# Patient Record
Sex: FEMALE | Race: WHITE | NOT HISPANIC OR LATINO
[De-identification: names, ages, dates, MRNs, and addresses within clinical notes are randomized per-mention and may not be internally consistent; named-entity substitution may affect disease eponyms.]

---

## 2017-11-17 ENCOUNTER — TRANSCRIPTION ENCOUNTER (OUTPATIENT)
Age: 37
End: 2017-11-17

## 2018-09-25 ENCOUNTER — APPOINTMENT (OUTPATIENT)
Dept: CARDIOLOGY | Facility: CLINIC | Age: 38
End: 2018-09-25

## 2018-10-11 ENCOUNTER — APPOINTMENT (OUTPATIENT)
Dept: OBGYN | Facility: CLINIC | Age: 38
End: 2018-10-11
Payer: COMMERCIAL

## 2018-10-11 VITALS
DIASTOLIC BLOOD PRESSURE: 70 MMHG | HEIGHT: 62 IN | WEIGHT: 218 LBS | SYSTOLIC BLOOD PRESSURE: 115 MMHG | BODY MASS INDEX: 40.12 KG/M2

## 2018-10-11 DIAGNOSIS — Z86.73 PERSONAL HISTORY OF TRANSIENT ISCHEMIC ATTACK (TIA), AND CEREBRAL INFARCTION W/OUT RESIDUAL DEFICITS: ICD-10-CM

## 2018-10-11 PROCEDURE — 99385 PREV VISIT NEW AGE 18-39: CPT

## 2018-10-11 RX ORDER — AZITHROMYCIN 250 MG/1
250 TABLET, FILM COATED ORAL
Qty: 6 | Refills: 0 | Status: COMPLETED | COMMUNITY
Start: 2018-06-30

## 2018-10-11 RX ORDER — CHOLECALCIFEROL (VITAMIN D3) 1250 MCG
1.25 MG CAPSULE ORAL
Qty: 12 | Refills: 0 | Status: COMPLETED | COMMUNITY
Start: 2017-09-30

## 2018-10-12 ENCOUNTER — TRANSCRIPTION ENCOUNTER (OUTPATIENT)
Age: 38
End: 2018-10-12

## 2018-10-13 LAB — HPV HIGH+LOW RISK DNA PNL CVX: NOT DETECTED

## 2018-10-17 LAB — CYTOLOGY CVX/VAG DOC THIN PREP: NORMAL

## 2018-11-12 ENCOUNTER — TRANSCRIPTION ENCOUNTER (OUTPATIENT)
Age: 38
End: 2018-11-12

## 2019-01-04 ENCOUNTER — TRANSCRIPTION ENCOUNTER (OUTPATIENT)
Age: 39
End: 2019-01-04

## 2019-01-08 ENCOUNTER — APPOINTMENT (OUTPATIENT)
Dept: NEUROLOGY | Facility: CLINIC | Age: 39
End: 2019-01-08
Payer: COMMERCIAL

## 2019-01-08 VITALS
HEIGHT: 63 IN | BODY MASS INDEX: 38.45 KG/M2 | DIASTOLIC BLOOD PRESSURE: 90 MMHG | WEIGHT: 217 LBS | SYSTOLIC BLOOD PRESSURE: 120 MMHG

## 2019-01-08 PROCEDURE — 99204 OFFICE O/P NEW MOD 45 MIN: CPT

## 2019-01-08 NOTE — ADDENDUM
[FreeTextEntry1] : This is a 38-year-old woman with what sounds like vasovagal syncope. However given her history of stroke I would like to rule out seizure as sometimes this can occur after strokes. Will check an EEG and if normal will call her with the results of severe back in the office as needed. She follows at [place] [facility] with an interventional neuroradiologist who performed her cerebral angioplasty and monitors her degree of stenosis.

## 2019-01-08 NOTE — HISTORY OF PRESENT ILLNESS
[FreeTextEntry1] : Initial office visit January 8, 2019:\par This is a 38-year-old woman who presents today for evaluation of syncope. She states about a little over week ago she woke up at about 6 AM and went to the bathroom. After getting up from the toilet and washing her hands any dizziness. Her whole body started to tingle and she fell down. She struck her head. She does not think she had any convulsive activity. She just gone to the bathroom to the wrist no urinary incontinence. He was little postictal confusion at all. She does have a complicated history with 2 strokes. Following her second stroke she has 31 angioplasty she states to correct a 90% stenosis in one of the intradural arteries. She had left-sided symptoms of whole-body face and arm greater than leg numbness leading me to believe that this is likely a right MCA lesion. She is here today for neurologic evaluation.

## 2019-01-08 NOTE — CONSULT LETTER
[Dear  ___] : Dear  [unfilled], [Consult Letter:] : I had the pleasure of evaluating your patient, [unfilled]. [Please see my note below.] : Please see my note below. [Consult Closing:] : Thank you very much for allowing me to participate in the care of this patient.  If you have any questions, please do not hesitate to contact me. [Sincerely,] : Sincerely, [FreeTextEntry3] : Marcelo Condon M.D., Ph.D. DPN-N\par [facility]\par Neurology at [place]\par Medical Director of Stroke Services\par [facility]\par

## 2019-01-08 NOTE — PHYSICAL EXAM
[General Appearance - Alert] : alert [General Appearance - In No Acute Distress] : in no acute distress [General Appearance - Well Nourished] : well nourished [Person] : oriented to person [Place] : oriented to place [Time] : oriented to time [Short Term Intact] : short term memory intact [Remote Intact] : remote memory intact [Registration Intact] : recent registration memory intact [Span Intact] : the attention span was normal [Concentration Intact] : normal concentrating ability [Visual Intact] : visual attention was ~T not ~L decreased [Naming Objects] : no difficulty naming common objects [Repeating Phrases] : no difficulty repeating a phrase [Fluency] : fluency intact [Comprehension] : comprehension intact [Current Events] : adequate knowledge of current events [Past History] : adequate knowledge of personal past history [Cranial Nerves Optic (II)] : visual acuity intact bilaterally,  visual fields full to confrontation, pupils equal round and reactive to light [Cranial Nerves Oculomotor (III)] : extraocular motion intact [Cranial Nerves Trigeminal (V)] : facial sensation intact symmetrically [Cranial Nerves Facial (VII)] : face symmetrical [Cranial Nerves Vestibulocochlear (VIII)] : hearing was intact bilaterally [Cranial Nerves Glossopharyngeal (IX)] : tongue and palate midline [Cranial Nerves Accessory (XI - Cranial And Spinal)] : head turning and shoulder shrug symmetric [Cranial Nerves Hypoglossal (XII)] : there was no tongue deviation with protrusion [Motor Strength] : muscle strength was normal in all four extremities [No Muscle Atrophy] : normal bulk in all four extremities [Motor Handedness Right-Handed] : the patient is right hand dominant [Paresis Pronator Drift Right-Sided] : no pronator drift on the right [Paresis Pronator Drift Left-Sided] : no pronator drift on the left [Sensation Tactile Decrease] : light touch was intact [Sensation Pain / Temperature Decrease] : pain and temperature was intact [Sensation Vibration Decrease] : vibration was intact [Proprioception] : proprioception was intact [Balance] : balance was intact [Tremor] : no tremor present [Coordination - Dysmetria Impaired Finger-to-Nose Bilateral] : not present [2+] : Patella left 2+ [Sclera] : the sclera and conjunctiva were normal [PERRL With Normal Accommodation] : pupils were equal in size, round, reactive to light, with normal accommodation [Extraocular Movements] : extraocular movements were intact [Optic Disc Abnormality] : the optic disc were normal in size and color [No APD] : no afferent pupillary defect [No CHARLI] : no internuclear ophthalmoplegia [Full Visual Field] : full visual field [Edema] : there was no peripheral edema [Abnormal Walk] : normal gait [Involuntary Movements] : no involuntary movements were seen [Motor Tone] : muscle strength and tone were normal

## 2019-01-08 NOTE — DATA REVIEWED
[de-identified] : She had a head CT done at [facility] emergency room after she fell which did not show any acute intracranial pathology

## 2019-01-11 ENCOUNTER — APPOINTMENT (OUTPATIENT)
Dept: NEUROLOGY | Facility: CLINIC | Age: 39
End: 2019-01-11
Payer: COMMERCIAL

## 2019-01-11 PROCEDURE — 95819 EEG AWAKE AND ASLEEP: CPT

## 2019-01-11 PROCEDURE — 93040 RHYTHM ECG WITH REPORT: CPT

## 2019-04-03 ENCOUNTER — TRANSCRIPTION ENCOUNTER (OUTPATIENT)
Age: 39
End: 2019-04-03

## 2019-05-02 ENCOUNTER — APPOINTMENT (OUTPATIENT)
Dept: OBGYN | Facility: CLINIC | Age: 39
End: 2019-05-02
Payer: COMMERCIAL

## 2019-05-02 VITALS
WEIGHT: 238 LBS | HEIGHT: 63 IN | BODY MASS INDEX: 42.17 KG/M2 | DIASTOLIC BLOOD PRESSURE: 90 MMHG | SYSTOLIC BLOOD PRESSURE: 146 MMHG

## 2019-05-02 PROCEDURE — 81025 URINE PREGNANCY TEST: CPT

## 2019-05-02 PROCEDURE — 58300 INSERT INTRAUTERINE DEVICE: CPT

## 2019-05-02 NOTE — ASSESSMENT
[FreeTextEntry1] : Nothing in the vagina for 3 days.  Motrin 600mg every 6 hours as needed for pain.  Call parameters reviewed.  RTO in 6 weeks for sono to check IUD placement.

## 2019-05-02 NOTE — PROCEDURE
[IUD Placement] : intrauterine device (IUD) placement [Prevention of Pregnancy] : prevention of pregnancy [Risks] : risks [Benefits] : benefits [Alternatives] : alternatives [Infection] : infection [Patient] : patient [Bleeding] : bleeding [Pain] : pain [Expulsion] : expulsion [Neg Pregnancy Test] : a pregnancy test was negative [Failure] : failure [No Premedication] : No premedication [Tenaculum] : a single toothed tenaculum [Easy Passage] : allowed easy passage of a uterine sound without dilation [ParaGard IUD] : The ParaGard IUD was inserted to the fundus of the uterus.  The IUD strings were cut to an appropriate length. [Tolerated Well] : the patient tolerated the procedure well [None] : no post-procedure medications given [No Complications] : there were no complications

## 2019-06-12 ENCOUNTER — APPOINTMENT (OUTPATIENT)
Dept: OBGYN | Facility: CLINIC | Age: 39
End: 2019-06-12
Payer: COMMERCIAL

## 2019-06-12 ENCOUNTER — ASOB RESULT (OUTPATIENT)
Age: 39
End: 2019-06-12

## 2019-06-12 VITALS
HEIGHT: 63 IN | SYSTOLIC BLOOD PRESSURE: 130 MMHG | WEIGHT: 238 LBS | BODY MASS INDEX: 42.17 KG/M2 | DIASTOLIC BLOOD PRESSURE: 80 MMHG

## 2019-06-12 PROCEDURE — 76857 US EXAM PELVIC LIMITED: CPT | Mod: 59

## 2019-06-12 PROCEDURE — 99213 OFFICE O/P EST LOW 20 MIN: CPT

## 2019-06-12 PROCEDURE — 76830 TRANSVAGINAL US NON-OB: CPT

## 2019-06-17 ENCOUNTER — APPOINTMENT (OUTPATIENT)
Dept: OBGYN | Facility: CLINIC | Age: 39
End: 2019-06-17

## 2019-06-18 ENCOUNTER — TRANSCRIPTION ENCOUNTER (OUTPATIENT)
Age: 39
End: 2019-06-18

## 2019-06-25 ENCOUNTER — APPOINTMENT (OUTPATIENT)
Dept: OBGYN | Facility: CLINIC | Age: 39
End: 2019-06-25
Payer: COMMERCIAL

## 2019-06-25 VITALS
HEIGHT: 63 IN | SYSTOLIC BLOOD PRESSURE: 125 MMHG | BODY MASS INDEX: 42.17 KG/M2 | WEIGHT: 238 LBS | DIASTOLIC BLOOD PRESSURE: 80 MMHG

## 2019-06-25 DIAGNOSIS — Z30.430 ENCOUNTER FOR INSERTION OF INTRAUTERINE CONTRACEPTIVE DEVICE: ICD-10-CM

## 2019-06-25 PROCEDURE — 58301 REMOVE INTRAUTERINE DEVICE: CPT

## 2019-06-25 PROCEDURE — 58300 INSERT INTRAUTERINE DEVICE: CPT

## 2019-06-25 PROCEDURE — 81025 URINE PREGNANCY TEST: CPT

## 2019-06-25 NOTE — PROCEDURE
[IUD Placement] : intrauterine device (IUD) placement [Prevention of Pregnancy] : prevention of pregnancy [Infection] : infection [Bleeding] : bleeding [Pain] : pain [Expulsion] : expulsion [Failure] : failure [Uterine Perforation] : uterine perforation [Neg Pregnancy Test] : a pregnancy test was negative [Ibuprofen ___ mg] : ibuprofen [unfilled] ~Umg [Betadine] : Prepped with Betadine [None] : none [Tenaculum] : a single toothed tenaculum [Easy Passage] : allowed easy passage of a uterine sound without dilation [ParaGard IUD] : The ParaGard IUD was inserted to the fundus of the uterus.  The IUD strings were cut to an appropriate length. [Motrin/Ibuprofen] : Motrin/Ibuprofen [IUD Removal] : IUD [Paraguard] : Micaela [Risks] : risks [Patient] : patient [Alternatives] : alternatives [Benefits] : benefits [Speculum Placed] : a speculum was placed in the vagina [Strings Visualized] : the IUD strings were visualized [IUD Removed - Forceps] : the strings were grasped with forceps and the IUD was removed [IUD Discarded] : discarded [Tolerated Well] : the patient tolerated the procedure well [No Complications] : none [de-identified] : Cytotec [de-identified] : Malposition

## 2019-06-25 NOTE — ASSESSMENT
[FreeTextEntry1] : Nothing in the vagina for 3 days.  Call parameters reviewed.  RTO in 4 weeks for sono to confirm placement.

## 2019-06-26 LAB — HCG UR QL: NEGATIVE

## 2019-07-22 ENCOUNTER — ASOB RESULT (OUTPATIENT)
Age: 39
End: 2019-07-22

## 2019-07-22 ENCOUNTER — APPOINTMENT (OUTPATIENT)
Dept: OBGYN | Facility: CLINIC | Age: 39
End: 2019-07-22
Payer: COMMERCIAL

## 2019-07-22 VITALS
HEIGHT: 63 IN | WEIGHT: 249 LBS | DIASTOLIC BLOOD PRESSURE: 84 MMHG | HEART RATE: 80 BPM | BODY MASS INDEX: 44.12 KG/M2 | SYSTOLIC BLOOD PRESSURE: 134 MMHG

## 2019-07-22 PROCEDURE — 76857 US EXAM PELVIC LIMITED: CPT | Mod: 59

## 2019-07-22 PROCEDURE — 76830 TRANSVAGINAL US NON-OB: CPT

## 2019-07-22 PROCEDURE — 99213 OFFICE O/P EST LOW 20 MIN: CPT | Mod: 25

## 2019-10-08 ENCOUNTER — APPOINTMENT (OUTPATIENT)
Dept: NEUROLOGY | Facility: CLINIC | Age: 39
End: 2019-10-08

## 2019-11-03 ENCOUNTER — TRANSCRIPTION ENCOUNTER (OUTPATIENT)
Age: 39
End: 2019-11-03

## 2020-02-21 ENCOUNTER — APPOINTMENT (OUTPATIENT)
Dept: DERMATOLOGY | Facility: CLINIC | Age: 40
End: 2020-02-21
Payer: COMMERCIAL

## 2020-02-21 PROCEDURE — 11200 RMVL SKIN TAGS UP TO&INC 15: CPT

## 2020-02-21 PROCEDURE — 99202 OFFICE O/P NEW SF 15 MIN: CPT | Mod: 25

## 2020-02-27 ENCOUNTER — APPOINTMENT (OUTPATIENT)
Dept: BARIATRICS | Facility: CLINIC | Age: 40
End: 2020-02-27
Payer: COMMERCIAL

## 2020-02-27 VITALS
WEIGHT: 253 LBS | HEART RATE: 73 BPM | HEIGHT: 63 IN | DIASTOLIC BLOOD PRESSURE: 84 MMHG | OXYGEN SATURATION: 99 % | SYSTOLIC BLOOD PRESSURE: 122 MMHG | BODY MASS INDEX: 44.83 KG/M2

## 2020-02-27 DIAGNOSIS — Z87.19 PERSONAL HISTORY OF OTHER DISEASES OF THE DIGESTIVE SYSTEM: ICD-10-CM

## 2020-02-27 PROCEDURE — 99215 OFFICE O/P EST HI 40 MIN: CPT

## 2020-02-27 RX ORDER — MISOPROSTOL 200 UG/1
200 TABLET ORAL
Qty: 3 | Refills: 0 | Status: DISCONTINUED | COMMUNITY
Start: 2019-06-17 | End: 2020-02-27

## 2020-02-27 RX ORDER — MISOPROSTOL 200 UG/1
200 TABLET ORAL
Qty: 3 | Refills: 0 | Status: DISCONTINUED | COMMUNITY
Start: 2019-06-12 | End: 2020-02-27

## 2020-03-05 ENCOUNTER — APPOINTMENT (OUTPATIENT)
Dept: BARIATRICS/WEIGHT MGMT | Facility: CLINIC | Age: 40
End: 2020-03-05
Payer: COMMERCIAL

## 2020-03-05 VITALS — BODY MASS INDEX: 45.31 KG/M2 | WEIGHT: 255.74 LBS | HEIGHT: 63 IN

## 2020-03-05 PROCEDURE — 97802 MEDICAL NUTRITION INDIV IN: CPT

## 2020-03-13 ENCOUNTER — APPOINTMENT (OUTPATIENT)
Dept: NEUROLOGY | Facility: CLINIC | Age: 40
End: 2020-03-13
Payer: COMMERCIAL

## 2020-03-13 VITALS
WEIGHT: 255 LBS | HEIGHT: 63 IN | SYSTOLIC BLOOD PRESSURE: 136 MMHG | DIASTOLIC BLOOD PRESSURE: 88 MMHG | BODY MASS INDEX: 45.18 KG/M2

## 2020-03-13 PROCEDURE — 99214 OFFICE O/P EST MOD 30 MIN: CPT

## 2020-03-13 NOTE — ASSESSMENT
[FreeTextEntry1] : This is a 39-year-old woman with recurrent migraine headache for 7 days. His complicated migraine. She is concerned that she had similar symptoms prior to her stroke several years ago. She had a recent angiogram which showed normal vessels she states. I will check an MRI of her brain. All of her steroid dose pack to see if I can break her cycle migraine. I will see her back in 2 months for call her once again the results of the MRI to discuss any further treatment as needed. She normally takes Aleve for her headaches. I told her on the Medrol Dosepak not to take Aleve but use Tylenol and then she did restart the Aleve when she finishes the steroids.

## 2020-03-13 NOTE — HISTORY OF PRESENT ILLNESS
[FreeTextEntry1] : Initial office visit January 8, 2019:\par This is a 38-year-old woman who presents today for evaluation of syncope. She states about a little over week ago she woke up at about 6 AM and went to the bathroom. After getting up from the toilet and washing her hands any dizziness. Her whole body started to tingle and she fell down. She struck her head. She does not think she had any convulsive activity. She just gone to the bathroom to the wrist no urinary incontinence. He was little postictal confusion at all. She does have a complicated history with 2 strokes. Following her second stroke she has 31 angioplasty she states to correct a 90% stenosis in one of the intradural arteries. She had left-sided symptoms of whole-body face and arm greater than leg numbness leading me to believe that this is likely a right MCA lesion. She is here today for neurologic evaluation.\par \par Followup March 13, 2020:\par This is a 39-year-old woman who presents today for neurologic followup. She is having a new problem of migraine headache. She has had stroke when she was younger status post angioplasty and follows with the neuroradiologist at [place] and had recent angiogram which he states was normal. The last week or so she's been having migraine headache with mostly bilateral posterior head pain with dry heaving as well as numbness into both arms and photophobia as well as a dizziness. The dizziness is exacerbated she looks up or down. She is concerned because she had migraines at this prior to her previous stroke. She is here today for neurologic reevaluation.

## 2020-03-13 NOTE — CONSULT LETTER
[Dear  ___] : Dear  [unfilled], [Consult Letter:] : I had the pleasure of evaluating your patient, [unfilled]. [Please see my note below.] : Please see my note below. [Consult Closing:] : Thank you very much for allowing me to participate in the care of this patient.  If you have any questions, please do not hesitate to contact me. [Sincerely,] : Sincerely, [FreeTextEntry3] : Marcelo Condon M.D., Ph.D. DPN-N\par [facility]\par Neurology at [place]\par Medical Director of Stroke Services\par [facility]\par

## 2020-03-13 NOTE — PHYSICAL EXAM

## 2020-03-13 NOTE — REVIEW OF SYSTEMS
[As Noted in HPI] : as noted in HPI [Numbness] : numbness [Vertigo] : vertigo [Migraine Headache] : migraine headaches [Negative] : Heme/Lymph

## 2020-03-14 ENCOUNTER — FORM ENCOUNTER (OUTPATIENT)
Age: 40
End: 2020-03-14

## 2020-03-15 ENCOUNTER — APPOINTMENT (OUTPATIENT)
Dept: MRI IMAGING | Facility: CLINIC | Age: 40
End: 2020-03-15
Payer: COMMERCIAL

## 2020-03-15 ENCOUNTER — OUTPATIENT (OUTPATIENT)
Dept: OUTPATIENT SERVICES | Facility: HOSPITAL | Age: 40
LOS: 1 days | End: 2020-03-15
Payer: COMMERCIAL

## 2020-03-15 DIAGNOSIS — G43.109 MIGRAINE WITH AURA, NOT INTRACTABLE, WITHOUT STATUS MIGRAINOSUS: ICD-10-CM

## 2020-03-15 PROCEDURE — 70551 MRI BRAIN STEM W/O DYE: CPT | Mod: 26

## 2020-03-15 PROCEDURE — 70551 MRI BRAIN STEM W/O DYE: CPT

## 2020-04-10 ENCOUNTER — RX CHANGE (OUTPATIENT)
Age: 40
End: 2020-04-10

## 2020-04-10 RX ORDER — TOPIRAMATE 50 MG/1
50 TABLET, FILM COATED ORAL
Qty: 60 | Refills: 5 | Status: DISCONTINUED | COMMUNITY
Start: 2020-03-18 | End: 2020-04-10

## 2020-04-15 ENCOUNTER — APPOINTMENT (OUTPATIENT)
Dept: BARIATRICS/WEIGHT MGMT | Facility: CLINIC | Age: 40
End: 2020-04-15
Payer: COMMERCIAL

## 2020-04-15 PROCEDURE — 97803 MED NUTRITION INDIV SUBSEQ: CPT | Mod: 95

## 2020-04-21 ENCOUNTER — APPOINTMENT (OUTPATIENT)
Dept: BARIATRICS/WEIGHT MGMT | Facility: CLINIC | Age: 40
End: 2020-04-21

## 2020-04-21 ENCOUNTER — APPOINTMENT (OUTPATIENT)
Dept: BARIATRICS | Facility: CLINIC | Age: 40
End: 2020-04-21
Payer: COMMERCIAL

## 2020-04-21 VITALS — WEIGHT: 243 LBS | HEIGHT: 63 IN | BODY MASS INDEX: 43.05 KG/M2

## 2020-04-21 DIAGNOSIS — E07.89 OTHER SPECIFIED DISORDERS OF THYROID: ICD-10-CM

## 2020-04-21 DIAGNOSIS — R63.5 ABNORMAL WEIGHT GAIN: ICD-10-CM

## 2020-04-21 PROCEDURE — 99214 OFFICE O/P EST MOD 30 MIN: CPT | Mod: 95

## 2020-04-21 RX ORDER — METHYLPREDNISOLONE 4 MG/1
4 TABLET ORAL
Qty: 1 | Refills: 0 | Status: DISCONTINUED | COMMUNITY
Start: 2020-03-13 | End: 2020-04-21

## 2020-04-21 NOTE — REASON FOR VISIT
[Initial Consult] : an initial consult for [S/P Bariatric Surgery] : s/p bariatric surgery [Morbid Obesity (BMI>40)] : morbid obesity (bmi>40)

## 2020-04-23 NOTE — HISTORY OF PRESENT ILLNESS
[Home] : at home, [unfilled] , at the time of the visit. [Patient] : the patient [Other Location: e.g. Home (Enter Location, City,State)___] : at [unfilled] [Procedure: ___] : Procedure performed: [unfilled]  [Self] : self [Surgeon Name:   ___] : Surgeon Name: Dr. MARIN [Date of Surgery: ___] : Date of Surgery:   [unfilled] [Pre-Op Weight ___] : Pre-op weight was [unfilled] lbs [de-identified] : 39 year old female s/p laparoscopic sleeve gastrectomy at [place] presents for follow up visit. She lost 10 lbs since last visit, which she attributes to changes in lifestyle and eating habits. Based on brief diet recall, she is consuming the appropriate amount of protein and water daily and not snacking. She is drinking less with meals and decreased soda intake by half. Patient reports that she temporarily stopped school and will return in July. Also, since restrictions from COVID 19 pandemic, she is working from home and has more time to focus on her. She is now sleeping about 7 hours a night. She is taking vitamins (womens one a day multivitamin) daily. Patient was placed on Topiramate for treatment of migraines last month. She denies acid reflux symptoms, nausea, vomiting, diarrhea and constipation. For exercise, she is walking less, but takes 30 min walks daily. Patient did not get labs drawn as requested at last visit.

## 2020-04-23 NOTE — REVIEW OF SYSTEMS
[Patient Intake Form Reviewed] : Patient intake form was reviewed [Recent Change In Weight] : ~T recent weight change [Negative] : Integumentary [Fever] : no fever [Fatigue] : no fatigue [Night Sweats] : no night sweats [Chills] : no chills [Confused] : no confusion [Fainting] : no fainting [Difficulty Walking] : no difficulty walking [Dizziness] : no dizziness [FreeTextEntry2] : weight loss

## 2020-04-23 NOTE — PHYSICAL EXAM
[Obese, well nourished, in no acute distress] : obese, well nourished, in no acute distress [Normal] : affect appropriate [de-identified] : corrective lenses, EOMI, anicteric

## 2020-04-23 NOTE — ASSESSMENT
[FreeTextEntry1] : 39 year old female s/p laparoscopic sleeve gastrectomy at [place] presents for follow up visit. She is doing well and losing weight. The patient was encouraged to continue a low fat, low carbohydrate and high protein diet.  She was advised to continue to limit drinking with meals and to stop drinking soda. She has follow up appointment with nutritionist next month. Patient was advised to increase amount of walking to reach goal of 10,000 steps daily and to incorporate strength exercises. It was also suggested that she come up with plan to manage life stressors when there are less restrictions from COVID 19 pandemic. \par \par Nutrition and exercise counseling provided.\par Continue vitamins - may switch after labs\par LABS\par Follow up in 2 months \par Call with any questions or concerns\par

## 2020-04-23 NOTE — ASSESSMENT
[FreeTextEntry1] : 39 year old female that had laparoscopic sleeve gastrectomy with hiatal hernia repair with Dr Robertson at [place] in Jan 2018 presents today with weight regain and to discuss options for revisional bariatric surgery. Upon review of patient diet history, she has improvements to make, should continue a low fat, low carbohydrate and protein focused diet, but no liquid calories and snacking.  Patient was encouraged to food journal and make nutritionist appointment. Management of life stressors in relation to eating habits were discussed with her and it was suggested that she make appointment with psychologist. For exercise, she was directed to continue walking 10 K steps daily and to add strength exercises. \par \par Will refer her to obesity medicine for evaluation prior to pursuing options for revisional bariatric surgery.\par \par Nutrition and exercise counseling provided.\par Appointments with obesity medicine physician, nutritionist and psychologist\par LABS\par Follow up in 2 months\par Call with any questions or concerns

## 2020-04-23 NOTE — REVIEW OF SYSTEMS
[Patient Intake Form Reviewed] : Patient intake form was reviewed [Recent Change In Weight] : ~T recent weight change [Negative] : Psychiatric [Fever] : no fever [Night Sweats] : no night sweats [Fatigue] : no fatigue [Chills] : no chills [Confused] : no confusion [Fainting] : no fainting [Dizziness] : no dizziness [Difficulty Walking] : no difficulty walking [FreeTextEntry2] : weight gain [de-identified] : headaches

## 2020-04-23 NOTE — PHYSICAL EXAM
[Obese, well nourished, in no acute distress] : obese, well nourished, in no acute distress [Normal] : affect appropriate [de-identified] : EOMI, anicteric  [de-identified] : obese, soft, nontender, no evidence of hernia. well healed incisions

## 2020-04-23 NOTE — HISTORY OF PRESENT ILLNESS
[Procedure: ___] : Procedure performed: [unfilled]  [Date of Surgery: ___] : Date of Surgery:   [unfilled] [Surgeon Name:   ___] : Surgeon Name: Dr. MARIN [Pre-Op Weight ___] : Pre-op weight was [unfilled] lbs [de-identified] : 39 year old female that had laparoscopic sleeve gastrectomy with hiatal hernia repair with Dr Robertson at [place] in Jan 2018 presents today with weight regain and to discuss options for revisional bariatric surgery. She reports that her preop weight was 315 lb and lowest weight was 215 lb within first year of surgery. She has not followed up with surgeon since about 9 months after surgery. Patient attributes weight regain to life stressors including mother getting sick, working and going to school full time and minimal sleep. She is having three protein rich meals a day and feels that portion sizes are the same as prior to surgery. She drinks with meals and has regular soda once a week. When she stays up late at night to do school work, she drinks tea to stay up and then snacks. Patient states she is an "emotional eater". She denies acid reflux symptoms, nausea, vomiting, diarrhea and constipation.  For exercise, she walks 10 K steps daily.

## 2020-04-26 ENCOUNTER — MESSAGE (OUTPATIENT)
Age: 40
End: 2020-04-26

## 2020-05-02 ENCOUNTER — APPOINTMENT (OUTPATIENT)
Dept: DISASTER EMERGENCY | Facility: CLINIC | Age: 40
End: 2020-05-02

## 2020-05-04 LAB
SARS-COV-2 IGG SERPL IA-ACNC: <0.1 INDEX
SARS-COV-2 IGG SERPL QL IA: NEGATIVE

## 2020-05-05 ENCOUNTER — APPOINTMENT (OUTPATIENT)
Dept: NEUROLOGY | Facility: CLINIC | Age: 40
End: 2020-05-05
Payer: COMMERCIAL

## 2020-05-05 PROCEDURE — 99213 OFFICE O/P EST LOW 20 MIN: CPT | Mod: 95

## 2020-05-05 RX ORDER — TOPIRAMATE 50 MG/1
50 TABLET, FILM COATED ORAL
Qty: 180 | Refills: 2 | Status: COMPLETED | COMMUNITY
Start: 2020-04-10 | End: 2020-05-05

## 2020-05-05 NOTE — HISTORY OF PRESENT ILLNESS
[Home] : at home, [unfilled] , at the time of the visit. [Medical Office: ([facility])___] : at the medical office located in  [Patient] : the patient [FreeTextEntry1] : Initial office visit January 8, 2019:\par This is a 38-year-old woman who presents today for evaluation of syncope. She states about a little over week ago she woke up at about 6 AM and went to the bathroom. After getting up from the toilet and washing her hands any dizziness. Her whole body started to tingle and she fell down. She struck her head. She does not think she had any convulsive activity. She just gone to the bathroom to the wrist no urinary incontinence. He was little postictal confusion at all. She does have a complicated history with 2 strokes. Following her second stroke she has 31 angioplasty she states to correct a 90% stenosis in one of the intradural arteries. She had left-sided symptoms of whole-body face and arm greater than leg numbness leading me to believe that this is likely a right MCA lesion. She is here today for neurologic evaluation.\par \par Followup March 13, 2020:\par This is a 39-year-old woman who presents today for neurologic followup. She is having a new problem of migraine headache. She has had stroke when she was younger status post angioplasty and follows with the neuroradiologist at [place] and had recent angiogram which he states was normal. The last week or so she's been having migraine headache with mostly bilateral posterior head pain with dry heaving as well as numbness into both arms and photophobia as well as a dizziness. The dizziness is exacerbated she looks up or down. She is concerned because she had migraines at this prior to her previous stroke. She is here today for neurologic reevaluation.\par \par Followup May 5, 2020:\par This is a 39-year-old woman who presents today for followup of migraine. This is done via video conference due to the corona virus outbreak. Verbal consent is obtained at the time of the visit. She states that her migraines her little but better but she still having them 3-4 times per week. She is maintained on Topamax 50 mg twice a day. For her breakthrough headache she takes Aleve which usually helps. In the past she has used Botox which is helped. She had not needed Botox again until recently. She is here today with a video for followup.

## 2020-05-05 NOTE — PHYSICAL EXAM
[FreeTextEntry1] : Neurologic examination was limited due to the video call.\par \par Mental status: Awake and alert fully oriented to person place and time. There is no aphasia.\par \par Cranial nerves:  Full extraocular movements. There is no nystagmus. Normal facial sensation and motor function. Tongue was in the midline.\par \par Motor: no pronator drift  bilaterally.\par \par Sensation: Light touch was intact \par \par Coordination: Deferred\par \par Gait: Appeared normal\par

## 2020-05-05 NOTE — CONSULT LETTER
[Dear  ___] : Dear  [unfilled], [Courtesy Letter:] : I had the pleasure of seeing your patient, [unfilled], in my office today. [Please see my note below.] : Please see my note below. [Consult Closing:] : Thank you very much for allowing me to participate in the care of this patient.  If you have any questions, please do not hesitate to contact me. [Sincerely,] : Sincerely, [FreeTextEntry3] : Marcelo Condon M.D., Ph.D. DPN-N\par [facility]\par Neurology at [place]\par Medical Director of Stroke Services\par [facility]\par

## 2020-05-05 NOTE — ASSESSMENT
[FreeTextEntry1] : This is a 39-year-old woman with migraine headache. She still getting 3-4 headaches per week. I will increase her Topamax 100 mg twice a day. If this does not help she may need to be reevaluated for Botox. If needed I will refer her to my colleagues to inject Botox. I will see her back in the office in 3 months for followup sooner should the need arise. I've asked her to call me in the interim with any problems, questions or concerns.

## 2020-05-14 ENCOUNTER — RX CHANGE (OUTPATIENT)
Age: 40
End: 2020-05-14

## 2020-05-14 RX ORDER — TOPIRAMATE 100 MG/1
100 TABLET, FILM COATED ORAL
Qty: 60 | Refills: 5 | Status: DISCONTINUED | COMMUNITY
Start: 2020-05-05 | End: 2020-05-14

## 2020-05-27 ENCOUNTER — APPOINTMENT (OUTPATIENT)
Dept: BARIATRICS/WEIGHT MGMT | Facility: CLINIC | Age: 40
End: 2020-05-27

## 2020-06-20 ENCOUNTER — TRANSCRIPTION ENCOUNTER (OUTPATIENT)
Age: 40
End: 2020-06-20

## 2020-06-22 ENCOUNTER — APPOINTMENT (OUTPATIENT)
Dept: BARIATRICS | Facility: CLINIC | Age: 40
End: 2020-06-22
Payer: COMMERCIAL

## 2020-06-22 VITALS — HEIGHT: 63 IN | BODY MASS INDEX: 43.05 KG/M2 | WEIGHT: 243 LBS

## 2020-06-22 DIAGNOSIS — Z98.84 BARIATRIC SURGERY STATUS: ICD-10-CM

## 2020-06-22 DIAGNOSIS — E66.01 MORBID (SEVERE) OBESITY DUE TO EXCESS CALORIES: ICD-10-CM

## 2020-06-22 DIAGNOSIS — Z87.898 PERSONAL HISTORY OF OTHER SPECIFIED CONDITIONS: ICD-10-CM

## 2020-06-22 DIAGNOSIS — R79.0 ABNORMAL LVL OF BLOOD MINERAL: ICD-10-CM

## 2020-06-22 LAB
25(OH)D3 SERPL-MCNC: 26.7 NG/ML
ALBUMIN SERPL ELPH-MCNC: 4.3 G/DL
ALP BLD-CCNC: 101 U/L
ALT SERPL-CCNC: 11 U/L
ANION GAP SERPL CALC-SCNC: 13 MMOL/L
AST SERPL-CCNC: 14 U/L
BASOPHILS # BLD AUTO: 0.05 K/UL
BASOPHILS NFR BLD AUTO: 0.9 %
BILIRUB SERPL-MCNC: 0.2 MG/DL
BUN SERPL-MCNC: 10 MG/DL
CALCIUM SERPL-MCNC: 9 MG/DL
CHLORIDE SERPL-SCNC: 101 MMOL/L
CHOLEST SERPL-MCNC: 225 MG/DL
CHOLEST/HDLC SERPL: 3 RATIO
CO2 SERPL-SCNC: 23 MMOL/L
CREAT SERPL-MCNC: 0.66 MG/DL
EOSINOPHIL # BLD AUTO: 0.15 K/UL
EOSINOPHIL NFR BLD AUTO: 2.8 %
ESTIMATED AVERAGE GLUCOSE: 131 MG/DL
FERRITIN SERPL-MCNC: 8 NG/ML
FOLATE SERPL-MCNC: 6.6 NG/ML
GLUCOSE SERPL-MCNC: 109 MG/DL
HBA1C MFR BLD HPLC: 6.2 %
HCT VFR BLD CALC: 37.4 %
HDLC SERPL-MCNC: 74 MG/DL
HGB BLD-MCNC: 11.2 G/DL
IMM GRANULOCYTES NFR BLD AUTO: 0.2 %
IRON SATN MFR SERPL: 10 %
IRON SERPL-MCNC: 44 UG/DL
LDLC SERPL CALC-MCNC: 126 MG/DL
LYMPHOCYTES # BLD AUTO: 1.58 K/UL
LYMPHOCYTES NFR BLD AUTO: 29.5 %
MAN DIFF?: NORMAL
MCHC RBC-ENTMCNC: 24.8 PG
MCHC RBC-ENTMCNC: 29.9 GM/DL
MCV RBC AUTO: 82.9 FL
MONOCYTES # BLD AUTO: 0.49 K/UL
MONOCYTES NFR BLD AUTO: 9.2 %
NEUTROPHILS # BLD AUTO: 3.07 K/UL
NEUTROPHILS NFR BLD AUTO: 57.4 %
PLATELET # BLD AUTO: 482 K/UL
POTASSIUM SERPL-SCNC: 4.5 MMOL/L
PROT SERPL-MCNC: 6.7 G/DL
RBC # BLD: 4.51 M/UL
RBC # FLD: 13.8 %
SODIUM SERPL-SCNC: 137 MMOL/L
TIBC SERPL-MCNC: 461 UG/DL
TRIGL SERPL-MCNC: 127 MG/DL
TSH SERPL-ACNC: 2.56 UIU/ML
UIBC SERPL-MCNC: 417 UG/DL
VIT B12 SERPL-MCNC: 666 PG/ML
VIT B2 SERPL-MCNC: 196 UG/L
WBC # FLD AUTO: 5.35 K/UL

## 2020-06-22 PROCEDURE — 99214 OFFICE O/P EST MOD 30 MIN: CPT | Mod: 95

## 2020-06-22 NOTE — REASON FOR VISIT
[Morbid Obesity (BMI>40)] : morbid obesity (bmi>40) [S/P Bariatric Surgery] : s/p bariatric surgery [Follow-Up Visit] : a follow-up visit for

## 2020-06-29 NOTE — HISTORY OF PRESENT ILLNESS
[Home] : at home, [unfilled] , at the time of the visit. [Verbal consent obtained from patient] : the patient, [unfilled] [Medical Office: ([facility])___] : at the medical office located in  [Procedure: ___] : Procedure performed: [unfilled]  [Date of Surgery: ___] : Date of Surgery:   [unfilled] [Pre-Op Weight ___] : Pre-op weight was [unfilled] lbs [Surgeon Name:   ___] : Surgeon Name: Dr. MARIN [de-identified] : 39 year old female s/p laparoscopic sleeve gastrectomy at [place] presents for follow up visit. She maintained weight since last visit. Patient states that she has increased stress and thus some days is snacking throughout the day and after dinner on fruits. She considers herself an "emotional eater".  The increased stress is making it difficult to fall asleep at night and in turn, she will get up late and not have breakfast before start of working from home.  Therefore, she is not consuming the appropriate amount of protein, but is having adequate water daily.  She is taking vitamins (womens one a day multivitamin) daily and started Vit D 5000 IU once a week and Vit B12 and B complex. Patient reports worsening migraines despite recent increase in dose of Topiramate. She denies acid reflux symptoms, nausea, vomiting, diarrhea and constipation. For exercise, she is riding bike for 2-3 miles daily. Patient recently had labs drawn.

## 2020-06-29 NOTE — REVIEW OF SYSTEMS
[Patient Intake Form Reviewed] : Patient intake form was reviewed [Negative] : Integumentary [Joint Pain] : joint pain [Depression] : depression [Anxiety] : anxiety [Fever] : no fever [Chills] : no chills [Night Sweats] : no night sweats [Confused] : no confusion [Fatigue] : no fatigue [Recent Change In Weight] : ~T no recent weight change [Fainting] : no fainting [Dizziness] : no dizziness [Difficulty Walking] : no difficulty walking [Insomnia] : no insomnia [Suicidal] : not suicidal [FreeTextEntry9] : injured right wrist

## 2020-06-29 NOTE — PHYSICAL EXAM
[Obese, well nourished, in no acute distress] : obese, well nourished, in no acute distress [Normal] : grossly intact [de-identified] : corrective lenses, EOMI, anicteric

## 2020-06-29 NOTE — ASSESSMENT
[FreeTextEntry1] : 39 year old female s/p laparoscopic sleeve gastrectomy at [place] presents for follow up visit. She is maintaining weight. Since patient reports that she is an emotional eater and she is struggling now with stress, she was advised to make appointment with psychologist. Also, it was suggested that she do meditation prior to bed to help with falling asleep. The patient was encouraged to have a low fat, low carbohydrate and high protein diet consisting of three meals a day and not snacking.  She cancelled follow up appointment with nutritionist. Patient was advised to increase amount of walking to reach goal of 10,000 steps daily and to incorporate strength exercises. Reviewed lab results with patient. Vitamin D was slightly low and patient was directed to switch current Vitamin D3 5000 IU weekly to Vitamin D3 1000 IU once daily. She was directed to see hematologist about high platelet count, low ferritin and low iron sat. When told about elevated LDL and total Cholesterol, she stated that PCP started her another medications for hyperlipidemia. She was informed of Hgb A1c level and reports that its about the same as previous ones. She is currently taking metformin. A few Vitamin levels are still pending. For migraines, she is planning to see specialist for Botox injections. \par \par Nutrition and exercise counseling provided.\par Continue multivitamin - switch Vitamin D3 5000 IU weekly to Vitamin D3 1000 IU once daily.\par Food journal and nutritionist appointment\par Psychologist appointment\par Refer to hematologist\par Follow up in 6-8 weeks\par Call with any questions or concerns\par

## 2020-06-30 LAB
VIT A SERPL-MCNC: 42.8 UG/DL
VIT B1 SERPL-MCNC: 111.8 NMOL/L
VIT B6 SERPL-MCNC: 2.4 UG/L

## 2020-07-28 ENCOUNTER — OUTPATIENT (OUTPATIENT)
Dept: OUTPATIENT SERVICES | Facility: HOSPITAL | Age: 40
LOS: 1 days | End: 2020-07-28

## 2020-07-28 ENCOUNTER — APPOINTMENT (OUTPATIENT)
Dept: MRI IMAGING | Facility: CLINIC | Age: 40
End: 2020-07-28
Payer: COMMERCIAL

## 2020-07-28 DIAGNOSIS — Z00.8 ENCOUNTER FOR OTHER GENERAL EXAMINATION: ICD-10-CM

## 2020-07-28 PROCEDURE — 73221 MRI JOINT UPR EXTREM W/O DYE: CPT | Mod: 26,RT

## 2020-08-07 ENCOUNTER — APPOINTMENT (OUTPATIENT)
Dept: NEUROLOGY | Facility: CLINIC | Age: 40
End: 2020-08-07
Payer: COMMERCIAL

## 2020-08-07 VITALS — HEIGHT: 63 IN | BODY MASS INDEX: 43.05 KG/M2 | WEIGHT: 243 LBS

## 2020-08-07 DIAGNOSIS — G43.109 MIGRAINE WITH AURA, NOT INTRACTABLE, W/OUT STATUS MIGRAINOSUS: ICD-10-CM

## 2020-08-07 PROCEDURE — 99213 OFFICE O/P EST LOW 20 MIN: CPT

## 2020-08-07 NOTE — HISTORY OF PRESENT ILLNESS
[FreeTextEntry1] : Initial office visit January 8, 2019:\par This is a 38-year-old woman who presents today for evaluation of syncope. She states about a little over week ago she woke up at about 6 AM and went to the bathroom. After getting up from the toilet and washing her hands any dizziness. Her whole body started to tingle and she fell down. She struck her head. She does not think she had any convulsive activity. She just gone to the bathroom to the wrist no urinary incontinence. He was little postictal confusion at all. She does have a complicated history with 2 strokes. Following her second stroke she has 31 angioplasty she states to correct a 90% stenosis in one of the intradural arteries. She had left-sided symptoms of whole-body face and arm greater than leg numbness leading me to believe that this is likely a right MCA lesion. She is here today for neurologic evaluation.\par \par Followup March 13, 2020:\par This is a 39-year-old woman who presents today for neurologic followup. She is having a new problem of migraine headache. She has had stroke when she was younger status post angioplasty and follows with the neuroradiologist at [place] and had recent angiogram which he states was normal. The last week or so she's been having migraine headache with mostly bilateral posterior head pain with dry heaving as well as numbness into both arms and photophobia as well as a dizziness. The dizziness is exacerbated she looks up or down. She is concerned because she had migraines at this prior to her previous stroke. She is here today for neurologic reevaluation.\par \par Followup May 5, 2020:\par This is a 39-year-old woman who presents today for followup of migraine. This is done via video conference due to the corona virus outbreak. Verbal consent is obtained at the time of the visit. She states that her migraines her little but better but she still having them 3-4 times per week. She is maintained on Topamax 50 mg twice a day. For her breakthrough headache she takes Aleve which usually helps. In the past she has used Botox which is helped. She had not needed Botox again until recently. She is here today with a video for followup.\par \par Followup on the seventh 2020:\par This is a 39-year-old woman who presents today for neurologic evaluation of migraine. At last visit I increased her Topamax 200 mg twice a day. With this increase she is experiencing about half the frequency of headaches, 2 per week. These are relieved with Aleve. She is overall satisfied with his course of treatment. She is here today for followup.

## 2020-08-07 NOTE — CONSULT LETTER
[Dear  ___] : Dear  [unfilled], [Courtesy Letter:] : I had the pleasure of seeing your patient, [unfilled], in my office today. [Please see my note below.] : Please see my note below. [Consult Closing:] : Thank you very much for allowing me to participate in the care of this patient.  If you have any questions, please do not hesitate to contact me. [Sincerely,] : Sincerely, [FreeTextEntry3] : Marcelo Condon M.D., Ph.D. DPN-N\par [facility]\par Neurology at [place]\par Medical Director of Stroke Services\par [facility]\par

## 2020-08-07 NOTE — PHYSICAL EXAM
[Person] : oriented to person [Time] : oriented to time [Place] : oriented to place [Remote Intact] : remote memory intact [Registration Intact] : recent registration memory intact [Concentration Intact] : normal concentrating ability [Span Intact] : the attention span was normal [Naming Objects] : no difficulty naming common objects [Visual Intact] : visual attention was ~T not ~L decreased [Repeating Phrases] : no difficulty repeating a phrase [Fluency] : fluency intact [Current Events] : adequate knowledge of current events [Comprehension] : comprehension intact [Past History] : adequate knowledge of personal past history [Cranial Nerves Optic (II)] : visual acuity intact bilaterally,  visual fields full to confrontation, pupils equal round and reactive to light [Cranial Nerves Oculomotor (III)] : extraocular motion intact [Cranial Nerves Vestibulocochlear (VIII)] : hearing was intact bilaterally [Cranial Nerves Trigeminal (V)] : facial sensation intact symmetrically [Cranial Nerves Facial (VII)] : face symmetrical [Cranial Nerves Accessory (XI - Cranial And Spinal)] : head turning and shoulder shrug symmetric [Cranial Nerves Glossopharyngeal (IX)] : tongue and palate midline [Motor Strength] : muscle strength was normal in all four extremities [Cranial Nerves Hypoglossal (XII)] : there was no tongue deviation with protrusion [Motor Tone] : muscle tone was normal in all four extremities [No Muscle Atrophy] : normal bulk in all four extremities [Involuntary Movements] : no involuntary movements were seen [Paresis Pronator Drift Right-Sided] : no pronator drift on the right [Motor Handedness Right-Handed] : the patient is right hand dominant [Paresis Pronator Drift Left-Sided] : no pronator drift on the left [Motor Strength Upper Extremities Bilaterally] : strength was normal in both upper extremities [Motor Strength Lower Extremities Bilaterally] : strength was normal in both lower extremities [Sensation Tactile Decrease] : light touch was intact [Sensation Pain / Temperature Decrease] : pain and temperature was intact [Sensation Vibration Decrease] : vibration was intact [Proprioception] : proprioception was intact [Abnormal Walk] : normal gait [Balance] : balance was intact [Tremor] : no tremor present [Coordination - Dysmetria Impaired Finger-to-Nose Bilateral] : not present [2+] : Patella left 2+ [Sclera] : the sclera and conjunctiva were normal [PERRL With Normal Accommodation] : pupils were equal in size, round, reactive to light, with normal accommodation [Extraocular Movements] : extraocular movements were intact [No APD] : no afferent pupillary defect [No CHARLI] : no internuclear ophthalmoplegia [Full Visual Field] : full visual field

## 2020-08-07 NOTE — ASSESSMENT
[FreeTextEntry1] : This is a 39-year-old woman is competent migraine-type headaches. She is having 2 headaches per week. At this point she satisfied with taking Topamax 100 mg twice a day for prevention and Aleve for breakthrough headaches. She'll continue this regimen I will see her back in 6 months, sooner should the need arise.

## 2020-10-03 ENCOUNTER — TRANSCRIPTION ENCOUNTER (OUTPATIENT)
Age: 40
End: 2020-10-03

## 2020-12-01 ENCOUNTER — TRANSCRIPTION ENCOUNTER (OUTPATIENT)
Age: 40
End: 2020-12-01

## 2021-02-16 ENCOUNTER — APPOINTMENT (OUTPATIENT)
Dept: NEUROLOGY | Facility: CLINIC | Age: 41
End: 2021-02-16

## 2021-05-12 ENCOUNTER — NON-APPOINTMENT (OUTPATIENT)
Age: 41
End: 2021-05-12

## 2021-05-12 ENCOUNTER — APPOINTMENT (OUTPATIENT)
Dept: FAMILY MEDICINE | Facility: CLINIC | Age: 41
End: 2021-05-12
Payer: COMMERCIAL

## 2021-05-12 VITALS — DIASTOLIC BLOOD PRESSURE: 84 MMHG | SYSTOLIC BLOOD PRESSURE: 128 MMHG

## 2021-05-12 VITALS
DIASTOLIC BLOOD PRESSURE: 80 MMHG | OXYGEN SATURATION: 98 % | BODY MASS INDEX: 45.18 KG/M2 | TEMPERATURE: 98 F | SYSTOLIC BLOOD PRESSURE: 116 MMHG | WEIGHT: 255 LBS | HEART RATE: 63 BPM | HEIGHT: 63 IN

## 2021-05-12 DIAGNOSIS — Z12.39 ENCOUNTER FOR OTHER SCREENING FOR MALIGNANT NEOPLASM OF BREAST: ICD-10-CM

## 2021-05-12 DIAGNOSIS — R92.2 INCONCLUSIVE MAMMOGRAM: ICD-10-CM

## 2021-05-12 PROBLEM — E07.89 PALPABLE THYROID: Status: ACTIVE | Noted: 2021-05-12

## 2021-05-12 PROCEDURE — 93000 ELECTROCARDIOGRAM COMPLETE: CPT

## 2021-05-12 PROCEDURE — 99072 ADDL SUPL MATRL&STAF TM PHE: CPT

## 2021-05-12 PROCEDURE — 99386 PREV VISIT NEW AGE 40-64: CPT | Mod: 25

## 2021-05-12 RX ORDER — PNV NO.95/FERROUS FUM/FOLIC AC 28MG-0.8MG
TABLET ORAL
Refills: 0 | Status: DISCONTINUED | COMMUNITY
End: 2021-05-12

## 2021-05-12 RX ORDER — TOPIRAMATE 100 MG/1
100 TABLET, FILM COATED ORAL
Qty: 180 | Refills: 2 | Status: DISCONTINUED | COMMUNITY
Start: 2020-05-14 | End: 2021-05-12

## 2021-05-12 RX ORDER — VENLAFAXINE HYDROCHLORIDE 75 MG/1
75 CAPSULE, EXTENDED RELEASE ORAL
Qty: 7 | Refills: 0 | Status: DISCONTINUED | COMMUNITY
Start: 2018-06-26 | End: 2021-05-12

## 2021-05-12 RX ORDER — VITAMIN B COMPLEX
TABLET ORAL
Refills: 0 | Status: DISCONTINUED | COMMUNITY
End: 2021-05-12

## 2021-05-12 NOTE — HISTORY OF PRESENT ILLNESS
[FreeTextEntry1] : New Patient [de-identified] : RODDY is a 40 year female here as a new patient establishing care. Previous PMD was Dr. Mckay. Currently on  mg, Atorvastatin 40 mg, Effexor 150 mg, Metformin 500 mg, rx'd by previous PMD. Reports her periods are normal. Has an IUD. She is followed by neurologist Dr. Condon for migraines. On Topamax 100 mg twice a day but does not take it everyday because of side effects. Takes Aleve for her breakthrough migraines. Has a hx of strokes, s/p angioplasty and is being managed by neurosurgeon at [place]. Reports she has a loop recorder implant that shes had since 2016. States it was placed after her second stroke and was never followed up on. Patient has sleep apnea. Does not use CPAP machine. Currently on Effexor 150 mg. States she does not feel this dose has been working. Feels her anxiety is out of control and cant seem to keep it in check. It is impacting her every day life. Explains Covid was very difficult for her causing her to go into a deep depression and amplifying her anxiety. The pandemic also caused her to lose control of her weight, which she hopes to get back in control. Admits she is an over eater. In terms of her depression today, she is doing better. States her depression presents intermittently. Patient is c/o restless leg syndrome. Patient also reports she developed cellulitis on her left arm 2 months ago. Soon after she developed cold sores on her mouth and tongue. Patient is wondering if they are correlated. Does note she received the Covid-19 vaccine before sx developed. \par

## 2021-05-12 NOTE — HEALTH RISK ASSESSMENT
[Patient reported PAP Smear was normal] : Patient reported PAP Smear was normal [Patient reported mammogram was normal] : Patient reported mammogram was normal [Good] : ~his/her~  mood as  good [None] : None [Feels Safe at Home] : Feels safe at home [Fully functional (bathing, dressing, toileting, transferring, walking, feeding)] : Fully functional (bathing, dressing, toileting, transferring, walking, feeding) [Fully functional (using the telephone, shopping, preparing meals, housekeeping, doing laundry, using] : Fully functional and needs no help or supervision to perform IADLs (using the telephone, shopping, preparing meals, housekeeping, doing laundry, using transportation, managing medications and managing finances) [No] : In the past 12 months have you used drugs other than those required for medical reasons? No [With Family] : lives with family [Employed] : employed [Single] : single [# Of Children ___] : has [unfilled] children [] : No [MammogramDate] : 01/18 [MammogramComments] : eneida [PapSmearDate] : 01/19 [ColonoscopyDate] : 03/20 [FreeTextEntry2] : [employer] employee, works in revenue

## 2021-05-12 NOTE — ADDENDUM
[FreeTextEntry1] : I, Yovana Fulton acting as a scribe for Dr. Yahaira Maguire on May 12, 2021  at 12:37 PM\par

## 2021-05-12 NOTE — PLAN
[FreeTextEntry1] : \par - Blood work today \par - Mammogram and Sonogram ordered\par - F/u with GYN and cardiology \par - Cold Sores: Likely an immuno response. Advised Lysine, Vitamin C and D  \par - Restless Leg Syndrome: Will check Iron and ferritin level.\par - Sleep Apnea: Will refer to sleep specialist \par \par Anxiety:\par - Venlafaxine increased to 225 mg \par - Will have Cody contact patient\par - Highly encouraged patient find the time for self care \par - Advised the value of daily exercise such as daily walk of at least 30 minutes; YOGA, meditation ect. Strategies reviewed to ensure adequate sleep and good Nutriton with avoidance of sugar beverages and high calorie sweetened foods. Advised therapeutic breathing \par

## 2021-05-12 NOTE — END OF VISIT
[FreeTextEntry3] : Medical record entries made by the scribe today today, were at my direction and personally dictated to them by me, Dr. Yahaira Maguire on May 12, 2021. I have reviewed the chart and agree that the record accurately reflects my personal performance of the history, physical exam, assessment, and plan.\par

## 2021-05-13 LAB
25(OH)D3 SERPL-MCNC: 17.2 NG/ML
ALBUMIN SERPL ELPH-MCNC: 4.2 G/DL
ALP BLD-CCNC: 118 U/L
ALT SERPL-CCNC: 12 U/L
ANION GAP SERPL CALC-SCNC: 15 MMOL/L
APPEARANCE: ABNORMAL
AST SERPL-CCNC: 17 U/L
BACTERIA: ABNORMAL
BASOPHILS # BLD AUTO: 0.04 K/UL
BASOPHILS NFR BLD AUTO: 0.7 %
BILIRUB SERPL-MCNC: 0.4 MG/DL
BILIRUBIN URINE: NEGATIVE
BLOOD URINE: ABNORMAL
BUN SERPL-MCNC: 7 MG/DL
CALCIUM SERPL-MCNC: 9.3 MG/DL
CHLORIDE SERPL-SCNC: 98 MMOL/L
CHOLEST SERPL-MCNC: 242 MG/DL
CO2 SERPL-SCNC: 23 MMOL/L
COLOR: YELLOW
CREAT SERPL-MCNC: 0.59 MG/DL
EOSINOPHIL # BLD AUTO: 0.18 K/UL
EOSINOPHIL NFR BLD AUTO: 3.1 %
ESTIMATED AVERAGE GLUCOSE: 128 MG/DL
FERRITIN SERPL-MCNC: 10 NG/ML
FERRITIN SERPL-MCNC: 8 NG/ML
FOLATE SERPL-MCNC: 8 NG/ML
GLUCOSE QUALITATIVE U: NEGATIVE
GLUCOSE SERPL-MCNC: 90 MG/DL
HBA1C MFR BLD HPLC: 6.1 %
HCT VFR BLD CALC: 38.1 %
HDLC SERPL-MCNC: 71 MG/DL
HGB BLD-MCNC: 10.9 G/DL
HYALINE CASTS: 0 /LPF
IMM GRANULOCYTES NFR BLD AUTO: 0.2 %
IRON SATN MFR SERPL: 6 %
IRON SERPL-MCNC: 29 UG/DL
KETONES URINE: NEGATIVE
LDLC SERPL CALC-MCNC: 149 MG/DL
LEUKOCYTE ESTERASE URINE: ABNORMAL
LYMPHOCYTES # BLD AUTO: 1.49 K/UL
LYMPHOCYTES NFR BLD AUTO: 25.5 %
MAN DIFF?: NORMAL
MCHC RBC-ENTMCNC: 22.4 PG
MCHC RBC-ENTMCNC: 28.6 GM/DL
MCV RBC AUTO: 78.4 FL
MICROSCOPIC-UA: NORMAL
MONOCYTES # BLD AUTO: 0.49 K/UL
MONOCYTES NFR BLD AUTO: 8.4 %
NEUTROPHILS # BLD AUTO: 3.64 K/UL
NEUTROPHILS NFR BLD AUTO: 62.1 %
NITRITE URINE: NEGATIVE
NONHDLC SERPL-MCNC: 171 MG/DL
PH URINE: 7
PLATELET # BLD AUTO: 530 K/UL
POTASSIUM SERPL-SCNC: 4.2 MMOL/L
PROT SERPL-MCNC: 7.3 G/DL
PROTEIN URINE: ABNORMAL
RBC # BLD: 4.86 M/UL
RBC # FLD: 15.3 %
RED BLOOD CELLS URINE: 7 /HPF
SODIUM SERPL-SCNC: 135 MMOL/L
SPECIFIC GRAVITY URINE: 1.02
SQUAMOUS EPITHELIAL CELLS: 13 /HPF
TIBC SERPL-MCNC: 492 UG/DL
TRIGL SERPL-MCNC: 110 MG/DL
TSH SERPL-ACNC: 1.83 UIU/ML
UIBC SERPL-MCNC: 463 UG/DL
URATE SERPL-MCNC: 5 MG/DL
UROBILINOGEN URINE: NORMAL
VIT B12 SERPL-MCNC: 305 PG/ML
WBC # FLD AUTO: 5.85 K/UL
WHITE BLOOD CELLS URINE: 40 /HPF

## 2021-05-18 ENCOUNTER — NON-APPOINTMENT (OUTPATIENT)
Age: 41
End: 2021-05-18

## 2021-05-27 ENCOUNTER — TRANSCRIPTION ENCOUNTER (OUTPATIENT)
Age: 41
End: 2021-05-27

## 2021-06-29 ENCOUNTER — NON-APPOINTMENT (OUTPATIENT)
Age: 41
End: 2021-06-29

## 2021-06-30 ENCOUNTER — TRANSCRIPTION ENCOUNTER (OUTPATIENT)
Age: 41
End: 2021-06-30

## 2021-07-10 ENCOUNTER — TRANSCRIPTION ENCOUNTER (OUTPATIENT)
Age: 41
End: 2021-07-10

## 2021-07-14 ENCOUNTER — APPOINTMENT (OUTPATIENT)
Dept: OBGYN | Facility: CLINIC | Age: 41
End: 2021-07-14
Payer: COMMERCIAL

## 2021-07-14 VITALS
HEIGHT: 63 IN | WEIGHT: 255 LBS | SYSTOLIC BLOOD PRESSURE: 120 MMHG | BODY MASS INDEX: 45.18 KG/M2 | DIASTOLIC BLOOD PRESSURE: 80 MMHG

## 2021-07-14 LAB — HCG UR QL: NEGATIVE

## 2021-07-14 PROCEDURE — 99396 PREV VISIT EST AGE 40-64: CPT

## 2021-07-14 PROCEDURE — 81025 URINE PREGNANCY TEST: CPT

## 2021-07-20 LAB
CYTOLOGY CVX/VAG DOC THIN PREP: NORMAL
HPV HIGH+LOW RISK DNA PNL CVX: NOT DETECTED

## 2021-08-09 ENCOUNTER — TRANSCRIPTION ENCOUNTER (OUTPATIENT)
Age: 41
End: 2021-08-09

## 2021-08-17 ENCOUNTER — APPOINTMENT (OUTPATIENT)
Dept: OBGYN | Facility: CLINIC | Age: 41
End: 2021-08-17
Payer: COMMERCIAL

## 2021-08-17 ENCOUNTER — ASOB RESULT (OUTPATIENT)
Age: 41
End: 2021-08-17

## 2021-08-17 VITALS — BODY MASS INDEX: 45.18 KG/M2 | HEIGHT: 63 IN | WEIGHT: 255 LBS

## 2021-08-17 DIAGNOSIS — Z30.431 ENCOUNTER FOR ROUTINE CHECKING OF INTRAUTERINE CONTRACEPTIVE DEVICE: ICD-10-CM

## 2021-08-17 PROCEDURE — 99213 OFFICE O/P EST LOW 20 MIN: CPT

## 2021-08-17 PROCEDURE — 76830 TRANSVAGINAL US NON-OB: CPT

## 2021-08-17 PROCEDURE — 76856 US EXAM PELVIC COMPLETE: CPT | Mod: 59

## 2021-09-02 PROBLEM — Z30.431 IUD CHECK UP: Status: ACTIVE | Noted: 2019-06-12

## 2021-09-02 NOTE — PLAN
[FreeTextEntry1] : 40 year old female wwe\par \par 1. Pap done\par 2. Rx screening mammo\par 3. Family history of breast and colon cancer - counseled on genetic testing.  +/- of MyRisk testing discussed with the patient.  She will think about her options.  She is due for her mammo.  She had a screening colonoscopy in 2020. \par 4. RTO for sono to check IUD position per patient request\par 5. Annual exam in 1 year

## 2021-09-02 NOTE — HISTORY OF PRESENT ILLNESS
[FreeTextEntry1] : 40 year old female presents for sono to confirm IUD placement.  She has no complaints today.  Menses are every 28 days, lasting about 7 days.  She denies pelvic pain.

## 2021-09-02 NOTE — PLAN
[FreeTextEntry1] : 40 year old female with Paragard IUD.  Sono reviewed with the patient.  IUD in the correct position.  No abnormalities noted.  The patient was given the opportunity to ask questions and all were answered to her satisfaction.  Will RTO in 1 year for wwe.

## 2021-09-02 NOTE — HISTORY OF PRESENT ILLNESS
[FreeTextEntry1] : 40 year old female presents for wwe.  She has no complaints today.  Menses are every 28 days, lasting about 7 days.  She has a Paragard IUD that was placed in 2019.  She had a sono to confirm placement but is requesting another sono to assure it is in the correct position.  She denies pelvic pain.  She has a history of an abnormal pap.  She has an extensive medical history that is significant for a stroke, DM, HTN, elevated cholesterol and anemia.  PSH includes and angioplasty of the brain, CS, tonsillectomy, cholecystectomy, ankle surgery and gastric sleeve.  Family history is significant for a maternal grandmother with breast cancer under the age of 50.  Her mother was also diagnosed with colon cancer at age 50.  She is a  (etop x2).  She is allergic to ceclor.  She takes ramipril, atorvastatin, and metformin.

## 2021-10-03 ENCOUNTER — TRANSCRIPTION ENCOUNTER (OUTPATIENT)
Age: 41
End: 2021-10-03

## 2021-10-18 ENCOUNTER — OUTPATIENT (OUTPATIENT)
Dept: OUTPATIENT SERVICES | Facility: HOSPITAL | Age: 41
LOS: 1 days | End: 2021-10-18
Payer: COMMERCIAL

## 2021-10-18 ENCOUNTER — RESULT REVIEW (OUTPATIENT)
Age: 41
End: 2021-10-18

## 2021-10-18 ENCOUNTER — APPOINTMENT (OUTPATIENT)
Dept: ULTRASOUND IMAGING | Facility: CLINIC | Age: 41
End: 2021-10-18
Payer: COMMERCIAL

## 2021-10-18 ENCOUNTER — APPOINTMENT (OUTPATIENT)
Dept: MAMMOGRAPHY | Facility: CLINIC | Age: 41
End: 2021-10-18
Payer: COMMERCIAL

## 2021-10-18 DIAGNOSIS — Z00.00 ENCOUNTER FOR GENERAL ADULT MEDICAL EXAMINATION WITHOUT ABNORMAL FINDINGS: ICD-10-CM

## 2021-10-18 PROCEDURE — 77063 BREAST TOMOSYNTHESIS BI: CPT | Mod: 26

## 2021-10-18 PROCEDURE — 77063 BREAST TOMOSYNTHESIS BI: CPT

## 2021-10-18 PROCEDURE — 76641 ULTRASOUND BREAST COMPLETE: CPT

## 2021-10-18 PROCEDURE — 76641 ULTRASOUND BREAST COMPLETE: CPT | Mod: 26,50

## 2021-10-18 PROCEDURE — 77067 SCR MAMMO BI INCL CAD: CPT

## 2021-10-18 PROCEDURE — 77067 SCR MAMMO BI INCL CAD: CPT | Mod: 26

## 2021-12-07 ENCOUNTER — TRANSCRIPTION ENCOUNTER (OUTPATIENT)
Age: 41
End: 2021-12-07

## 2021-12-17 ENCOUNTER — TRANSCRIPTION ENCOUNTER (OUTPATIENT)
Age: 41
End: 2021-12-17

## 2021-12-18 RX ORDER — MULTIVITAMIN
TABLET ORAL
Refills: 0 | Status: ACTIVE | COMMUNITY

## 2021-12-21 ENCOUNTER — APPOINTMENT (OUTPATIENT)
Dept: CARDIOLOGY | Facility: CLINIC | Age: 41
End: 2021-12-21

## 2021-12-26 ENCOUNTER — RX RENEWAL (OUTPATIENT)
Age: 41
End: 2021-12-26

## 2022-01-08 ENCOUNTER — TRANSCRIPTION ENCOUNTER (OUTPATIENT)
Age: 42
End: 2022-01-08

## 2022-01-13 ENCOUNTER — TRANSCRIPTION ENCOUNTER (OUTPATIENT)
Age: 42
End: 2022-01-13

## 2022-02-03 ENCOUNTER — APPOINTMENT (OUTPATIENT)
Dept: FAMILY MEDICINE | Facility: CLINIC | Age: 42
End: 2022-02-03
Payer: COMMERCIAL

## 2022-02-03 VITALS
OXYGEN SATURATION: 98 % | SYSTOLIC BLOOD PRESSURE: 170 MMHG | BODY MASS INDEX: 43.77 KG/M2 | WEIGHT: 247 LBS | HEIGHT: 63 IN | TEMPERATURE: 97.5 F | HEART RATE: 98 BPM | DIASTOLIC BLOOD PRESSURE: 110 MMHG

## 2022-02-03 VITALS — DIASTOLIC BLOOD PRESSURE: 94 MMHG | SYSTOLIC BLOOD PRESSURE: 132 MMHG

## 2022-02-03 DIAGNOSIS — E61.1 IRON DEFICIENCY: ICD-10-CM

## 2022-02-03 DIAGNOSIS — G25.81 RESTLESS LEGS SYNDROME: ICD-10-CM

## 2022-02-03 DIAGNOSIS — G47.00 INSOMNIA, UNSPECIFIED: ICD-10-CM

## 2022-02-03 PROCEDURE — 99214 OFFICE O/P EST MOD 30 MIN: CPT

## 2022-02-03 NOTE — PLAN
[FreeTextEntry1] : - Labs drawn in office today\par - START: Zolpidem Tartrate 5 MG Oral Tablet; Take 1 Tablet by Mouth at Bedtime as needed. Maxium daily dose is: 1 Tablet MDD: 1 tab\par - Referral provided for: [facility]\par - Referral Provided for: Sleep disorder specialist\par - Adv. seeing hematology pending bw (iron/ferritin levels)\par

## 2022-02-03 NOTE — ADDENDUM
[FreeTextEntry1] : I, Nicky Newberry acting as a scribe for Dr. Yahaira Maguire on February 3, 2022.\par

## 2022-02-03 NOTE — HISTORY OF PRESENT ILLNESS
[FreeTextEntry8] : 40yo F presents with insomnia. Pt has hx of restless leg and states she has been taking iron supplement as directed. Pt states restless leg does not happen everyday, reports it happens 60% of the time and is a contributing factor to her insomnia. Pt states she feels like she can't " calm down her thoughts" cannot sleep. \par Pt reports that her mood is generally good however, when she lays down at night her depression is significantly worse. \par pt reports she is waking up late, getting to work late, daughter missing her bus - reports the insomnia is now impacting her life too much. Pt stated she has been compliant with taking Effexor  MG Oral Capsule Extended release 24 Hour and Melatonin 10 MG. Patient stated she had seen a  for 4 sessions with the goal of finding a psychiatrist but the sessions had abruptly stopped. Patient expressed desire to restart Ambien for sleep relief. Patient stated she will f/u with Dr. Baig regarding HTN post- blood work. Patient stated she was prescribed Metformin HCI by previous PCP and has not taken it in a long time. Patient stated she is willing to see hematology if iron/ferritin levels don’t improve. Patient stated that she is unable to use CPAP machine for sleep assistance and is wiling to see a sleep specialist. Patient stated that she was dx with severe sleep apnea around 2014. Patient stated that she monitors bp at home and averages 130/92.

## 2022-02-03 NOTE — END OF VISIT
[FreeTextEntry3] : Medical record entries made by the scribe today today, were at my direction and personally dictated to them by me, Dr. Yahaira Maguire on Feb 3, 2022. I have reviewed the chart and agree that the record accurately reflects my personal performance of the history, physical exam, assessment, and plan.\par

## 2022-02-04 LAB
25(OH)D3 SERPL-MCNC: 16.5 NG/ML
ALBUMIN SERPL ELPH-MCNC: 4.2 G/DL
ALP BLD-CCNC: 124 U/L
ALT SERPL-CCNC: 8 U/L
ANION GAP SERPL CALC-SCNC: 14 MMOL/L
AST SERPL-CCNC: 14 U/L
BASOPHILS # BLD AUTO: 0.04 K/UL
BASOPHILS NFR BLD AUTO: 0.7 %
BILIRUB SERPL-MCNC: 0.2 MG/DL
BUN SERPL-MCNC: 9 MG/DL
CALCIUM SERPL-MCNC: 9.1 MG/DL
CHLORIDE SERPL-SCNC: 98 MMOL/L
CHOLEST SERPL-MCNC: 259 MG/DL
CO2 SERPL-SCNC: 24 MMOL/L
CREAT SERPL-MCNC: 0.61 MG/DL
EOSINOPHIL # BLD AUTO: 0.17 K/UL
EOSINOPHIL NFR BLD AUTO: 2.9 %
ESTIMATED AVERAGE GLUCOSE: 134 MG/DL
FERRITIN SERPL-MCNC: 12 NG/ML
GLUCOSE SERPL-MCNC: 163 MG/DL
HBA1C MFR BLD HPLC: 6.3 %
HCT VFR BLD CALC: 40.1 %
HDLC SERPL-MCNC: 69 MG/DL
HGB BLD-MCNC: 12.4 G/DL
IMM GRANULOCYTES NFR BLD AUTO: 0.2 %
IRON SATN MFR SERPL: 9 %
IRON SERPL-MCNC: 39 UG/DL
LDLC SERPL CALC-MCNC: 158 MG/DL
LYMPHOCYTES # BLD AUTO: 1.45 K/UL
LYMPHOCYTES NFR BLD AUTO: 24.7 %
MAN DIFF?: NORMAL
MCHC RBC-ENTMCNC: 26.4 PG
MCHC RBC-ENTMCNC: 30.9 GM/DL
MCV RBC AUTO: 85.5 FL
MONOCYTES # BLD AUTO: 0.43 K/UL
MONOCYTES NFR BLD AUTO: 7.3 %
NEUTROPHILS # BLD AUTO: 3.76 K/UL
NEUTROPHILS NFR BLD AUTO: 64.2 %
NONHDLC SERPL-MCNC: 191 MG/DL
PLATELET # BLD AUTO: 463 K/UL
POTASSIUM SERPL-SCNC: 4 MMOL/L
PROT SERPL-MCNC: 7.1 G/DL
RBC # BLD: 4.69 M/UL
RBC # FLD: 12.9 %
SODIUM SERPL-SCNC: 136 MMOL/L
TIBC SERPL-MCNC: 452 UG/DL
TRIGL SERPL-MCNC: 164 MG/DL
TSH SERPL-ACNC: 2.18 UIU/ML
UIBC SERPL-MCNC: 413 UG/DL
URATE SERPL-MCNC: 5 MG/DL
WBC # FLD AUTO: 5.86 K/UL

## 2022-02-10 ENCOUNTER — NON-APPOINTMENT (OUTPATIENT)
Age: 42
End: 2022-02-10

## 2022-05-27 ENCOUNTER — NON-APPOINTMENT (OUTPATIENT)
Age: 42
End: 2022-05-27

## 2022-08-02 ENCOUNTER — RX RENEWAL (OUTPATIENT)
Age: 42
End: 2022-08-02

## 2022-08-05 ENCOUNTER — TRANSCRIPTION ENCOUNTER (OUTPATIENT)
Age: 42
End: 2022-08-05

## 2022-08-08 ENCOUNTER — APPOINTMENT (OUTPATIENT)
Dept: FAMILY MEDICINE | Facility: CLINIC | Age: 42
End: 2022-08-08

## 2022-08-11 ENCOUNTER — NON-APPOINTMENT (OUTPATIENT)
Age: 42
End: 2022-08-11

## 2022-09-14 ENCOUNTER — NON-APPOINTMENT (OUTPATIENT)
Age: 42
End: 2022-09-14

## 2022-09-14 ENCOUNTER — APPOINTMENT (OUTPATIENT)
Dept: OBGYN | Facility: CLINIC | Age: 42
End: 2022-09-14

## 2022-09-14 VITALS
BODY MASS INDEX: 43.41 KG/M2 | DIASTOLIC BLOOD PRESSURE: 104 MMHG | SYSTOLIC BLOOD PRESSURE: 172 MMHG | HEIGHT: 63 IN | WEIGHT: 245 LBS

## 2022-09-14 PROCEDURE — 99396 PREV VISIT EST AGE 40-64: CPT

## 2022-09-14 NOTE — PLAN
[FreeTextEntry1] : 41 year old female wwe\par \par 1. Pap done\par 2. Rx screening mammo\par 3. Family history of breast and colon cancer - counseled on genetic testing.  +/- of MyRisk testing discussed with the patient.  She will think about her options.  She is due for her mammo.  She had a screening colonoscopy in 2020. \par 4.  ParaGard IUD good until 2029\par 5. Annual exam in 1 year

## 2022-09-14 NOTE — PHYSICAL EXAM
[Chaperone Declined] : Patient declined chaperone [Appropriately responsive] : appropriately responsive [Alert] : alert [No Acute Distress] : no acute distress [No Lymphadenopathy] : no lymphadenopathy [Regular Rate Rhythm] : regular rate rhythm [No Murmurs] : no murmurs [Clear to Auscultation B/L] : clear to auscultation bilaterally [Soft] : soft [Non-tender] : non-tender [Non-distended] : non-distended [No HSM] : No HSM [No Lesions] : no lesions [No Mass] : no mass [Oriented x3] : oriented x3 [Examination Of The Breasts] : a normal appearance [No Masses] : no breast masses were palpable [Labia Majora] : normal [Labia Minora] : normal [IUD String] : an IUD string was noted [Normal] : normal [Uterine Adnexae] : normal

## 2022-09-14 NOTE — HISTORY OF PRESENT ILLNESS
[FreeTextEntry1] : 41 year old female presents for wwe.  She has no complaints today.  She states over the past 2 or 3 months she has had recurrent UTI and yeast infections.  She is asymptomatic today.  Menses are every 28 days, lasting about 7 days.  She has a Paragard IUD that was placed in 2019.  She is happy with the IUD.  She has a history of an abnormal pap.  She has an extensive medical history that is significant for a stroke, DM, HTN, elevated cholesterol and anemia.  PSH includes and angioplasty of the brain, CS, tonsillectomy, cholecystectomy, ankle surgery and gastric sleeve.  Family history is significant for a maternal grandmother with breast cancer under the age of 50.  Her mother was also diagnosed with colon cancer at age 50.  She is a  (etop x2).  She is allergic to ceclor.  She takes ramipril, atorvastatin, and metformin.

## 2022-09-20 ENCOUNTER — TRANSCRIPTION ENCOUNTER (OUTPATIENT)
Age: 42
End: 2022-09-20

## 2022-09-20 LAB
CYTOLOGY CVX/VAG DOC THIN PREP: ABNORMAL
HPV HIGH+LOW RISK DNA PNL CVX: NOT DETECTED

## 2022-09-21 ENCOUNTER — APPOINTMENT (OUTPATIENT)
Dept: ORTHOPEDIC SURGERY | Facility: CLINIC | Age: 42
End: 2022-09-21

## 2022-09-21 VITALS — HEIGHT: 63 IN | BODY MASS INDEX: 43.41 KG/M2 | WEIGHT: 245 LBS

## 2022-09-21 DIAGNOSIS — S83.8X2D SPRAIN OF OTHER SPECIFIED PARTS OF LEFT KNEE, SUBSEQUENT ENCOUNTER: ICD-10-CM

## 2022-09-21 PROCEDURE — 73562 X-RAY EXAM OF KNEE 3: CPT | Mod: LT

## 2022-09-21 PROCEDURE — 99214 OFFICE O/P EST MOD 30 MIN: CPT

## 2022-09-21 NOTE — PHYSICAL EXAM
[NL (0)] : extension 0 degrees [4___] : quadriceps 4[unfilled]/5 [5___] : hamstring 5[unfilled]/5 [] : light touch is intact throughout [Left] : left knee [There are no fractures, subluxations or dislocations. No significant abnormalities are seen] : There are no fractures, subluxations or dislocations. No significant abnormalities are seen [TWNoteComboBox7] : flexion 120 degrees

## 2022-09-21 NOTE — HISTORY OF PRESENT ILLNESS
[6] : 6 [8] : 8 [Burning] : burning [Dull/Aching] : dull/aching [Shooting] : shooting [Stabbing] : stabbing [Full time] : Work status: full time [de-identified] : 9/21/22:  left knee pain for several weeks without injury.   [] : no [FreeTextEntry1] : left knee  [FreeTextEntry9] : motrin

## 2022-09-21 NOTE — ASSESSMENT
[FreeTextEntry1] : left knee pain for several weeks.  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  not getting better with rest, nsaids, hep. some mild to moderate pf oa.\par \par history of 2 cva.  uncertain origin. history of left knee sided weakness but now better.  diabetes and htn.

## 2022-09-22 ENCOUNTER — APPOINTMENT (OUTPATIENT)
Dept: MRI IMAGING | Facility: CLINIC | Age: 42
End: 2022-09-22

## 2022-09-23 ENCOUNTER — FORM ENCOUNTER (OUTPATIENT)
Age: 42
End: 2022-09-23

## 2022-09-23 ENCOUNTER — APPOINTMENT (OUTPATIENT)
Dept: DERMATOLOGY | Facility: CLINIC | Age: 42
End: 2022-09-23

## 2022-09-23 PROCEDURE — 99213 OFFICE O/P EST LOW 20 MIN: CPT

## 2022-09-24 ENCOUNTER — APPOINTMENT (OUTPATIENT)
Dept: MRI IMAGING | Facility: CLINIC | Age: 42
End: 2022-09-24

## 2022-09-24 PROCEDURE — 73721 MRI JNT OF LWR EXTRE W/O DYE: CPT | Mod: LT

## 2022-09-28 ENCOUNTER — APPOINTMENT (OUTPATIENT)
Dept: ORTHOPEDIC SURGERY | Facility: CLINIC | Age: 42
End: 2022-09-28

## 2022-09-28 VITALS — BODY MASS INDEX: 43.41 KG/M2 | HEIGHT: 63 IN | WEIGHT: 245 LBS

## 2022-09-28 PROCEDURE — J3490M: CUSTOM

## 2022-09-28 PROCEDURE — 20611 DRAIN/INJ JOINT/BURSA W/US: CPT | Mod: LT

## 2022-09-28 PROCEDURE — 99214 OFFICE O/P EST MOD 30 MIN: CPT | Mod: 25

## 2022-09-28 NOTE — ASSESSMENT
[FreeTextEntry1] : left knee pain for several weeks.  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  not getting better with rest, nsaids, hep. some mild to moderate pf oa. mri 2022 shows mmt, oa.\par \par history of 2 cva.  uncertain origin. history of left knee sided weakness but now better.  diabetes and htn.

## 2022-09-28 NOTE — HISTORY OF PRESENT ILLNESS
[Full time] : Work status: full time [de-identified] : 9/21/22:  left knee pain for several weeks without injury.  \par 9/28/22: medial left knee pain persists. [6] : 6 [8] : 8 [Burning] : burning [Dull/Aching] : dull/aching [Shooting] : shooting [Stabbing] : stabbing [] : no [FreeTextEntry1] : left knee  [FreeTextEntry9] : motrin  [de-identified] : MRI done at [facility]

## 2022-09-28 NOTE — PROCEDURE
[Large Joint Injection] : Large joint injection [Left] : of the left [Knee] : knee [Pain] : pain [Alcohol] : alcohol [Betadine] : betadine [___ cc    3mg] :  Betamethasone (Celestone) ~Vcc of 3mg [___ cc    1%] : Lidocaine ~Vcc of 1%  [___ cc    0.25%] : Bupivacaine (Marcaine) ~Vcc of 0.25%  [] : Patient tolerated procedure well [Call if redness, pain or fever occur] : call if redness, pain or fever occur [Apply ice for 15min out of every hour for the next 12-24 hours as tolerated] : apply ice for 15 minutes out of every hour for the next 12-24 hours as tolerated [Patient was advised to rest the joint(s) for ____ days] : patient was advised to rest the joint(s) for [unfilled] days [Previous OTC use and PT nontherapeutic] : patient has tried OTC's including aspirin, Ibuprofen, Aleve, etc or prescription NSAIDS, and/or exercises at home and/or physical therapy without satisfactory response [Patient had decreased mobility in the joint] : patient had decreased mobility in the joint [Risks, benefits, alternatives discussed / Verbal consent obtained] : the risks benefits, and alternatives have been discussed, and verbal consent was obtained [All ultrasound images have been permanently captured and stored accordingly in our picture archiving and communication system] : All ultrasound images have been permanently captured and stored accordingly in our picture archiving and communication system [Visualization of the needle and placement of injection was performed without complication] : visualization of the needle and placement of injection was performed without complication [Inflammation] : inflammation [de-identified] : for accurate placement of needle into knee joint

## 2022-09-30 ENCOUNTER — APPOINTMENT (OUTPATIENT)
Dept: FAMILY MEDICINE | Facility: CLINIC | Age: 42
End: 2022-09-30

## 2022-09-30 VITALS
WEIGHT: 245 LBS | TEMPERATURE: 98.5 F | DIASTOLIC BLOOD PRESSURE: 100 MMHG | SYSTOLIC BLOOD PRESSURE: 158 MMHG | BODY MASS INDEX: 43.41 KG/M2 | HEART RATE: 85 BPM | OXYGEN SATURATION: 97 % | HEIGHT: 63 IN

## 2022-09-30 DIAGNOSIS — R32 UNSPECIFIED URINARY INCONTINENCE: ICD-10-CM

## 2022-09-30 PROCEDURE — 99214 OFFICE O/P EST MOD 30 MIN: CPT | Mod: 25

## 2022-09-30 PROCEDURE — 93000 ELECTROCARDIOGRAM COMPLETE: CPT

## 2022-09-30 RX ORDER — VENLAFAXINE HYDROCHLORIDE 150 MG/1
150 CAPSULE, EXTENDED RELEASE ORAL DAILY
Refills: 0 | Status: DISCONTINUED | COMMUNITY
End: 2022-09-30

## 2022-09-30 RX ORDER — BEMPEDOIC ACID 180 MG/1
180 TABLET, FILM COATED ORAL
Refills: 0 | Status: DISCONTINUED | COMMUNITY
End: 2022-09-30

## 2022-09-30 RX ORDER — ZOLPIDEM TARTRATE 5 MG/1
5 TABLET ORAL
Qty: 30 | Refills: 0 | Status: DISCONTINUED | COMMUNITY
Start: 2022-02-03 | End: 2022-09-30

## 2022-09-30 NOTE — HISTORY OF PRESENT ILLNESS
[FreeTextEntry8] : 40yo F with PMHx of HTN, HLD, CVA (2014 &2016) presents with elevated BP for the past two weeks. Pt was seen in OBGYN office 2 weeks ago with elevated pressure. Pt reports at home she has had BP of 186/126, 178/11, and 189/116. Pt reports no headaches, weakness, numbness, tingling. Pt reports she gets flushed whenever her BP is high. Pt currently on Rampiril 10mg BID and amlodiping 10mg qd. Pt reports she takes medication daily as directed. Pt has not been to cardiologist since December 2021. According to note, states she should start HCTZ 12.5mg but patient reports she has never taken that medication before. Pt denies any CP, palpitations, or recent illness/injury.

## 2022-09-30 NOTE — PLAN
[FreeTextEntry1] : Hypertension: \par - Continue Amlodipine 10mg\par -Continue Rampiril 10mg BID \par - Start HCTZ 25mg \par - Continue to monitor BP at home\par - CMP checked\par \par HLD: \par - Continue atorvastatin 40mg\par - BW drawn \par \par Pre-Diabetic: \par - Continue metformin 500mg BID \par \par Anxiety:\par - Continue Venlafaxine 225mg

## 2022-10-01 ENCOUNTER — TRANSCRIPTION ENCOUNTER (OUTPATIENT)
Age: 42
End: 2022-10-01

## 2022-10-05 ENCOUNTER — APPOINTMENT (OUTPATIENT)
Dept: OBGYN | Facility: CLINIC | Age: 42
End: 2022-10-05

## 2022-10-05 ENCOUNTER — NON-APPOINTMENT (OUTPATIENT)
Age: 42
End: 2022-10-05

## 2022-10-05 VITALS
WEIGHT: 245 LBS | DIASTOLIC BLOOD PRESSURE: 90 MMHG | BODY MASS INDEX: 43.41 KG/M2 | HEIGHT: 63 IN | SYSTOLIC BLOOD PRESSURE: 150 MMHG

## 2022-10-05 LAB
ALBUMIN SERPL ELPH-MCNC: 4.5 G/DL
ALP BLD-CCNC: 111 U/L
ALT SERPL-CCNC: 10 U/L
ANION GAP SERPL CALC-SCNC: 19 MMOL/L
AST SERPL-CCNC: 13 U/L
BASOPHILS # BLD AUTO: 0.03 K/UL
BASOPHILS NFR BLD AUTO: 0.3 %
BILIRUB SERPL-MCNC: 0.2 MG/DL
BUN SERPL-MCNC: 9 MG/DL
CALCIUM SERPL-MCNC: 9.7 MG/DL
CHLORIDE SERPL-SCNC: 98 MMOL/L
CHOLEST SERPL-MCNC: 271 MG/DL
CO2 SERPL-SCNC: 23 MMOL/L
CREAT SERPL-MCNC: 0.55 MG/DL
EGFR: 117 ML/MIN/1.73M2
EOSINOPHIL # BLD AUTO: 0.08 K/UL
EOSINOPHIL NFR BLD AUTO: 0.8 %
ESTIMATED AVERAGE GLUCOSE: 140 MG/DL
GLUCOSE SERPL-MCNC: 83 MG/DL
HBA1C MFR BLD HPLC: 6.5 %
HCT VFR BLD CALC: 42 %
HDLC SERPL-MCNC: 78 MG/DL
HGB BLD-MCNC: 12.6 G/DL
IMM GRANULOCYTES NFR BLD AUTO: 0.3 %
LDLC SERPL CALC-MCNC: 154 MG/DL
LYMPHOCYTES # BLD AUTO: 2.7 K/UL
LYMPHOCYTES NFR BLD AUTO: 27.4 %
MAN DIFF?: NORMAL
MCHC RBC-ENTMCNC: 25.7 PG
MCHC RBC-ENTMCNC: 30 GM/DL
MCV RBC AUTO: 85.5 FL
MONOCYTES # BLD AUTO: 0.83 K/UL
MONOCYTES NFR BLD AUTO: 8.4 %
NEUTROPHILS # BLD AUTO: 6.17 K/UL
NEUTROPHILS NFR BLD AUTO: 62.8 %
NONHDLC SERPL-MCNC: 194 MG/DL
PLATELET # BLD AUTO: 597 K/UL
POTASSIUM SERPL-SCNC: 4.3 MMOL/L
PROT SERPL-MCNC: 7.5 G/DL
RBC # BLD: 4.91 M/UL
RBC # FLD: 13.2 %
SODIUM SERPL-SCNC: 140 MMOL/L
TRIGL SERPL-MCNC: 201 MG/DL
TSH SERPL-ACNC: 3.21 UIU/ML
WBC # FLD AUTO: 9.84 K/UL

## 2022-10-05 PROCEDURE — 99213 OFFICE O/P EST LOW 20 MIN: CPT

## 2022-10-05 NOTE — HISTORY OF PRESENT ILLNESS
[FreeTextEntry1] : 41-year-old female presents for MyRisk testing.  She has a family history of pancreatic cancer in her father at the age of 54.  Her mother was diagnosed with colorectal cancer at the age of 50.  Her maternal grandmother was diagnosed with breast cancer under the age of 50.  She is unsure of the exact age of diagnosis.  Her paternal grandmother also had breast cancer but she does not have contact with outside of the family and does not know how old she was when she was diagnosed.  She has no complaints today.\par \par At the patient's last visit on September 14 she was noted to have elevated blood pressures.  The patient followed up with her primary care physician on September 30 and was started on another blood pressure medication.  The patient states the next day she did not feel well and went to the emergency room.  She states that her blood pressures were in the 180/110's.  She had a work-up that was negative.  She has a follow-up appointment with her primary care physician next week and plans to see a cardiologist.  Her blood pressure today is 150/90.

## 2022-10-05 NOTE — PLAN
[FreeTextEntry1] : 41-year-old female for MyRisk testing.  Saliva test done today.  All risks and benefits of genetic testing reviewed with the patient.  She will return to the office in 4 to 6 weeks to review the results.  The patient has been having close follow-up with her elevated blood pressures.  She is following with primary care and is planning to see cardiology.  Call parameters were reviewed.  She will return to the office in 4 to 6 weeks for results.  The patient was given the opportunity to ask questions and all were answered to her satisfaction.\par

## 2022-10-07 ENCOUNTER — NON-APPOINTMENT (OUTPATIENT)
Age: 42
End: 2022-10-07

## 2022-10-10 ENCOUNTER — APPOINTMENT (OUTPATIENT)
Dept: FAMILY MEDICINE | Facility: CLINIC | Age: 42
End: 2022-10-10

## 2022-10-14 ENCOUNTER — APPOINTMENT (OUTPATIENT)
Dept: FAMILY MEDICINE | Facility: CLINIC | Age: 42
End: 2022-10-14

## 2022-10-14 VITALS
OXYGEN SATURATION: 98 % | HEIGHT: 63 IN | DIASTOLIC BLOOD PRESSURE: 82 MMHG | HEART RATE: 82 BPM | SYSTOLIC BLOOD PRESSURE: 110 MMHG | WEIGHT: 231 LBS | TEMPERATURE: 97.7 F | BODY MASS INDEX: 40.93 KG/M2

## 2022-10-14 DIAGNOSIS — R73.03 PREDIABETES.: ICD-10-CM

## 2022-10-14 DIAGNOSIS — K59.00 CONSTIPATION, UNSPECIFIED: ICD-10-CM

## 2022-10-14 PROCEDURE — 99214 OFFICE O/P EST MOD 30 MIN: CPT

## 2022-10-14 NOTE — HISTORY OF PRESENT ILLNESS
[FreeTextEntry1] : Follow up HTN  [de-identified] : 40yo F presents for follow up on blood work and HTN. Pt reports she has been taking all medication compliantly. Pt reports she has some constipation she believes is due to diuretic. Pt reports she has not taken anything for the constipation. Pt denies any ALBERTO , CP or palpitations. Pt reports she has never seen a nephrologist before.

## 2022-10-14 NOTE — PLAN
[FreeTextEntry1] : Continue HCTZ 25mg\par Continue Rampiril 10mg BID \par Continue amlodipine 10mg \par Cotninue Atorvastatin 40mg\par Encourage to add fiber supplement to diet\par Decrease cabrohydrates/sugars\par increase physical activity\par Nephro referral given \par f/u 3 months

## 2022-10-14 NOTE — PHYSICAL EXAM
[Normal] : normal rate, regular rhythm, normal S1 and S2 and no murmur heard [Normal Gait] : normal gait [Normal Affect] : the affect was normal [Normal Insight/Judgement] : insight and judgment were intact

## 2022-10-15 LAB
ALBUMIN SERPL ELPH-MCNC: 4.6 G/DL
ALP BLD-CCNC: 131 U/L
ALT SERPL-CCNC: 13 U/L
ANION GAP SERPL CALC-SCNC: 12 MMOL/L
AST SERPL-CCNC: 13 U/L
BILIRUB SERPL-MCNC: 0.4 MG/DL
BUN SERPL-MCNC: 11 MG/DL
CALCIUM SERPL-MCNC: 10 MG/DL
CHLORIDE SERPL-SCNC: 96 MMOL/L
CO2 SERPL-SCNC: 28 MMOL/L
CREAT SERPL-MCNC: 0.69 MG/DL
EGFR: 111 ML/MIN/1.73M2
GLUCOSE SERPL-MCNC: 142 MG/DL
POTASSIUM SERPL-SCNC: 3.8 MMOL/L
PROT SERPL-MCNC: 7.8 G/DL
SODIUM SERPL-SCNC: 136 MMOL/L

## 2022-10-19 ENCOUNTER — NON-APPOINTMENT (OUTPATIENT)
Age: 42
End: 2022-10-19

## 2022-11-02 ENCOUNTER — APPOINTMENT (OUTPATIENT)
Dept: ORTHOPEDIC SURGERY | Facility: CLINIC | Age: 42
End: 2022-11-02

## 2022-11-02 VITALS — BODY MASS INDEX: 40.93 KG/M2 | HEIGHT: 63 IN | WEIGHT: 231 LBS

## 2022-11-02 PROCEDURE — 99214 OFFICE O/P EST MOD 30 MIN: CPT

## 2022-11-02 NOTE — ASSESSMENT
[FreeTextEntry1] : left knee pain for several weeks. mri 2022 shows mmt, oa. only short term relief with csi.  of note, patient had high blood pressure after last csi.  discussed options.  she may still have some pain from oa but mmt is large and treating that will likely help significantly.  do not feel resurfacing indicated at this time.  \par \par history of 2 cva.  uncertain origin. history of left knee sided weakness but now better.  diabetes and htn.

## 2022-11-02 NOTE — HISTORY OF PRESENT ILLNESS
[7] : 7 [de-identified] : 9/21/22:  left knee pain for several weeks without injury.  \par 9/28/22: medial left knee pain persists.\par 11/2/22: continued medial left knee pain. [FreeTextEntry1] : left knee  [de-identified] : PT\par csi: blood pressure went up, went to er

## 2022-11-02 NOTE — PHYSICAL EXAM
[NL (0)] : extension 0 degrees [4___] : quadriceps 4[unfilled]/5 [5___] : hamstring 5[unfilled]/5 [] : light touch is intact throughout [Left] : left knee [There are no fractures, subluxations or dislocations. No significant abnormalities are seen] : There are no fractures, subluxations or dislocations. No significant abnormalities are seen [TWNoteComboBox7] : flexion 115 degrees

## 2022-11-07 ENCOUNTER — FORM ENCOUNTER (OUTPATIENT)
Age: 42
End: 2022-11-07

## 2022-11-15 ENCOUNTER — TRANSCRIPTION ENCOUNTER (OUTPATIENT)
Age: 42
End: 2022-11-15

## 2022-11-16 ENCOUNTER — APPOINTMENT (OUTPATIENT)
Dept: OBGYN | Facility: CLINIC | Age: 42
End: 2022-11-16

## 2022-11-16 VITALS — HEIGHT: 63 IN | BODY MASS INDEX: 40.93 KG/M2 | WEIGHT: 231 LBS

## 2022-11-16 DIAGNOSIS — Z80.9 FAMILY HISTORY OF MALIGNANT NEOPLASM, UNSPECIFIED: ICD-10-CM

## 2022-11-16 PROCEDURE — 99213 OFFICE O/P EST LOW 20 MIN: CPT

## 2022-11-16 NOTE — HISTORY OF PRESENT ILLNESS
[FreeTextEntry1] : 41-year-old female presents for MyRisk testing results.  She has a family history of pancreatic cancer in her father at the age of 54.  Her mother was diagnosed with colorectal cancer at the age of 50.  Her maternal grandmother was diagnosed with breast cancer under the age of 50.  She is unsure of the exact age of diagnosis.  Her paternal grandmother also had breast cancer but she does not have contacts outside of the family and does not know how old she was when she was diagnosed.  She has no complaints today.

## 2022-11-16 NOTE — PLAN
[FreeTextEntry1] : 41-year-old female with negative MyRisk testing.  Patient was counseled on elevated lifetime risk of breast cancer at 23%.  We discussed the importance of monthly self breast exams and self breast awareness.  We discussed that she can have a clinical breast exam every 6 to 12 months.  We discussed doing mammograms yearly.  We discussed mammograms with MRIs staggered every 6 months for increased breast cancer surveillance.  Discussed with the patient that we typically start this 10 years younger than the earliest diagnosis.  She states she will get more information from her family about the ages of diagnosis of breast cancer in her family.  She is interested in doing biannual surveillance 10 years younger than her earliest diagnosis in the family.  We also discussed risk reducing strategies with medication and surgery.  She is not interested in seeing a breast surgeon at this time.  The patient was given the opportunity to ask questions and all were answered to her satisfaction.\par She will return to the office in September 2023 for her well woman exam.

## 2022-11-22 ENCOUNTER — NON-APPOINTMENT (OUTPATIENT)
Age: 42
End: 2022-11-22

## 2022-11-22 ENCOUNTER — APPOINTMENT (OUTPATIENT)
Dept: FAMILY MEDICINE | Facility: CLINIC | Age: 42
End: 2022-11-22

## 2022-11-22 VITALS
DIASTOLIC BLOOD PRESSURE: 90 MMHG | WEIGHT: 233 LBS | SYSTOLIC BLOOD PRESSURE: 120 MMHG | HEIGHT: 63 IN | BODY MASS INDEX: 41.29 KG/M2 | OXYGEN SATURATION: 95 % | TEMPERATURE: 97.5 F | HEART RATE: 88 BPM

## 2022-11-22 DIAGNOSIS — D75.839 THROMBOCYTOSIS, UNSPECIFIED: ICD-10-CM

## 2022-11-22 DIAGNOSIS — Z86.73 PERSONAL HISTORY OF TRANSIENT ISCHEMIC ATTACK (TIA), AND CEREBRAL INFARCTION W/OUT RESIDUAL DEFICITS: ICD-10-CM

## 2022-11-22 DIAGNOSIS — G47.33 OBSTRUCTIVE SLEEP APNEA (ADULT) (PEDIATRIC): ICD-10-CM

## 2022-11-22 PROCEDURE — 99214 OFFICE O/P EST MOD 30 MIN: CPT

## 2022-11-23 LAB
FERRITIN SERPL-MCNC: 25 NG/ML
IRON SATN MFR SERPL: 12 %
IRON SERPL-MCNC: 50 UG/DL
TIBC SERPL-MCNC: 408 UG/DL
TRANSFERRIN SERPL-MCNC: 330 MG/DL
UIBC SERPL-MCNC: 358 UG/DL

## 2022-11-23 NOTE — ASSESSMENT
[High Risk Surgery - Intraperitoneal, Intrathoracic or Supringuinal Vascular Procedures] : High Risk Surgery - Intraperitoneal, Intrathoracic or Supringuinal Vascular Procedures - No (0) [Ischemic Heart Disease] : Ischemic Heart Disease - No (0) [Congestive Heart Failure] : Congestive Heart Failure - No (0) [Prior Cerebrovascular Accident or TIA] : Prior Cerebrovascular Accident or TIA - Yes (1) [Creatinine >= 2mg/dL (1 Point)] : Creatinine >= 2mg/dL - No (0) [Insulin-dependent Diabetic (1 Point)] : Insulin-dependent Diabetic - No (0) [1] : 1 , RCRI Class: II, Risk of Post-Op Cardiac Complications: 6.0%, 95% CI for Risk Estimate: 4.9% - 7.4% [Modify anti-platelet treatment prior to procedure] : Modify anti-platelet treatment prior to procedure [Patient Optimized for Surgery] : Patient optimized for surgery [FreeTextEntry4] : 41 yo female with hx of left meniscus tear presents for pre-op exam for Left Knee arthroscopy, partial medial meniscectomy on 11/28/22 medically optimized for procedure.  [FreeTextEntry6] : Hold aspirin one week prior to procedure, may continue afterwards

## 2022-11-23 NOTE — PLAN
[FreeTextEntry1] : Pre-op exam: meniscus tear, f/u orthopedist\par Hx of CVA: two previous CVA, 2014, 2016, s/p cerebral angioplasty 8/2016, no residual deficits, no neuro deficits, c/w statin therapy, f/u neurology\par HTN: bp within acceptable range, c/w current regimen, recommend low salt diet, wt loss, exercise, DASH diet\par Diabetes mellitus: c/w current regimen, Recommend low carb diet, wt loss, exercise, f/u pmd\par NEGRITA: Discussed improved sleep hygiene, avoid sleeping in supine position, recommend wt loss/exercise, avoid driving while sleepy, avoid alcohol use prior to sleeping, f/u pulm\par Thrombocytosis: likely 2/2 iron def, low TSAT, c/w iron supplementation\par RTC 2 wks

## 2022-11-23 NOTE — HISTORY OF PRESENT ILLNESS
[No Pertinent Cardiac History] : no history of aortic stenosis, atrial fibrillation, coronary artery disease, recent myocardial infarction, or implantable device/pacemaker [Sleep Apnea] : sleep apnea [No Adverse Anesthesia Reaction] : no adverse anesthesia reaction in self or family member [Diabetes] : diabetes [(Patient denies any chest pain, claudication, dyspnea on exertion, orthopnea, palpitations or syncope)] : Patient denies any chest pain, claudication, dyspnea on exertion, orthopnea, palpitations or syncope [Moderate (4-6 METs)] : Moderate (4-6 METs) [Asthma] : no asthma [COPD] : no COPD [Smoker] : not a smoker [Chronic Anticoagulation] : no chronic anticoagulation [Chronic Kidney Disease] : no chronic kidney disease [FreeTextEntry1] : Left Knee arthroscopy, partial medial meniscectomy [FreeTextEntry2] : 11/28/2022 [FreeTextEntry3] : Dr. Espinoza [FreeTextEntry4] : 41 yo female with hx of left meniscus tear presents for pre-op exam for Left Knee arthroscopy, partial medial meniscectomy on 11/28/22. Reports feeling well. No acute complaints. Denies fever, chills, cp, palpitations, sob, nv, heat/cold intolerance, dizziness, melena, hematochezia, muscle weakness, loss of sensation, bowel/bladder incontinence or calf pain.\par  [FreeTextEntry7] : EKG without ST changes

## 2022-11-25 ENCOUNTER — LABORATORY RESULT (OUTPATIENT)
Age: 42
End: 2022-11-25

## 2022-11-28 ENCOUNTER — APPOINTMENT (OUTPATIENT)
Age: 42
End: 2022-11-28

## 2022-11-28 PROCEDURE — 29881 ARTHRS KNE SRG MNISECTMY M/L: CPT | Mod: AS,LT

## 2022-11-28 PROCEDURE — 29881 ARTHRS KNE SRG MNISECTMY M/L: CPT | Mod: LT

## 2022-11-28 PROCEDURE — 29875 ARTHRS KNEE SURG SYNVCT LMTD: CPT | Mod: AS,59,LT

## 2022-11-28 PROCEDURE — 29875 ARTHRS KNEE SURG SYNVCT LMTD: CPT | Mod: 59,LT

## 2022-11-28 RX ORDER — ASPIRIN/ACETAMINOPHEN/CAFFEINE 500-325-65
325 POWDER IN PACKET (EA) ORAL
Qty: 30 | Refills: 0 | Status: ACTIVE | COMMUNITY
Start: 2022-11-28 | End: 1900-01-01

## 2022-11-30 ENCOUNTER — APPOINTMENT (OUTPATIENT)
Dept: FAMILY MEDICINE | Facility: CLINIC | Age: 42
End: 2022-11-30

## 2022-11-30 VITALS
SYSTOLIC BLOOD PRESSURE: 120 MMHG | HEART RATE: 80 BPM | WEIGHT: 237 LBS | HEIGHT: 63 IN | DIASTOLIC BLOOD PRESSURE: 80 MMHG | TEMPERATURE: 97.6 F | BODY MASS INDEX: 41.99 KG/M2 | OXYGEN SATURATION: 98 %

## 2022-11-30 DIAGNOSIS — Z12.11 ENCOUNTER FOR SCREENING FOR MALIGNANT NEOPLASM OF COLON: ICD-10-CM

## 2022-11-30 PROCEDURE — 99396 PREV VISIT EST AGE 40-64: CPT | Mod: 25

## 2022-11-30 PROCEDURE — G0444 DEPRESSION SCREEN ANNUAL: CPT | Mod: 59

## 2022-11-30 NOTE — PHYSICAL EXAM
[PERRL] : pupils equal round and reactive to light [EOMI] : extraocular movements intact [Normal Outer Ear/Nose] : the outer ears and nose were normal in appearance [Normal Oropharynx] : the oropharynx was normal [Normal TMs] : both tympanic membranes were normal [No Lymphadenopathy] : no lymphadenopathy [Supple] : supple [Normal] : normal rate, regular rhythm, normal S1 and S2 and no murmur heard [No Varicosities] : no varicosities [Pedal Pulses Present] : the pedal pulses are present [No Edema] : there was no peripheral edema [Soft] : abdomen soft [Non Tender] : non-tender [Non-distended] : non-distended [No Rash] : no rash [Normal Gait] : normal gait [Normal Affect] : the affect was normal [Normal Insight/Judgement] : insight and judgment were intact

## 2022-12-02 NOTE — PLAN
[FreeTextEntry1] : BW ordered - will get in 1 month \par Start Ozempic \par encourage physical activity\par continue BP medication\par continue to monitor BP at home \par follow up 3 months

## 2022-12-02 NOTE — HISTORY OF PRESENT ILLNESS
[FreeTextEntry1] : CPE [de-identified] : 41yo F presents for CPE. Pt reports she had meniscus surgery 2 weeks ago but is doing much better today. Pt reports she has been unable to exercise much due to knee pain. Pt breast cancer risk 26%, fam hx of grandmother with breast cancer at 55. Pt reports she is now getting mammograms and MRI every 6 months. Pt reports last colonoscopy was 3 years ago, mother has hx of colorectal cancer, due for colonoscopy this year. Pt last A1C 6.5% and would like to start medication now for DM. Pt reports BP has been under control since adding HCTZ. Pt denies any headaches, weakness, or fainting. \par Pt denies any CP, palpitations, SOB, VARGAS, fevers, chills, abdominal pain, N/V, diarrhea, constipation, BRBPR or dark tarry stools.\par

## 2022-12-02 NOTE — PAST MEDICAL HISTORY
[Menstruating] : menstruating [Normal Amount/Duration] : it was of a normal amount and duration [Normal Duration] : the duration was normal [de-identified] : LMP 11/10

## 2022-12-02 NOTE — HEALTH RISK ASSESSMENT
[Patient reported mammogram was normal] : Patient reported mammogram was normal [Patient reported PAP Smear was abnormal] : Patient reported PAP Smear was abnormal [Good] : ~his/her~  mood as  good [No] : No [1 or 2 (0 pts)] : 1 or 2 (0 points) [Never (0 pts)] : Never (0 points) [0] : 2) Feeling down, depressed, or hopeless: Not at all (0) [PHQ-2 Negative - No further assessment needed] : PHQ-2 Negative - No further assessment needed [I have developed a follow-up plan documented below in the note.] : I have developed a follow-up plan documented below in the note. [None] : None [With Family] : lives with family [Employed] : employed [Significant Other] : lives with significant other [# Of Children ___] : has [unfilled] children [Sexually Active] : sexually active [Feels Safe at Home] : Feels safe at home [Fully functional (bathing, dressing, toileting, transferring, walking, feeding)] : Fully functional (bathing, dressing, toileting, transferring, walking, feeding) [Fully functional (using the telephone, shopping, preparing meals, housekeeping, doing laundry, using] : Fully functional and needs no help or supervision to perform IADLs (using the telephone, shopping, preparing meals, housekeeping, doing laundry, using transportation, managing medications and managing finances) [Audit-CScore] : 0 [[id number]] : 0 [Patient reported colonoscopy was normal] : Patient reported colonoscopy was normal [MammogramDate] : 10/21 [PapSmearDate] : 09//22 [ColonoscopyDate] : 05/19 [ColonoscopyComments] : every 3 years  [FreeTextEntry3] : and step daughter

## 2022-12-07 ENCOUNTER — APPOINTMENT (OUTPATIENT)
Dept: ORTHOPEDIC SURGERY | Facility: CLINIC | Age: 42
End: 2022-12-07

## 2022-12-07 ENCOUNTER — APPOINTMENT (OUTPATIENT)
Dept: MAMMOGRAPHY | Facility: CLINIC | Age: 42
End: 2022-12-07

## 2022-12-07 VITALS — HEIGHT: 63 IN | WEIGHT: 237 LBS | BODY MASS INDEX: 41.99 KG/M2

## 2022-12-07 PROCEDURE — 99024 POSTOP FOLLOW-UP VISIT: CPT

## 2022-12-07 NOTE — HISTORY OF PRESENT ILLNESS
[de-identified] : 12/7/22:  s/p left knee scope on 11/28/22.  doing well.  [FreeTextEntry1] : left knee [de-identified] : PT

## 2022-12-07 NOTE — DISCUSSION/SUMMARY
[de-identified] : Progress Note completed by Carla Garcia PA-C\par * Dr. Espinoza -- The documentation recorded in this note accurately reflects the decisions made by me during this visit.

## 2022-12-07 NOTE — ASSESSMENT
[FreeTextEntry1] : s/p left knee scope for pmm, plm, synovectomy and chondroplasty on 11/28/22.  mild oa present.  doing well.

## 2022-12-16 ENCOUNTER — RX RENEWAL (OUTPATIENT)
Age: 42
End: 2022-12-16

## 2022-12-18 ENCOUNTER — RX RENEWAL (OUTPATIENT)
Age: 42
End: 2022-12-18

## 2023-01-18 ENCOUNTER — APPOINTMENT (OUTPATIENT)
Dept: ORTHOPEDIC SURGERY | Facility: CLINIC | Age: 43
End: 2023-01-18
Payer: COMMERCIAL

## 2023-01-18 VITALS — HEIGHT: 63 IN | BODY MASS INDEX: 41.99 KG/M2 | WEIGHT: 237 LBS

## 2023-01-18 PROCEDURE — 99024 POSTOP FOLLOW-UP VISIT: CPT

## 2023-01-18 NOTE — ASSESSMENT
[FreeTextEntry1] : s/p left knee scope for pmm, plm, synovectomy and chondroplasty on 11/28/22.  mild oa present.  still some pf pain.

## 2023-01-18 NOTE — HISTORY OF PRESENT ILLNESS
[] : Post Surgical Visit: yes [de-identified] : 12/7/22:  s/p left knee scope on 11/28/22.  doing well. \par 1/18/23: still some popping and clicking.  mild pain. [2] : 2 [FreeTextEntry1] : left knee [de-identified] : PT [de-identified] : 11/28/22  [de-identified] : left knee scope

## 2023-01-18 NOTE — PHYSICAL EXAM
[Left] : left knee [NL (0)] : extension 0 degrees [4___] : quadriceps 4[unfilled]/5 [5___] : hamstring 5[unfilled]/5 [] : patient ambulates without assistive device [TWNoteComboBox7] : flexion 115 degrees

## 2023-02-15 ENCOUNTER — APPOINTMENT (OUTPATIENT)
Dept: ORTHOPEDIC SURGERY | Facility: CLINIC | Age: 43
End: 2023-02-15
Payer: COMMERCIAL

## 2023-02-15 VITALS — HEIGHT: 63 IN | BODY MASS INDEX: 41.99 KG/M2 | WEIGHT: 237 LBS

## 2023-02-15 PROCEDURE — 99024 POSTOP FOLLOW-UP VISIT: CPT

## 2023-02-15 NOTE — HISTORY OF PRESENT ILLNESS
[3] : 3 [4] : 4 [Sharp] : sharp [Constant] : constant [Walking/activity] : walking/activity [Sitting] : sitting [de-identified] : 12/7/22:  s/p left knee scope on 11/28/22.  doing well. \par 1/18/23: still some popping and clicking.  mild pain.\par 2/15/23:  follow up left knee.  pain still.  [FreeTextEntry1] : left knee [de-identified] : pt

## 2023-02-15 NOTE — PHYSICAL EXAM
[Left] : left knee [NL (0)] : extension 0 degrees [4___] : quadriceps 4[unfilled]/5 [5___] : hamstring 5[unfilled]/5 [] : no purulent drainage [FreeTextEntry8] : still medial knee pain.  going down medial leg  [TWNoteComboBox7] : flexion 115 degrees

## 2023-02-15 NOTE — DISCUSSION/SUMMARY
[de-identified] : Progress Note completed by Carla Garcia PA-C\par * Dr. Espinoza -- The documentation recorded in this note accurately reflects the decisions made by me during this visit.

## 2023-02-15 NOTE — ASSESSMENT
[FreeTextEntry1] : s/p left knee scope for pmm, plm, synovectomy and chondroplasty on 11/28/22.  mild oa present.  still some pf pain.  some worse medial pain.

## 2023-02-23 RX ORDER — HYLAN G-F 20 16MG/2ML
48 SYRINGE (ML) INTRAARTICULAR
Qty: 1 | Refills: 0 | Status: ACTIVE | COMMUNITY
Start: 2023-02-23 | End: 1900-01-01

## 2023-03-01 ENCOUNTER — APPOINTMENT (OUTPATIENT)
Dept: ORTHOPEDIC SURGERY | Facility: CLINIC | Age: 43
End: 2023-03-01

## 2023-03-03 ENCOUNTER — TRANSCRIPTION ENCOUNTER (OUTPATIENT)
Age: 43
End: 2023-03-03

## 2023-03-06 ENCOUNTER — TRANSCRIPTION ENCOUNTER (OUTPATIENT)
Age: 43
End: 2023-03-06

## 2023-03-06 RX ORDER — LANCETS 28 GAUGE
EACH MISCELLANEOUS
Qty: 1 | Refills: 1 | Status: ACTIVE | COMMUNITY
Start: 2023-03-06 | End: 1900-01-01

## 2023-03-06 RX ORDER — BLOOD SUGAR DIAGNOSTIC
STRIP MISCELLANEOUS DAILY
Qty: 30 | Refills: 5 | Status: ACTIVE | COMMUNITY
Start: 2023-03-06 | End: 1900-01-01

## 2023-03-06 RX ORDER — BLOOD-GLUCOSE METER
W/DEVICE KIT MISCELLANEOUS
Qty: 1 | Refills: 0 | Status: ACTIVE | COMMUNITY
Start: 2023-03-06 | End: 1900-01-01

## 2023-03-08 ENCOUNTER — TRANSCRIPTION ENCOUNTER (OUTPATIENT)
Age: 43
End: 2023-03-08

## 2023-03-09 ENCOUNTER — TRANSCRIPTION ENCOUNTER (OUTPATIENT)
Age: 43
End: 2023-03-09

## 2023-03-10 ENCOUNTER — APPOINTMENT (OUTPATIENT)
Dept: FAMILY MEDICINE | Facility: CLINIC | Age: 43
End: 2023-03-10
Payer: COMMERCIAL

## 2023-03-10 ENCOUNTER — RESULT CHARGE (OUTPATIENT)
Age: 43
End: 2023-03-10

## 2023-03-10 VITALS
HEART RATE: 87 BPM | WEIGHT: 231 LBS | SYSTOLIC BLOOD PRESSURE: 132 MMHG | BODY MASS INDEX: 40.93 KG/M2 | TEMPERATURE: 97.5 F | OXYGEN SATURATION: 97 % | HEIGHT: 63 IN | DIASTOLIC BLOOD PRESSURE: 92 MMHG

## 2023-03-10 DIAGNOSIS — R55 SYNCOPE AND COLLAPSE: ICD-10-CM

## 2023-03-10 DIAGNOSIS — R11.0 NAUSEA: ICD-10-CM

## 2023-03-10 LAB
BILIRUB UR QL STRIP: NORMAL
CLARITY UR: NORMAL
COLLECTION METHOD: NORMAL
GLUCOSE UR-MCNC: NEGATIVE
HCG UR QL: 1 EU/DL
HGB UR QL STRIP.AUTO: NORMAL
KETONES UR-MCNC: 15
LEUKOCYTE ESTERASE UR QL STRIP: NORMAL
NITRITE UR QL STRIP: NEGATIVE
PH UR STRIP: 5.5
PROT UR STRIP-MCNC: NORMAL
SP GR UR STRIP: 1.02

## 2023-03-10 PROCEDURE — 36415 COLL VENOUS BLD VENIPUNCTURE: CPT

## 2023-03-10 PROCEDURE — 81003 URINALYSIS AUTO W/O SCOPE: CPT | Mod: QW

## 2023-03-10 PROCEDURE — 99214 OFFICE O/P EST MOD 30 MIN: CPT | Mod: 25

## 2023-03-10 RX ORDER — ONDANSETRON 4 MG/1
4 TABLET, ORALLY DISINTEGRATING ORAL 3 TIMES DAILY
Qty: 30 | Refills: 1 | Status: ACTIVE | COMMUNITY
Start: 2023-03-10 | End: 1900-01-01

## 2023-03-12 ENCOUNTER — APPOINTMENT (OUTPATIENT)
Age: 43
End: 2023-03-12

## 2023-03-12 LAB
ALBUMIN SERPL ELPH-MCNC: 4.4 G/DL
ALP BLD-CCNC: 123 U/L
ALT SERPL-CCNC: 16 U/L
ANION GAP SERPL CALC-SCNC: 15 MMOL/L
APPEARANCE: ABNORMAL
AST SERPL-CCNC: 20 U/L
BACTERIA UR CULT: NORMAL
BACTERIA: ABNORMAL
BASOPHILS # BLD AUTO: 0.05 K/UL
BASOPHILS NFR BLD AUTO: 0.7 %
BILIRUB SERPL-MCNC: 0.3 MG/DL
BILIRUBIN URINE: ABNORMAL
BLOOD URINE: ABNORMAL
BUN SERPL-MCNC: 11 MG/DL
CALCIUM SERPL-MCNC: 9.7 MG/DL
CHLORIDE SERPL-SCNC: 92 MMOL/L
CHOLEST SERPL-MCNC: 155 MG/DL
CO2 SERPL-SCNC: 28 MMOL/L
COLOR: YELLOW
CREAT SERPL-MCNC: 0.56 MG/DL
EGFR: 116 ML/MIN/1.73M2
EOSINOPHIL # BLD AUTO: 0.09 K/UL
EOSINOPHIL NFR BLD AUTO: 1.2 %
ESTIMATED AVERAGE GLUCOSE: 137 MG/DL
GLUCOSE QUALITATIVE U: NEGATIVE
GLUCOSE SERPL-MCNC: 153 MG/DL
HBA1C MFR BLD HPLC: 6.4 %
HCT VFR BLD CALC: 40.1 %
HDLC SERPL-MCNC: 70 MG/DL
HGB BLD-MCNC: 12.6 G/DL
HYALINE CASTS: 0 /LPF
IMM GRANULOCYTES NFR BLD AUTO: 0.1 %
KETONES URINE: ABNORMAL
LDLC SERPL CALC-MCNC: 65 MG/DL
LEUKOCYTE ESTERASE URINE: ABNORMAL
LYMPHOCYTES # BLD AUTO: 1.48 K/UL
LYMPHOCYTES NFR BLD AUTO: 20 %
MAN DIFF?: NORMAL
MCHC RBC-ENTMCNC: 25.5 PG
MCHC RBC-ENTMCNC: 31.4 GM/DL
MCV RBC AUTO: 81.2 FL
MICROSCOPIC-UA: NORMAL
MONOCYTES # BLD AUTO: 0.66 K/UL
MONOCYTES NFR BLD AUTO: 8.9 %
NEUTROPHILS # BLD AUTO: 5.1 K/UL
NEUTROPHILS NFR BLD AUTO: 69.1 %
NITRITE URINE: NEGATIVE
NONHDLC SERPL-MCNC: 85 MG/DL
PH URINE: 6.5
PLATELET # BLD AUTO: 487 K/UL
POTASSIUM SERPL-SCNC: 3.8 MMOL/L
PROT SERPL-MCNC: 7.3 G/DL
PROTEIN URINE: ABNORMAL
RBC # BLD: 4.94 M/UL
RBC # FLD: 13.3 %
RED BLOOD CELLS URINE: 15 /HPF
SODIUM SERPL-SCNC: 135 MMOL/L
SPECIFIC GRAVITY URINE: >=1.03
SQUAMOUS EPITHELIAL CELLS: >27 /HPF
TRIGL SERPL-MCNC: 99 MG/DL
UROBILINOGEN URINE: NORMAL
WBC # FLD AUTO: 7.39 K/UL
WHITE BLOOD CELLS URINE: 30 /HPF

## 2023-03-12 NOTE — PLAN
[FreeTextEntry1] : BW drawn today\par Start Bactrim \par Start Zofran PRN for nausea \par POCT in office today + blood and leuks\par UA/UC ordered \par Encourage increase hydration, frequent urination, if fevers/chills develop -go to ED\par Pt has appt with nephrologist in 2 weeks\par continue ozempic, education provided on hypoglycemia\par follow up 3 months or sooner if needed

## 2023-03-12 NOTE — PHYSICAL EXAM
[Normal] : normal rate, regular rhythm, normal S1 and S2 and no murmur heard [No CVA Tenderness] : no CVA  tenderness [Normal Gait] : normal gait [Normal Affect] : the affect was normal [Normal Insight/Judgement] : insight and judgment were intact

## 2023-03-12 NOTE — HISTORY OF PRESENT ILLNESS
[FreeTextEntry8] : 43yo F presents with cloudy urine over the past 3 days and labile blood pressure. Pt reports she had elevated BP and reports she was concerned it was due to starting the ozempic. Upon checking her medication patient realized she was not taking HCTZ. After restarting HCTZ, while patient was at a cheerleading event for her daughter she had a syncopal episode and reports her BP was very low. Pt was evaluated by EMTs but denies going to hospital. Pt reports she did not faint or hit her head or have any LOC. Pt reports she believed she had low blood sugar due to ozempic. Pt reports she regularly has been checking her BP at home and states it is typically 160/100. Pt is taking medication as directed. Today in office BP is within a normal range. Pt reports no headaches, one sided weakness, blurry vision, or alterations in mental status. \par Pt reports no dysuria, hematuria, frequency or urgency. Pt reports she is concerned about having a UTI. Pt denies any fevers, chills or flank pain. \par Pt also reporting some nausea, no vomiting due to ozempic.

## 2023-03-13 ENCOUNTER — TRANSCRIPTION ENCOUNTER (OUTPATIENT)
Age: 43
End: 2023-03-13

## 2023-03-21 RX ORDER — PEN NEEDLE, DIABETIC 32 GX 1/4"
32G X 6 MM NEEDLE, DISPOSABLE MISCELLANEOUS
Qty: 1 | Refills: 0 | Status: ACTIVE | COMMUNITY
Start: 2023-03-17 | End: 1900-01-01

## 2023-03-23 ENCOUNTER — APPOINTMENT (OUTPATIENT)
Dept: NEPHROLOGY | Facility: CLINIC | Age: 43
End: 2023-03-23
Payer: COMMERCIAL

## 2023-03-23 VITALS
TEMPERATURE: 96.8 F | HEIGHT: 63 IN | SYSTOLIC BLOOD PRESSURE: 150 MMHG | OXYGEN SATURATION: 99 % | DIASTOLIC BLOOD PRESSURE: 95 MMHG | HEART RATE: 83 BPM | BODY MASS INDEX: 41.11 KG/M2 | WEIGHT: 232 LBS

## 2023-03-23 PROCEDURE — 99204 OFFICE O/P NEW MOD 45 MIN: CPT | Mod: 25

## 2023-03-23 PROCEDURE — 36415 COLL VENOUS BLD VENIPUNCTURE: CPT

## 2023-03-29 ENCOUNTER — APPOINTMENT (OUTPATIENT)
Dept: FAMILY MEDICINE | Facility: CLINIC | Age: 43
End: 2023-03-29
Payer: COMMERCIAL

## 2023-03-29 ENCOUNTER — APPOINTMENT (OUTPATIENT)
Dept: ORTHOPEDIC SURGERY | Facility: CLINIC | Age: 43
End: 2023-03-29
Payer: COMMERCIAL

## 2023-03-29 VITALS — WEIGHT: 232 LBS | BODY MASS INDEX: 41.11 KG/M2 | HEIGHT: 63 IN

## 2023-03-29 PROCEDURE — 99214 OFFICE O/P EST MOD 30 MIN: CPT | Mod: 25

## 2023-03-29 PROCEDURE — 20611 DRAIN/INJ JOINT/BURSA W/US: CPT | Mod: LT

## 2023-03-29 PROCEDURE — ZZZZZ: CPT

## 2023-03-29 NOTE — DISCUSSION/SUMMARY
[de-identified] : Progress Note completed by Dulce Larson PA-C\par * Dr. Espinoza -- The documentation recorded in this note accurately reflects the decisions made by me during this visit.

## 2023-03-29 NOTE — PROCEDURE
[Large Joint Injection] : Large joint injection [Left] : of the left [Knee] : knee [Synvisc-one (48mg)] : 48mg of Synvisc-one [] : Patient tolerated procedure well [Call if redness, pain or fever occur] : call if redness, pain or fever occur [Apply ice for 15min out of every hour for the next 12-24 hours as tolerated] : apply ice for 15 minutes out of every hour for the next 12-24 hours as tolerated [Patient was advised to rest the joint(s) for ____ days] : patient was advised to rest the joint(s) for [unfilled] days [Previous OTC use and PT nontherapeutic] : patient has tried OTC's including aspirin, Ibuprofen, Aleve, etc or prescription NSAIDS, and/or exercises at home and/or physical therapy without satisfactory response [Risks, benefits, alternatives discussed / Verbal consent obtained] : the risks benefits, and alternatives have been discussed, and verbal consent was obtained [Prior failure or difficult injection] : prior failure or difficult injection [All ultrasound images have been permanently captured and stored accordingly in our picture archiving and communication system] : All ultrasound images have been permanently captured and stored accordingly in our picture archiving and communication system [Visualization of the needle and placement of injection was performed without complication] : visualization of the needle and placement of injection was performed without complication

## 2023-03-29 NOTE — HISTORY OF PRESENT ILLNESS
[4] : 4 [3] : 3 [Dull/Aching] : dull/aching [Occasional] : occasional [Meds] : meds [Ice] : ice [1] : 1 [Synvisc One] : Synvisc One [de-identified] : 12/7/22:  s/p left knee scope on 11/28/22.  doing well. \par 1/18/23: still some popping and clicking.  mild pain.\par 2/15/23:  follow up left knee.  pain still. \par 3/29/23: Here for SynviscOne L knee  [] : This patient has had an injection before: no [FreeTextEntry1] : left knee [de-identified] : left knee [de-identified] : synvisc one

## 2023-04-03 ENCOUNTER — APPOINTMENT (OUTPATIENT)
Age: 43
End: 2023-04-03

## 2023-04-03 ENCOUNTER — TRANSCRIPTION ENCOUNTER (OUTPATIENT)
Age: 43
End: 2023-04-03

## 2023-04-03 LAB
APPEARANCE: ABNORMAL
BACTERIA UR CULT: NORMAL
BACTERIA: ABNORMAL
BILIRUBIN URINE: NEGATIVE
BLOOD URINE: ABNORMAL
COLOR: YELLOW
GLUCOSE QUALITATIVE U: NEGATIVE
HYALINE CASTS: 1 /LPF
KETONES URINE: NEGATIVE
LEUKOCYTE ESTERASE URINE: ABNORMAL
MICROSCOPIC-UA: NORMAL
NITRITE URINE: NEGATIVE
PH URINE: 6.5
PROTEIN URINE: ABNORMAL
RED BLOOD CELLS URINE: 1 /HPF
SPECIFIC GRAVITY URINE: 1.02
SQUAMOUS EPITHELIAL CELLS: >27 /HPF
URINE COMMENTS: NORMAL
UROBILINOGEN URINE: NORMAL
WHITE BLOOD CELLS URINE: 73 /HPF

## 2023-04-06 ENCOUNTER — APPOINTMENT (OUTPATIENT)
Dept: PSYCHIATRY | Facility: CLINIC | Age: 43
End: 2023-04-06
Payer: COMMERCIAL

## 2023-04-06 PROCEDURE — 90791 PSYCH DIAGNOSTIC EVALUATION: CPT

## 2023-04-07 NOTE — FAMILY HISTORY
[FreeTextEntry1] : Patient reports her mother suffers from extreme anxiety and does not leave the house often. No family hx of inpatient treatment. No family hx of death by suicide.

## 2023-04-07 NOTE — HISTORY OF PRESENT ILLNESS
[FreeTextEntry1] : Patient reports symptoms of depression and anxiety including feeling down, trouble sleeping, overeating, feeling bad about self, trouble concentrating, feeling anxious/on edge, not being able to control worry, worrying about many different things. PHQ-9 score of 20 and VICTOR M-7 score of 15. Patient reports these symptoms make it somewhat difficult to take care of responsibilities.  [FreeTextEntry2] : No hx of inpatient treatment. Patient has been in and out of therapy over the course of her life.  [FreeTextEntry3] : Patient is currently prescribed effexor.

## 2023-04-07 NOTE — PLAN
[Admit to Program     (Add Program Admission information to a new column in the Admit/Discharge Flowsheet)] : Admit to program [Every ___ week(s)] : Psychotherapy: Every [unfilled] week(s) [Individual Therapy] : Individual Therapy [FreeTextEntry4] : Patient would benefit from individual psychotherapy once a week to address mood symptoms. \par \par Emergency procedures were discussed. Patient educated to call National Suicide Prevention Hotline at 988, call 911 or head to the nearest emergency room in the event of suicidal ideation/intent/plan or homicidal ideation/intent/plan.

## 2023-04-07 NOTE — PSYCHOSOCIAL ASSESSMENT
[None known] : None known [Yes (add details)] : Patient attended school, home tutoring, or received education instruction at anytime in the past three months? Yes [Yes (select details below)] : Have you ever experienced this type of event? Yes [had nightmares about the event(s) or thought about the event(s) when you did not want] : had nightmares and/or unwanted thoughts about the events [tried hard not to think about the event(s) or went out of your way to avoid situations that reminded you of the event] : tried hard to avoid thinking about events or avoid situations that reminded patient of the event [has been constantly on guard, watchful, or easily startled] : has been constantly on guard, watchful, or easily startled [felt numb or detached from people, activities, or your surrounding] : has felt numb or detached from people, activities, or surroundings [felt guilty or unable to stop blaming yourself or others for the event(s) or any problems the event(s) may have caused] : has felt guilty or unable to stop blaming self or others for event(s), or any problems the event(s) may have caused [Competitive and integrated employment] : Competitive and integrated employment [35 hours or more] : 35 hours or more [Financially stable] : financially stable [None] : none [Private residence (home, apartment, rooming house, hotel, motel, supported housing, supported Single Room Occupancy (SRO),] : Private residence (home, apartment, rooming house, hotel, motel, supported housing, supported Single Room Occupancy (SRO), permanent housing programs, transient housing programs, and shelter plus care housing) [Client's child, stepchild, foster child, grandchild] : client's child, stepchild, foster child, grandchild [Yes] : yes [N/A] : n/a [No] : Patient has personal representation (legal guardian, representative payee, conservatorship)? No [FreeTextEntry1] : Patient is attending college to get her bachelors in Healthcare Administration
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
## 2023-04-07 NOTE — REASON FOR VISIT
[Number can be texted] : number can be texted [OK  to leave message] : OK  to leave message [Self-Referred] : Self-Referred [Patient] : Patient [FreeTextEntry3] : [email or web address] [FreeTextEntry5] : English [FreeTextEntry6] : [place] [FreeTextEntry7] : She/Her [FreeTextEntry1] : RODDY is a 42 year female presenting today for a psychotherapy evaluation.\par \par

## 2023-04-07 NOTE — DISCUSSION/SUMMARY
[Low acute suicide risk] : Low acute suicide risk [No] : No [Not clinically indicated] : Safety Plan completed/updated (for individuals at risk): Not clinically indicated [FreeTextEntry1] : Patient reports feelings of being better off dead. She denies suicidal ideation/intent/plan. Patient denies HIIP. Patient denies AVH.
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
## 2023-04-08 ENCOUNTER — TRANSCRIPTION ENCOUNTER (OUTPATIENT)
Age: 43
End: 2023-04-08

## 2023-04-08 ENCOUNTER — NON-APPOINTMENT (OUTPATIENT)
Age: 43
End: 2023-04-08

## 2023-04-09 NOTE — HISTORY OF PRESENT ILLNESS
[FreeTextEntry1] : HPI: Patient is a 42 year old female with HTN, HLD, DM, morbid obesity here for initial visit for discussion of labs and evaluation of renal function. \par \par -H/o chr NSAID use +, L knee OA\par -H/o HTN \par Denies dysuria, gross hematuria, SOB, CP, cough, expectoration, fever, chills, palpitation, syncope, urgency, hesitancy, swelling on feet, joint pain. No history of nephrolithiasis or renal diseases in the family. \par \par REVIEW OF SYSTEM:  All systems were reviewed in detail.  Pertinent positive and negatives have been mentioned in history of present illness.  The rest are negative.

## 2023-04-09 NOTE — ASSESSMENT
[FreeTextEntry1] : 42 year old female with HTN, HLD, DM, morbid obesity here for initial visit for discussion of labs and evaluation of renal function. \par \par -H/o chr NSAID use +, L knee OA\par -H/o HTN \par -Scr WNL\par \par Will check labs for secondary hypertension w/u\par Check renal US Doppler\par \par -Discussed goal HTN < 140/90, LDL <100, A1c <7.\par #. HTN: controlled on current medications, continue amlodipine, HCTZ\par #. Hyperlipidemia: controlled on current medications.\par #. DM: controlled on current medications.\par \par \par

## 2023-04-10 ENCOUNTER — APPOINTMENT (OUTPATIENT)
Dept: ULTRASOUND IMAGING | Facility: CLINIC | Age: 43
End: 2023-04-10

## 2023-04-10 ENCOUNTER — APPOINTMENT (OUTPATIENT)
Dept: ORTHOPEDIC SURGERY | Facility: CLINIC | Age: 43
End: 2023-04-10
Payer: COMMERCIAL

## 2023-04-10 VITALS — HEIGHT: 63 IN | WEIGHT: 232 LBS | BODY MASS INDEX: 41.11 KG/M2

## 2023-04-10 PROCEDURE — L3908: CPT | Mod: RT

## 2023-04-10 PROCEDURE — 99213 OFFICE O/P EST LOW 20 MIN: CPT

## 2023-04-10 NOTE — DISCUSSION/SUMMARY
[de-identified] : Discussed the nature of the diagnosis and risk and benefits of different modalities of treatment.\par She will night time splint for CTS. \par She has no pain associated with the ganglion, no intervention is necessary at this time.\par RTO 3 weeks.

## 2023-04-10 NOTE — HISTORY OF PRESENT ILLNESS
[Gradual] : gradual [4] : 4 [Dull/Aching] : dull/aching [Sharp] : sharp [Occasional] : occasional [Meds] : meds [de-identified] : 42 year old female presenting with RT wrist volar radial swelling for the past few weeks. No injury/trauma.  [FreeTextEntry1] : rt wrist [FreeTextEntry3] : 3 weeks ago

## 2023-04-10 NOTE — PHYSICAL EXAM
[Right] : right hand [Volar Wrist] : volar wrist [Anatomic Snuff Box] : anatomic snuff box [de-identified] : R hand: \par Tender volar wrist \par Good finger ROM \par +Tinels \par +Phalens \par +Compression test \par \par  [FreeTextEntry3] : 0.5 cm mass volar radial wrist, firm and mobile, positive transillumination

## 2023-04-13 ENCOUNTER — NON-APPOINTMENT (OUTPATIENT)
Age: 43
End: 2023-04-13

## 2023-04-13 ENCOUNTER — APPOINTMENT (OUTPATIENT)
Dept: PSYCHIATRY | Facility: CLINIC | Age: 43
End: 2023-04-13

## 2023-04-20 ENCOUNTER — APPOINTMENT (OUTPATIENT)
Dept: PSYCHIATRY | Facility: CLINIC | Age: 43
End: 2023-04-20
Payer: COMMERCIAL

## 2023-04-20 ENCOUNTER — TRANSCRIPTION ENCOUNTER (OUTPATIENT)
Age: 43
End: 2023-04-20

## 2023-04-20 PROCEDURE — 90837 PSYTX W PT 60 MINUTES: CPT

## 2023-04-20 NOTE — REASON FOR VISIT
[Patient] : Patient [FreeTextEntry1] : GEORGIA is a 42 year female presenting today for a follow up psychotherapy session.\par
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
## 2023-04-25 ENCOUNTER — APPOINTMENT (OUTPATIENT)
Dept: PSYCHIATRY | Facility: CLINIC | Age: 43
End: 2023-04-25
Payer: COMMERCIAL

## 2023-04-25 VITALS
OXYGEN SATURATION: 98 % | SYSTOLIC BLOOD PRESSURE: 155 MMHG | WEIGHT: 221 LBS | HEART RATE: 83 BPM | BODY MASS INDEX: 39.16 KG/M2 | HEIGHT: 63 IN | DIASTOLIC BLOOD PRESSURE: 102 MMHG

## 2023-04-25 DIAGNOSIS — F41.8 OTHER SPECIFIED ANXIETY DISORDERS: ICD-10-CM

## 2023-04-25 PROCEDURE — 99215 OFFICE O/P EST HI 40 MIN: CPT

## 2023-04-25 RX ORDER — CIPROFLOXACIN HYDROCHLORIDE 250 MG/1
250 TABLET, FILM COATED ORAL
Qty: 14 | Refills: 0 | Status: DISCONTINUED | COMMUNITY
Start: 2023-04-03 | End: 2023-04-25

## 2023-04-25 RX ORDER — SULFAMETHOXAZOLE AND TRIMETHOPRIM 800; 160 MG/1; MG/1
800-160 TABLET ORAL TWICE DAILY
Qty: 10 | Refills: 0 | Status: DISCONTINUED | COMMUNITY
Start: 2023-03-10 | End: 2023-04-25

## 2023-04-25 RX ORDER — OXYCODONE AND ACETAMINOPHEN 5; 325 MG/1; MG/1
5-325 TABLET ORAL
Qty: 30 | Refills: 0 | Status: DISCONTINUED | COMMUNITY
Start: 2022-11-28 | End: 2023-04-25

## 2023-04-25 NOTE — REASON FOR VISIT
[Number can be texted] : number can be texted [OK  to leave message] : OK  to leave message [Patient] : Patient [Primary Care] : Primary Care [FreeTextEntry3] : [email or web address] [FreeTextEntry5] : English [FreeTextEntry6] : [place] [FreeTextEntry7] : She/Her [FreeTextEntry2] : PCP would like her to be seen [FreeTextEntry1] : RODDY is a 42 year female presenting today for a psychotherapy evaluation.\par \par

## 2023-04-25 NOTE — PAST MEDICAL HISTORY
[FreeTextEntry1] : CVA 2014, 2016 - No known cause, Cerebral angioplasty 2016\par No hx of Seizure, no hx of head trauma\par HTN, HLD, DM Type II, Left knee meniscal tear, Cholecystomy, Chronic Migraines\par \par

## 2023-04-25 NOTE — PHYSICAL EXAM
[Well groomed] : well groomed [Cooperative] : cooperative [Euthymic] : euthymic [Clear] : clear [Linear/Goal Directed] : linear/goal directed [None Reported] : none reported [Average] : average [WNL] : within normal limits [None] : none [Anxious] : anxious
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
## 2023-04-25 NOTE — EDUCATION
[Medication education provided] : Medication education provided [Rationale for medication choices, possible risks/precautions, benefits, alternative treatment choices, and consequences of] : Rationale for medication choices, possible risks/precautions, benefits, alternative treatment choices, and consequences of non-treatment discussed with patient/family/caregiver [FreeTextEntry1] : The major risks were reviewed, including FDA safety alert of hemophagocytic lymphohistiocytosis (HLH), a rare but serious immune system reaction. Risk of SJS, hepatic problems, and other potential side effects discussed. Pt educated on titration scheduled and to take the medication as prescribed. Pt educated that if misses greater than 5 consecutive doses this medication will need to be re-titrated, starting back at 25 mg qd x 14 days and brought up as previously ordered until dose is re achieved.  If any rash is noted, the patient is instructed to immediately stop taking this medication and be assessed by their PCP.  If the rash is severe they are to present immediately to the emergency room.  They are not to delay treatment of rash [FreeTextEntry2] : BZD side effects: Discussed with patient adverse effects of longer term/frequent use of BZD, potential to develop tolerance to the dose effects and develop dependence, and potential for addiction. Advise patient not to drive or operate heavy machinery immediately after taking the medication. Also educated patient about safe use/and keeping meds safely, and to not share medications with family/friend
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
## 2023-04-27 ENCOUNTER — APPOINTMENT (OUTPATIENT)
Dept: PSYCHIATRY | Facility: CLINIC | Age: 43
End: 2023-04-27
Payer: COMMERCIAL

## 2023-04-27 PROCEDURE — 90837 PSYTX W PT 60 MINUTES: CPT | Mod: 95

## 2023-04-27 NOTE — END OF VISIT
[Duration of Psychotherapy Visit (minutes spent in synchronous communication): ____] : Duration of Psychotherapy Visit (minutes spent in synchronous communication): [unfilled] [Individual Psychotherapy for 53+ minutes] : Individual Psychotherapy for 53+ minutes  [Teletherapy Service Provided] : The services provided in this session were delivered via tele-therapy [Licensed Clinician] : Licensed Clinician [FreeTextEntry3] : [place], [place] [FreeTextEntry5] : [facility] [address], [address], [place] [address]
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
## 2023-04-27 NOTE — REASON FOR VISIT
[Patient preference] : as per patient preference [Telehealth (audio & video) - Individual/Group] : This visit was provided via telehealth using real-time 2-way audio visual technology. [Medical Office: ([facility])___] : The provider was located at the medical office in [unfilled]. [Home] : The patient, [unfilled], was located at home, [unfilled], at the time of the visit. [Verbal consent obtained from patient/other participant(s)] : Verbal consent for telehealth/telephonic services obtained from patient/other participant(s) [Patient] : Patient [FreeTextEntry4] : 2:00 pm [FreeTextEntry5] : 2:59 pm [FreeTextEntry1] : GE[place]IA is a 42 year female presenting today for a follow up psychotherapy session.\par
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
## 2023-05-01 ENCOUNTER — APPOINTMENT (OUTPATIENT)
Dept: ORTHOPEDIC SURGERY | Facility: CLINIC | Age: 43
End: 2023-05-01
Payer: COMMERCIAL

## 2023-05-01 VITALS — HEIGHT: 63 IN | WEIGHT: 221 LBS | BODY MASS INDEX: 39.16 KG/M2

## 2023-05-01 PROCEDURE — 99213 OFFICE O/P EST LOW 20 MIN: CPT

## 2023-05-01 NOTE — DISCUSSION/SUMMARY
[de-identified] : Discussed the nature of the diagnosis and risk and benefits of different modalities of treatment.\par She will continue to splint at night for 2 weeks and then d/c.\par RTO 4 weeks.

## 2023-05-03 ENCOUNTER — APPOINTMENT (OUTPATIENT)
Dept: PSYCHIATRY | Facility: CLINIC | Age: 43
End: 2023-05-03
Payer: COMMERCIAL

## 2023-05-03 PROCEDURE — 90837 PSYTX W PT 60 MINUTES: CPT | Mod: 95
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
## 2023-05-03 NOTE — END OF VISIT
[Duration of Psychotherapy Visit (minutes spent in synchronous communication): ____] : Duration of Psychotherapy Visit (minutes spent in synchronous communication): [unfilled] [Individual Psychotherapy for 53+ minutes] : Individual Psychotherapy for 53+ minutes  [Teletherapy Service Provided] : The services provided in this session were delivered via tele-therapy [Licensed Clinician] : Licensed Clinician [FreeTextEntry3] : [place], [place] [FreeTextEntry5] : [facility] [address], [address], [place] [address]

## 2023-05-03 NOTE — REASON FOR VISIT
[Patient preference] : as per patient preference [Telehealth (audio & video) - Individual/Group] : This visit was provided via telehealth using real-time 2-way audio visual technology. [Medical Office: ([facility])___] : The provider was located at the medical office in [unfilled]. [Home] : The patient, [unfilled], was located at home, [unfilled], at the time of the visit. [Verbal consent obtained from patient/other participant(s)] : Verbal consent for telehealth/telephonic services obtained from patient/other participant(s) [Patient] : Patient [FreeTextEntry4] : 10:58 am [FreeTextEntry5] : 12:00 pm [FreeTextEntry1] : GEORGIA is a 42 year female presenting today for a follow up psychotherapy session.\par
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
## 2023-05-08 ENCOUNTER — TRANSCRIPTION ENCOUNTER (OUTPATIENT)
Age: 43
End: 2023-05-08

## 2023-05-09 ENCOUNTER — APPOINTMENT (OUTPATIENT)
Dept: PSYCHIATRY | Facility: CLINIC | Age: 43
End: 2023-05-09
Payer: COMMERCIAL

## 2023-05-09 PROCEDURE — 99214 OFFICE O/P EST MOD 30 MIN: CPT | Mod: 95

## 2023-05-09 NOTE — REASON FOR VISIT
[Patient preference] : as per patient preference [Telehealth (audio & video) - Individual/Group] : This visit was provided via telehealth using real-time 2-way audio visual technology. [Medical Office: ([facility])___] : The provider was located at the medical office in [unfilled]. [Home] : The patient, [unfilled], was located at home, [unfilled], at the time of the visit. [Number can be texted] : number can be texted [OK  to leave message] : OK  to leave message [Primary Care] : Primary Care [Patient] : Patient [FreeTextEntry4] : 5408 [FreeTextEntry3] : [email or web address] [FreeTextEntry5] : English [FreeTextEntry6] : [place] [FreeTextEntry7] : She/Her [FreeTextEntry1] : "I like the new medication, I definitely see the difference."\par

## 2023-05-09 NOTE — SOCIAL HISTORY
[FreeTextEntry1] : Residential hx: Patient state grew up in [place] until 4 years old, father  at 4 years old. Then mother and Brothers (Marvin 48 years old, Ed 38 years old) moved to [place].\par \par Childhood: "Good" - Took care of younger brother, 14 years old became financially responsible for the household and cared for younger brothers\par :\par Education: Public school\par \par Grades: Patient states grades were  average, graduated HS on time \par \par College: Attended (s) [employer] for BA in Healthcare administration\par \par Current Living Situation: Resides in [place] with 10 year daughter and boyfriend (Gonzalo)\par \par Relationship status: \par \par Romantic relationship: Yes\par \par Mother:    68 YO/ disabled\par \par Father:	 from [place] 54 YO\par \par Relationship with Parents: "Good." \par \par Relationship with siblings: Fair\par \par Work hx: FT [employer] as , works remotely from home\par \par Legal hx: Patient denies recent or remote hx of legal problems. \par \par Access to firearms: Patient denies \par \par Contraceptive use: IUD\par \par LMP: 23\par \par Dietary considerations:\par Patient is . She lives in a private residence with her 10 year old daughter and her boyfriend of 1.5 years. Patient's ex- does not maintain contact with patient or their daughter. Patient has a mother and younger brother who are involved and live nearby. Patient's father passed away when she was 3 years old. She also has an older brother whom lives out of state and does not maintain contact. Patient states she has a network of supportive friends. She works full time for [employer].

## 2023-05-09 NOTE — PHYSICAL EXAM
[Well groomed] : well groomed [Cooperative] : cooperative [Euthymic] : euthymic [Full] : full [Clear] : clear [Linear/Goal Directed] : linear/goal directed [None] : none [None Reported] : none reported [Average] : average [WNL] : within normal limits [FreeTextEntry2] : unwitnessed [FreeTextEntry3] : unwitnessed
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
## 2023-05-09 NOTE — PLAN
[Admit to Program     (Add Program Admission information to a new column in the Admit/Discharge Flowsheet)] : Admit to program [Every ___ week(s)] : Psychotherapy: Every [unfilled] week(s) [Individual Therapy] : Individual Therapy [Yes. details: ___] : Yes, [unfilled] [Medication education provided] : Medication education provided. [Rationale for medication choices, possible risks/precautions, benefits, alternative treatment choices, and consequences of non-treatment discussed] : Rationale for medication choices, possible risks/precautions, benefits, alternative treatment choices, and consequences of non-treatment discussed with patient/family/caregiver  [FreeTextEntry4] : Psychoeducation and supportive therapy provided, and discussed rationale for the recommended medications\par Sleep hygiene education provided\par Medication: c/w Lamotrigine 25mg once daily, c/w Venlafaxine, c/w Alprazolam PRN \par \par Educated patient of importance of remaining abstinent from drugs and alcohol, risks of short-term and long-term use, hazards of combining with medications\par Emergency procedures were discussed: Pt educated to call [phone], 911 or go to the nearest ER for worsening symptoms/suicidal/homicidal ideations\par Continue individual psychotherapy to ongoing therapeutic benefits/learn coping skills\par RTC in  1 month or earlier as needed, please call the office to schedule follow up [phone]\par Patient give the opportunity to ask questions and review treatment; patient expressed understanding and agreement with the above plan.\par \par \par I spent a total of 20 minutes for today's visit in evaluating and treating the patient as per above, including: preparing for patient's appointment (review of prior documents, [facility] ), performing a medically necessary exam/evaluation, communicating/counseling/educating the patient ordering medications, documenting clinical information in the EHR\par

## 2023-05-10 ENCOUNTER — TRANSCRIPTION ENCOUNTER (OUTPATIENT)
Age: 43
End: 2023-05-10

## 2023-05-10 ENCOUNTER — APPOINTMENT (OUTPATIENT)
Dept: ORTHOPEDIC SURGERY | Facility: CLINIC | Age: 43
End: 2023-05-10
Payer: COMMERCIAL

## 2023-05-10 ENCOUNTER — APPOINTMENT (OUTPATIENT)
Dept: PSYCHIATRY | Facility: CLINIC | Age: 43
End: 2023-05-10
Payer: COMMERCIAL

## 2023-05-10 VITALS — BODY MASS INDEX: 39.16 KG/M2 | HEIGHT: 63 IN | WEIGHT: 221 LBS

## 2023-05-10 DIAGNOSIS — M17.12 UNILATERAL PRIMARY OSTEOARTHRITIS, LEFT KNEE: ICD-10-CM

## 2023-05-10 DIAGNOSIS — S83.242D OTHER TEAR OF MEDIAL MENISCUS, CURRENT INJURY, LEFT KNEE, SUBSEQUENT ENCOUNTER: ICD-10-CM

## 2023-05-10 PROCEDURE — 99213 OFFICE O/P EST LOW 20 MIN: CPT

## 2023-05-10 PROCEDURE — 90837 PSYTX W PT 60 MINUTES: CPT | Mod: 95

## 2023-05-10 NOTE — ASSESSMENT
[FreeTextEntry1] : s/p left knee scope for pmm, plm, synovectomy and chondroplasty on 11/28/22.  mild oa medial compartment and anterior compartment present.  still some pf pain.  some worse medial pain.
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
## 2023-05-10 NOTE — REASON FOR VISIT
[Patient preference] : as per patient preference [Telehealth (audio & video) - Individual/Group] : This visit was provided via telehealth using real-time 2-way audio visual technology. [Other Location: e.g. Home (Enter Location, City,State)___] : The provider was located at [unfilled]. [Home] : The patient, [unfilled], was located at home, [unfilled], at the time of the visit. [Verbal consent obtained from patient/other participant(s)] : Verbal consent for telehealth/telephonic services obtained from patient/other participant(s) [Patient] : Patient [FreeTextEntry4] : 10:07 am [FreeTextEntry5] : 11:03 pm [FreeTextEntry1] : GEORGIA is a 42 year female presenting today for a follow up psychotherapy session.\par

## 2023-05-10 NOTE — HISTORY OF PRESENT ILLNESS
[Radiating] : radiating [Tingling] : tingling [Nothing helps with pain getting better] : Nothing helps with pain getting better [Full time] : Work status: full time [de-identified] : 12/7/22:  s/p left knee scope on 11/28/22.  doing well. \par 1/18/23: still some popping and clicking.  mild pain.\par 2/15/23:  follow up left knee.  pain still. \par 3/29/23: Here for SynviscOne L knee \par 5/10/23:  improved but still pain. [4] : 4 [3] : 3 [Dull/Aching] : dull/aching [Occasional] : occasional [Meds] : meds [Ice] : ice [] : This patient has had an injection before: no [1] : 1 [Synvisc One] : Synvisc One [FreeTextEntry1] : left knee [FreeTextEntry6] : numbing  [FreeTextEntry7] : down the left leg [de-identified] : none, synvisc one injection 3/29/23 which gave mild relief  [de-identified] : left knee [de-identified] : synvisc one

## 2023-05-10 NOTE — END OF VISIT
[Duration of Psychotherapy Visit (minutes spent in synchronous communication): ____] : Duration of Psychotherapy Visit (minutes spent in synchronous communication): [unfilled] [Individual Psychotherapy for 53+ minutes] : Individual Psychotherapy for 53+ minutes  [Teletherapy Service Provided] : The services provided in this session were delivered via tele-therapy [Licensed Clinician] : Licensed Clinician [FreeTextEntry3] : [place], [place] [FreeTextEntry5] : [facility] [place] [address], [address], [place] [address]

## 2023-05-10 NOTE — PHYSICAL EXAM
[Left] : left knee [NL (0)] : extension 0 degrees [4___] : quadriceps 4[unfilled]/5 [5___] : hamstring 5[unfilled]/5 [] : light touch is intact throughout [FreeTextEntry8] : still medial knee pain.  going down medial leg  [TWNoteComboBox7] : flexion 120 degrees

## 2023-05-17 ENCOUNTER — APPOINTMENT (OUTPATIENT)
Dept: PSYCHIATRY | Facility: CLINIC | Age: 43
End: 2023-05-17
Payer: COMMERCIAL

## 2023-05-17 PROCEDURE — 90837 PSYTX W PT 60 MINUTES: CPT | Mod: 95

## 2023-05-17 NOTE — END OF VISIT
[Duration of Psychotherapy Visit (minutes spent in synchronous communication): ____] : Duration of Psychotherapy Visit (minutes spent in synchronous communication): [unfilled] [Individual Psychotherapy for 53+ minutes] : Individual Psychotherapy for 53+ minutes  [Teletherapy Service Provided] : The services provided in this session were delivered via tele-therapy [FreeTextEntry3] : [place], [place] [FreeTextEntry5] : [facility] [place] [address], [address], [place] [address]  [Licensed Clinician] : Licensed Clinician

## 2023-05-17 NOTE — REASON FOR VISIT
[Telehealth (audio & video) - Individual/Group] : This visit was provided via telehealth using real-time 2-way audio visual technology. [Home] : The patient, [unfilled], was located at home, [unfilled], at the time of the visit. [Verbal consent obtained from patient/other participant(s)] : Verbal consent for telehealth/telephonic services obtained from patient/other participant(s) [Patient preference] : as per patient preference [Other Location: e.g. Home (Enter Location, City,State)___] : The provider was located at [unfilled]. [FreeTextEntry4] : 10:03 am [FreeTextEntry5] : 11:03 am [Patient] : Patient [FreeTextEntry1] : [place] is a 42 year female presenting today for a follow up psychotherapy session.\par
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
## 2023-05-30 LAB
ALDOSTERONE SERUM: 13.7 NG/DL
CORTICOSTEROID BIND GLOBULIN: 1.5 MG/DL
CORTIS SERPL-MCNC: 7.5 UG/DL
CORTISOL, FREE: 0.7 UG/DL
METANEPHRINE, PL: 13.2 PG/ML
NORMETANEPHRINE, PL: 179.8 PG/ML
PFCX: 9.3 %
RENIN ACTIVITY, PLASMA: 8.83 NG/ML/HR

## 2023-05-31 ENCOUNTER — APPOINTMENT (OUTPATIENT)
Dept: PSYCHIATRY | Facility: CLINIC | Age: 43
End: 2023-05-31
Payer: COMMERCIAL

## 2023-05-31 PROCEDURE — 90837 PSYTX W PT 60 MINUTES: CPT | Mod: 95
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
## 2023-05-31 NOTE — REASON FOR VISIT
[Patient preference] : as per patient preference [Telehealth (audio & video) - Individual/Group] : This visit was provided via telehealth using real-time 2-way audio visual technology. [Other Location: e.g. Home (Enter Location, City,State)___] : The provider was located at [unfilled]. [Home] : The patient, [unfilled], was located at home, [unfilled], at the time of the visit. [Verbal consent obtained from patient/other participant(s)] : Verbal consent for telehealth/telephonic services obtained from patient/other participant(s) [Patient] : Patient [FreeTextEntry4] : 9:58 am [FreeTextEntry5] : 10:58 am [FreeTextEntry1] : [place] is a 42 year female presenting today for a follow up psychotherapy session.\par

## 2023-05-31 NOTE — END OF VISIT
[Duration of Psychotherapy Visit (minutes spent in synchronous communication): ____] : Duration of Psychotherapy Visit (minutes spent in synchronous communication): [unfilled] [Individual Psychotherapy for 53+ minutes] : Individual Psychotherapy for 53+ minutes  [Teletherapy Service Provided] : The services provided in this session were delivered via tele-therapy [Licensed Clinician] : Licensed Clinician [FreeTextEntry3] : [place], [place] [FreeTextEntry5] : [facility] [place] [address], [address], [place] [address]

## 2023-06-05 ENCOUNTER — APPOINTMENT (OUTPATIENT)
Dept: ORTHOPEDIC SURGERY | Facility: CLINIC | Age: 43
End: 2023-06-05
Payer: COMMERCIAL

## 2023-06-05 VITALS — HEIGHT: 63 IN | WEIGHT: 221 LBS | BODY MASS INDEX: 39.16 KG/M2

## 2023-06-05 PROCEDURE — 99213 OFFICE O/P EST LOW 20 MIN: CPT

## 2023-06-05 NOTE — HISTORY OF PRESENT ILLNESS
[8] : 8 [4] : 4 [Dull/Aching] : dull/aching [Sharp] : sharp [de-identified] : 42 year old female followed for RT CTS and RT wrist volar ganglion. Ganglion is resolved at this time. She splinted 2 weeks on and 2 weeks off. Still with numbness, tingling and stiffness.  [FreeTextEntry1] : Right wrist  [de-identified] : n/a

## 2023-06-05 NOTE — DISCUSSION/SUMMARY
[de-identified] : Discussed the nature of the diagnosis and risk and benefits of different modalities of treatment.\par Will obtain an EMG to eval for CTS.\par Conservative management for CMC pain. \par RTO after EMG. \par

## 2023-06-05 NOTE — PHYSICAL EXAM
[Right] : right hand [1st] : 1st [CMC Joint] : CMC joint [de-identified] : R hand: \par Tender volar wrist \par Good finger ROM \par +Tinels \par +Phalens \par +Compression test \par \par  [] : no erythema

## 2023-06-06 ENCOUNTER — APPOINTMENT (OUTPATIENT)
Dept: PSYCHIATRY | Facility: CLINIC | Age: 43
End: 2023-06-06
Payer: COMMERCIAL

## 2023-06-06 PROCEDURE — 99213 OFFICE O/P EST LOW 20 MIN: CPT | Mod: 95

## 2023-06-06 PROCEDURE — 90833 PSYTX W PT W E/M 30 MIN: CPT | Mod: 95
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
## 2023-06-06 NOTE — HISTORY OF PRESENT ILLNESS
[FreeTextEntry1] : [place] states, "The medication has helped to keep me OK." \par Reports she has increased Lamotrigine since last visit from  one to two tablets daily, as she had been experiencing ongoing feelings of irritability and anxiety. No new medical issues since last visit, reports adherence to med regimen; tolerating well, no reported side effects or complications.\par Since increased dosage of Lamotrigine, she reports mood as "Very good." She reports improvement in focus and attention. Denies new/worsening acute depressive sx, denies SIIP/HIIP/NSSIB\par She reports anxiety has been manageable despite ongoing psychosocial stressors.  She rates feelings of anxiety on a scale 1-10, with 10 being the worst a 2/10, episodes are occurring 3-4 x weekly. Anxiety feels more manageable, denies episodes of panic. \par Appetite sx; low related to Ozempic, she is down 28 lbs since starting in March, Has not needed to utilize Alprazolam.\par Sleep sx; Reports she has been improving with sleep initiation, is able to fall back to sleep when she awakens, feels well rested in the morning. \par She remains engaged in work and social settings. \par Stressors: Reports work has been increasingly "Challenging." As been thinking about changing her job; interpersonal relations with partner\par Denies excess ETOH use/abuse. \par Denies cannabis and other substance use/abuse

## 2023-06-06 NOTE — PLAN
[Yes. details: ___] : Yes, [unfilled] [Medication education provided] : Medication education provided. [Rationale for medication choices, possible risks/precautions, benefits, alternative treatment choices, and consequences of non-treatment discussed] : Rationale for medication choices, possible risks/precautions, benefits, alternative treatment choices, and consequences of non-treatment discussed with patient/family/caregiver  [Admit to Program     (Add Program Admission information to a new column in the Admit/Discharge Flowsheet)] : Admit to program [Every ___ week(s)] : Psychotherapy: Every [unfilled] week(s) [Individual Therapy] : Individual Therapy [FreeTextEntry4] : \par TYPE OF THERAPY PERFORM: BRIEF CBT, SUPPORTIVE PSYCHOTHERAPY\par \par \par PROBLEM: Anxiety\par \par LONG TERM GOAL: Alleviate symptoms of Anxiety so as the patient may return to normal functioning.\par \par INTERVENTION: PATIENT WAS PROVIDED WITH 17 MINUTES, of supportive psychotherapy during today's session. Explored feelings/emotions surrounding interpersonal troubles with partner. Provider validated patients emotion/experience. Discussed sources of stress in work environment. Solution focused therapy provided. Reviewed psychoeducation on medication regime. Supportive listening and interactive feedback was provided. \par \par \par PATIENT RESPONSE: Patient participated actively and remained engaged in today's session, she/he is progressing well towards long-term treatment goal.\par \par CPT CODE: 00430\par  [FreeTextEntry5] : Psychoeducation and supportive therapy provided, and discussed rationale for the recommended medications\par Sleep hygiene education provided\par Medication: c/w Lamotrigine 50mg once daily, c/w Venlafaxine, c/w Alprazolam PRN \par \par Educated patient of importance of remaining abstinent from drugs and alcohol, risks of short-term and long-term use, hazards of combining with medications\par Emergency procedures were discussed: Pt educated to call [phone], 911 or go to the nearest ER for worsening symptoms/suicidal/homicidal ideations\par Continue individual psychotherapy to ongoing therapeutic benefits/learn coping skills\par RTC in  1 month or earlier as needed, please call the office to schedule follow up [phone]\par Patient give the opportunity to ask questions and review treatment; patient expressed understanding and agreement with the above plan.

## 2023-06-06 NOTE — REASON FOR VISIT
[Patient preference] : as per patient preference [Telehealth (audio & video) - Individual/Group] : This visit was provided via telehealth using real-time 2-way audio visual technology. [Medical Office: ([facility])___] : The provider was located at the medical office in [unfilled]. [Home] : The patient, [unfilled], was located at home, [unfilled], at the time of the visit. [Patient] : Patient [FreeTextEntry4] : 3464 [FreeTextEntry5] : 1000 [FreeTextEntry1] : OLI VALDIVIA is a 42 year old F,  being seen today for a follow up visit for medication management\par

## 2023-06-07 ENCOUNTER — APPOINTMENT (OUTPATIENT)
Dept: PSYCHIATRY | Facility: CLINIC | Age: 43
End: 2023-06-07
Payer: COMMERCIAL

## 2023-06-07 PROCEDURE — 90837 PSYTX W PT 60 MINUTES: CPT | Mod: 95

## 2023-06-07 NOTE — REASON FOR VISIT
[Patient preference] : as per patient preference [Telehealth (audio & video) - Individual/Group] : This visit was provided via telehealth using real-time 2-way audio visual technology. [Medical Office: ([facility])___] : The provider was located at the medical office in [unfilled]. [Home] : The patient, [unfilled], was located at home, [unfilled], at the time of the visit. [Verbal consent obtained from patient/other participant(s)] : Verbal consent for telehealth/telephonic services obtained from patient/other participant(s) [Patient] : Patient [FreeTextEntry4] : 10:02 am [FreeTextEntry5] : 10:58 am [FreeTextEntry1] : [place] is a 42 year female presenting today for a follow up psychotherapy session.\par
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
## 2023-06-07 NOTE — END OF VISIT
[Duration of Psychotherapy Visit (minutes spent in synchronous communication): ____] : Duration of Psychotherapy Visit (minutes spent in synchronous communication): [unfilled] [Individual Psychotherapy for 53+ minutes] : Individual Psychotherapy for 53+ minutes  [Teletherapy Service Provided] : The services provided in this session were delivered via tele-therapy [Licensed Clinician] : Licensed Clinician [FreeTextEntry3] : [place], [place] [FreeTextEntry5] : [facility] [place] [address], [address], [place] [address]

## 2023-06-12 ENCOUNTER — APPOINTMENT (OUTPATIENT)
Dept: NEUROLOGY | Facility: CLINIC | Age: 43
End: 2023-06-12
Payer: COMMERCIAL

## 2023-06-12 DIAGNOSIS — M79.641 PAIN IN RIGHT HAND: ICD-10-CM

## 2023-06-12 PROCEDURE — 95911 NRV CNDJ TEST 9-10 STUDIES: CPT

## 2023-06-12 PROCEDURE — 95886 MUSC TEST DONE W/N TEST COMP: CPT

## 2023-06-13 ENCOUNTER — APPOINTMENT (OUTPATIENT)
Dept: PSYCHIATRY | Facility: CLINIC | Age: 43
End: 2023-06-13
Payer: COMMERCIAL

## 2023-06-13 ENCOUNTER — NON-APPOINTMENT (OUTPATIENT)
Age: 43
End: 2023-06-13

## 2023-06-13 PROCEDURE — 90837 PSYTX W PT 60 MINUTES: CPT | Mod: 95
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
## 2023-06-13 NOTE — REASON FOR VISIT
[Patient preference] : as per patient preference [Telehealth (audio & video) - Individual/Group] : This visit was provided via telehealth using real-time 2-way audio visual technology. [Other Location: e.g. Home (Enter Location, City,State)___] : The provider was located at [unfilled]. [Home] : The patient, [unfilled], was located at home, [unfilled], at the time of the visit. [Verbal consent obtained from patient/other participant(s)] : Verbal consent for telehealth/telephonic services obtained from patient/other participant(s) [Patient] : Patient [FreeTextEntry4] : 10:00 am [FreeTextEntry5] : 10:55 am [FreeTextEntry1] : GE[place]GIA is a 42 year female presenting today for a follow up psychotherapy session.\par

## 2023-06-13 NOTE — END OF VISIT
[Duration of Psychotherapy Visit (minutes spent in synchronous communication): ____] : Duration of Psychotherapy Visit (minutes spent in synchronous communication): [unfilled] [Individual Psychotherapy for 53+ minutes] : Individual Psychotherapy for 53+ minutes  [Teletherapy Service Provided] : The services provided in this session were delivered via tele-therapy [Licensed Clinician] : Licensed Clinician [FreeTextEntry3] : [place], [place] [FreeTextEntry5] : Remote- MATT Palacio

## 2023-06-19 ENCOUNTER — APPOINTMENT (OUTPATIENT)
Dept: ORTHOPEDIC SURGERY | Facility: CLINIC | Age: 43
End: 2023-06-19
Payer: COMMERCIAL

## 2023-06-19 VITALS — WEIGHT: 221 LBS | BODY MASS INDEX: 39.16 KG/M2 | HEIGHT: 63 IN

## 2023-06-19 DIAGNOSIS — M67.431 GANGLION, RIGHT WRIST: ICD-10-CM

## 2023-06-19 PROCEDURE — 99214 OFFICE O/P EST MOD 30 MIN: CPT

## 2023-06-19 NOTE — DISCUSSION/SUMMARY
[de-identified] : Discussed the nature of the diagnosis and risk and benefits of different modalities of treatment.\par EMG reviewed. \par She would like to proceed surgically with RT CTR and will be contacted by surgical coordinator. \par \par For R CTR \par R/B/A of surgery discussed with the patient. Risks including but not limited to infection, nerve damage, tendon damage, pain, stiffness, recurrence, no resolution of symptoms, loss of function, limb or life. They understand and agree to surgery \par Return post op\par

## 2023-06-19 NOTE — HISTORY OF PRESENT ILLNESS
[7] : 7 [de-identified] : 42 year old female followed for RT CTS, CMC synovitis and RT wrist volar ganglion. she has returned after EMG.\par \par *EMG: mild RT CTS  [FreeTextEntry1] : Right wrist  [de-identified] : EMG

## 2023-06-19 NOTE — PHYSICAL EXAM
[de-identified] : R hand: \par Tender volar wrist \par Good finger ROM \par +Tinels \par +Phalens \par +Compression test \par \par

## 2023-06-21 ENCOUNTER — APPOINTMENT (OUTPATIENT)
Dept: PSYCHIATRY | Facility: CLINIC | Age: 43
End: 2023-06-21
Payer: COMMERCIAL

## 2023-06-21 PROCEDURE — 90834 PSYTX W PT 45 MINUTES: CPT | Mod: 95

## 2023-06-21 NOTE — REASON FOR VISIT
[Patient preference] : as per patient preference [Telehealth (audio & video) - Individual/Group] : This visit was provided via telehealth using real-time 2-way audio visual technology. [Other Location: e.g. Home (Enter Location, City,State)___] : The provider was located at [unfilled]. [Home] : The patient, [unfilled], was located at home, [unfilled], at the time of the visit. [Verbal consent obtained from patient/other participant(s)] : Verbal consent for telehealth/telephonic services obtained from patient/other participant(s) [Patient] : Patient [FreeTextEntry4] : 10:00 am [FreeTextEntry5] : 10:45 am [FreeTextEntry1] : [place]IA is a 42 year female presenting today for a follow up psychotherapy session.\par
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
## 2023-06-21 NOTE — END OF VISIT
[Duration of Psychotherapy Visit (minutes spent in synchronous communication): ____] : Duration of Psychotherapy Visit (minutes spent in synchronous communication): [unfilled] [Individual Psychotherapy for 38-52 minutes] : Individual Psychotherapy for 38 - 52 minutes [Teletherapy Service Provided] : The services provided in this session were delivered via tele-therapy [Licensed Clinician] : Licensed Clinician [FreeTextEntry3] : [place], [place] [FreeTextEntry5] : Remote- MATT Palacio

## 2023-06-28 ENCOUNTER — APPOINTMENT (OUTPATIENT)
Dept: PSYCHIATRY | Facility: CLINIC | Age: 43
End: 2023-06-28
Payer: COMMERCIAL

## 2023-06-28 PROCEDURE — 90837 PSYTX W PT 60 MINUTES: CPT | Mod: 95

## 2023-06-28 NOTE — END OF VISIT
[Duration of Psychotherapy Visit (minutes spent in synchronous communication): ____] : Duration of Psychotherapy Visit (minutes spent in synchronous communication): [unfilled] [Individual Psychotherapy for 53+ minutes] : Individual Psychotherapy for 53+ minutes  [Teletherapy Service Provided] : The services provided in this session were delivered via tele-therapy [Licensed Clinician] : Licensed Clinician [FreeTextEntry3] : [place], [place] [FreeTextEntry5] : [facility] [address], [address], [place] [address]

## 2023-06-28 NOTE — REASON FOR VISIT
[Patient preference] : as per patient preference [Telehealth (audio & video) - Individual/Group] : This visit was provided via telehealth using real-time 2-way audio visual technology. [Medical Office: ([facility])___] : The provider was located at the medical office in [unfilled]. [Home] : The patient, [unfilled], was located at home, [unfilled], at the time of the visit. [Verbal consent obtained from patient/other participant(s)] : Verbal consent for telehealth/telephonic services obtained from patient/other participant(s) [Patient] : Patient [FreeTextEntry4] : 10:02 am [FreeTextEntry5] : 11:00 am [FreeTextEntry1] : GE[place]GIA is a 42 year female presenting today for a follow up psychotherapy session.\par
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
## 2023-07-05 ENCOUNTER — APPOINTMENT (OUTPATIENT)
Dept: PSYCHIATRY | Facility: CLINIC | Age: 43
End: 2023-07-05

## 2023-07-05 ENCOUNTER — APPOINTMENT (OUTPATIENT)
Dept: FAMILY MEDICINE | Facility: CLINIC | Age: 43
End: 2023-07-05
Payer: COMMERCIAL

## 2023-07-05 VITALS
WEIGHT: 222 LBS | OXYGEN SATURATION: 98 % | BODY MASS INDEX: 39.34 KG/M2 | DIASTOLIC BLOOD PRESSURE: 80 MMHG | HEIGHT: 63 IN | HEART RATE: 82 BPM | SYSTOLIC BLOOD PRESSURE: 140 MMHG

## 2023-07-05 PROCEDURE — 99214 OFFICE O/P EST MOD 30 MIN: CPT

## 2023-07-05 RX ORDER — DICLOFENAC SODIUM 75 MG/1
75 TABLET, DELAYED RELEASE ORAL
Qty: 60 | Refills: 0 | Status: COMPLETED | COMMUNITY
Start: 2023-02-15 | End: 2023-07-05

## 2023-07-06 LAB
ALBUMIN SERPL ELPH-MCNC: 4.1 G/DL
ALP BLD-CCNC: 125 U/L
ALT SERPL-CCNC: 8 U/L
ANION GAP SERPL CALC-SCNC: 11 MMOL/L
APTT BLD: 30.3 SEC
AST SERPL-CCNC: 14 U/L
BILIRUB SERPL-MCNC: 0.2 MG/DL
BUN SERPL-MCNC: 6 MG/DL
CALCIUM SERPL-MCNC: 9 MG/DL
CHLORIDE SERPL-SCNC: 99 MMOL/L
CO2 SERPL-SCNC: 27 MMOL/L
CREAT SERPL-MCNC: 0.63 MG/DL
EGFR: 113 ML/MIN/1.73M2
GLUCOSE SERPL-MCNC: 95 MG/DL
HCG SERPL-MCNC: <1 MIU/ML
INR PPP: 0.99 RATIO
POTASSIUM SERPL-SCNC: 4.1 MMOL/L
PROT SERPL-MCNC: 6.9 G/DL
PT BLD: 11.7 SEC
SODIUM SERPL-SCNC: 137 MMOL/L

## 2023-07-07 NOTE — HISTORY OF PRESENT ILLNESS
[FreeTextEntry1] : RT carpal Tunnel  [FreeTextEntry2] : 07/20/2023 [FreeTextEntry3] : Dr. Olvera  [FreeTextEntry4] : GE[place]IA is a 42-year female here today for preoperative evaluation prior to RH carpal tunnel by Dr. Olvera on 07/20/2023. Pt states she is feeling well. Patient stated that she will schedule a follow-up appointment with her neurosurgeon Dr. Lancaster at [facility]. Her last neurologist visit was with Dr. Condon back in 2020. Pt is unknown on the reason for her strokes but will follow up for an appointment with her neurologist. She states that she has normal BP at home. Denies pregnancy. Denies any problem with anesthesia or prior surgeries. Pt develop symptoms recently on her hand, but states that she has not been treated for it yet.

## 2023-07-07 NOTE — ASSESSMENT
[Patient Optimized for Surgery] : Patient optimized for surgery [No Further Testing Recommended] : no further testing recommended [FreeTextEntry4] : 42 year old female with hypertension (controlled)

## 2023-07-07 NOTE — PLAN
[FreeTextEntry1] : -  Aspirin 325 mg due to strokes. The patient confirms that she has not seen a neurologist.\par - Topamax completely stopped due to not feeling good.  \par - Lamotrigine 25 mg 2 tablets daily\par - Venlafaxine 225 mg 1 tablet once a day\par - Ozempic- 2mg Pen-injector 0.25 mg weekly started in March Chey prescribed it for sugar and suggest going up on the dose.  \par - Metformin  mg 1 tablet daily\par - Atorvastatin 40 mg one tablet by mouth at bedtime \par - Ramipril 10 mg 1 capsule by mouth every 12 hours.\par - Amlodipine Besylate 10mg 1 tablet once daily.\par - Hydrochlorothiazide 25 mg 1 tablet daily\par - Diclofenac – was prescribed for temporary use only and is no longer taking it. \par \par - Patient will continue taking all her medication the way she is already doing, except she will hold the aspirin seven days prior to surgery.\par -Patient was advised to follow up with her neurologist as soon as possible.\par -Increase the dose of Ozempic during the next visit to help with weight. \par - Patient was advised to treat her severe obstructive sleep apnea. Weight loss is one of the ways to treat it, but she needs to treat long-term effects.\par -Pregnancy Test\par - Labs drawn in office today\par -Follow-up appointment with Chey. \par

## 2023-07-07 NOTE — END OF VISIT
[FreeTextEntry3] : Medical record entries made by the scribe today 07/05/2023, were at my direction and personally dictated to them by me, Dr. Yahaira Jose on 07/05/2023. I have reviewed the chart and agree that the record accurately reflects my personal performance of the history, physical exam, assessment, and plan.

## 2023-07-12 ENCOUNTER — APPOINTMENT (OUTPATIENT)
Dept: PSYCHIATRY | Facility: CLINIC | Age: 43
End: 2023-07-12

## 2023-07-12 ENCOUNTER — APPOINTMENT (OUTPATIENT)
Age: 43
End: 2023-07-12

## 2023-07-12 LAB
APPEARANCE: CLEAR
BACTERIA: ABNORMAL /HPF
BILIRUBIN URINE: NEGATIVE
BLOOD URINE: ABNORMAL
CAST: 0 /LPF
COLOR: YELLOW
EPITHELIAL CELLS: 35 /HPF
GLUCOSE QUALITATIVE U: NEGATIVE MG/DL
KETONES URINE: NEGATIVE MG/DL
LEUKOCYTE ESTERASE URINE: ABNORMAL
MICROSCOPIC-UA: NORMAL
NITRITE URINE: NEGATIVE
PH URINE: 6.5
PROTEIN URINE: NORMAL MG/DL
RED BLOOD CELLS URINE: 0 /HPF
REVIEW: NORMAL
SPECIFIC GRAVITY URINE: 1.02
UROBILINOGEN URINE: 0.2 MG/DL
WHITE BLOOD CELLS URINE: 4 /HPF

## 2023-07-19 ENCOUNTER — APPOINTMENT (OUTPATIENT)
Dept: PSYCHIATRY | Facility: CLINIC | Age: 43
End: 2023-07-19
Payer: COMMERCIAL

## 2023-07-19 ENCOUNTER — APPOINTMENT (OUTPATIENT)
Dept: ORTHOPEDIC SURGERY | Facility: CLINIC | Age: 43
End: 2023-07-19

## 2023-07-19 PROCEDURE — 90837 PSYTX W PT 60 MINUTES: CPT | Mod: 95
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
## 2023-07-19 NOTE — REASON FOR VISIT
[Patient preference] : as per patient preference [Telehealth (audio & video) - Individual/Group] : This visit was provided via telehealth using real-time 2-way audio visual technology. [Medical Office: ([facility])___] : The provider was located at the medical office in [unfilled]. [Home] : The patient, [unfilled], was located at home, [unfilled], at the time of the visit. [Verbal consent obtained from patient/other participant(s)] : Verbal consent for telehealth/telephonic services obtained from patient/other participant(s) [Patient] : Patient [FreeTextEntry4] : 10:00 am [FreeTextEntry5] : 10:58 am [FreeTextEntry1] : [place] is a 42 year female presenting today for a follow up psychotherapy session.\par

## 2023-07-19 NOTE — PHYSICAL EXAM
[Well groomed] : well groomed [Cooperative] : cooperative [Depressed] : depressed [Full] : full [Clear] : clear [Linear/Goal Directed] : linear/goal directed [None] : none [None Reported] : none reported [Average] : average [WNL] : within normal limits

## 2023-07-19 NOTE — END OF VISIT
[Duration of Psychotherapy Visit (minutes spent in synchronous communication): ____] : Duration of Psychotherapy Visit (minutes spent in synchronous communication): [unfilled] [Individual Psychotherapy for 53+ minutes] : Individual Psychotherapy for 53+ minutes  [Teletherapy Service Provided] : The services provided in this session were delivered via tele-therapy [Licensed Clinician] : Licensed Clinician [FreeTextEntry3] : [place], [place] [FreeTextEntry5] : [facility] [address], [address], [place] [address]

## 2023-07-20 ENCOUNTER — APPOINTMENT (OUTPATIENT)
Dept: ORTHOPEDIC SURGERY | Facility: AMBULATORY MEDICAL SERVICES | Age: 43
End: 2023-07-20
Payer: COMMERCIAL

## 2023-07-20 PROCEDURE — 64721 CARPAL TUNNEL SURGERY: CPT | Mod: RT

## 2023-07-26 ENCOUNTER — APPOINTMENT (OUTPATIENT)
Dept: PSYCHIATRY | Facility: CLINIC | Age: 43
End: 2023-07-26
Payer: COMMERCIAL

## 2023-07-26 PROCEDURE — 90837 PSYTX W PT 60 MINUTES: CPT | Mod: 95
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
## 2023-07-26 NOTE — REASON FOR VISIT
[Patient preference] : as per patient preference [Telehealth (audio & video) - Individual/Group] : This visit was provided via telehealth using real-time 2-way audio visual technology. [Home] : The patient, [unfilled], was located at home, [unfilled], at the time of the visit. [Verbal consent obtained from patient/other participant(s)] : Verbal consent for telehealth/telephonic services obtained from patient/other participant(s) [Patient] : Patient [Other Location: e.g. Home (Enter Location, City,State)___] : The provider was located at [unfilled]. [FreeTextEntry4] : 9:59 am [FreeTextEntry5] : 10:55 am [FreeTextEntry1] : GE[place]GIA is a 42 year female presenting today for a follow up psychotherapy session.\par

## 2023-07-26 NOTE — PHYSICAL EXAM
[Well groomed] : well groomed [Cooperative] : cooperative [Depressed] : depressed [Full] : full [Clear] : clear [Linear/Goal Directed] : linear/goal directed [None] : none [None Reported] : none reported [Average] : average [WNL] : within normal limits normal...

## 2023-07-26 NOTE — END OF VISIT
[Duration of Psychotherapy Visit (minutes spent in synchronous communication): ____] : Duration of Psychotherapy Visit (minutes spent in synchronous communication): [unfilled] [Individual Psychotherapy for 53+ minutes] : Individual Psychotherapy for 53+ minutes  [Teletherapy Service Provided] : The services provided in this session were delivered via tele-therapy [Licensed Clinician] : Licensed Clinician [FreeTextEntry3] : [place], [place] [FreeTextEntry5] : Remote- MATT Palacio

## 2023-07-27 ENCOUNTER — APPOINTMENT (OUTPATIENT)
Dept: FAMILY MEDICINE | Facility: CLINIC | Age: 43
End: 2023-07-27
Payer: COMMERCIAL

## 2023-07-27 VITALS
SYSTOLIC BLOOD PRESSURE: 138 MMHG | HEART RATE: 89 BPM | OXYGEN SATURATION: 96 % | BODY MASS INDEX: 39.34 KG/M2 | WEIGHT: 222 LBS | HEIGHT: 63 IN | DIASTOLIC BLOOD PRESSURE: 88 MMHG

## 2023-07-27 DIAGNOSIS — R31.29 OTHER MICROSCOPIC HEMATURIA: ICD-10-CM

## 2023-07-27 PROCEDURE — 99214 OFFICE O/P EST MOD 30 MIN: CPT

## 2023-07-28 NOTE — PLAN
[FreeTextEntry1] : BW drawn today\par Continue medication for HTN \par STRONGLY encourage mammo/sono \par Increase ozempic to 0.5mg weekly\par Follow up 4-6 weeks for weight management

## 2023-07-28 NOTE — PHYSICAL EXAM
[Normal] : normal rate, regular rhythm, normal S1 and S2 and no murmur heard [No Rash] : no rash [Normal Gait] : normal gait [Normal Affect] : the affect was normal [Normal Insight/Judgement] : insight and judgment were intact

## 2023-07-28 NOTE — HISTORY OF PRESENT ILLNESS
[FreeTextEntry1] : Follow up  [de-identified] : 43yo F presents for follow up on weight loss, DM2 and HTN. Pt reports her BP has been in a normal range at home. Pt reports minimal weight loss on ozempic 0.25mg. Pt would like to increase dosage. Pt has no negative side effects.

## 2023-07-31 ENCOUNTER — APPOINTMENT (OUTPATIENT)
Dept: ORTHOPEDIC SURGERY | Facility: CLINIC | Age: 43
End: 2023-07-31
Payer: COMMERCIAL

## 2023-07-31 ENCOUNTER — APPOINTMENT (OUTPATIENT)
Age: 43
End: 2023-07-31

## 2023-07-31 LAB
ALBUMIN SERPL ELPH-MCNC: 4.2 G/DL
ALP BLD-CCNC: 121 U/L
ALT SERPL-CCNC: 11 U/L
ANION GAP SERPL CALC-SCNC: 12 MMOL/L
APPEARANCE: ABNORMAL
AST SERPL-CCNC: 14 U/L
BACTERIA UR CULT: ABNORMAL
BACTERIA: ABNORMAL /HPF
BILIRUB SERPL-MCNC: 0.2 MG/DL
BILIRUBIN URINE: NEGATIVE
BLOOD URINE: ABNORMAL
BUN SERPL-MCNC: 9 MG/DL
CALCIUM SERPL-MCNC: 9.1 MG/DL
CAST: 2 /LPF
CHLORIDE SERPL-SCNC: 101 MMOL/L
CHOLEST SERPL-MCNC: 247 MG/DL
CO2 SERPL-SCNC: 27 MMOL/L
COLOR: YELLOW
CREAT SERPL-MCNC: 0.54 MG/DL
EGFR: 117 ML/MIN/1.73M2
EPITHELIAL CELLS: 20 /HPF
ESTIMATED AVERAGE GLUCOSE: 123 MG/DL
FERRITIN SERPL-MCNC: 11 NG/ML
GLUCOSE QUALITATIVE U: NEGATIVE MG/DL
GLUCOSE SERPL-MCNC: 98 MG/DL
HBA1C MFR BLD HPLC: 5.9 %
HDLC SERPL-MCNC: 77 MG/DL
IRON SATN MFR SERPL: 8 %
IRON SERPL-MCNC: 39 UG/DL
KETONES URINE: NEGATIVE MG/DL
LDLC SERPL CALC-MCNC: 144 MG/DL
LEUKOCYTE ESTERASE URINE: ABNORMAL
MAGNESIUM SERPL-MCNC: 2 MG/DL
MICROSCOPIC-UA: NORMAL
NITRITE URINE: NEGATIVE
NONHDLC SERPL-MCNC: 170 MG/DL
PH URINE: 6.5
POTASSIUM SERPL-SCNC: 4.1 MMOL/L
PROT SERPL-MCNC: 6.8 G/DL
PROTEIN URINE: NORMAL MG/DL
RED BLOOD CELLS URINE: 1 /HPF
SODIUM SERPL-SCNC: 140 MMOL/L
SPECIFIC GRAVITY URINE: 1.02
TIBC SERPL-MCNC: 463 UG/DL
TRIGL SERPL-MCNC: 149 MG/DL
UIBC SERPL-MCNC: 424 UG/DL
UROBILINOGEN URINE: 0.2 MG/DL
WHITE BLOOD CELLS URINE: 9 /HPF

## 2023-07-31 PROCEDURE — 99024 POSTOP FOLLOW-UP VISIT: CPT

## 2023-08-01 ENCOUNTER — APPOINTMENT (OUTPATIENT)
Dept: PSYCHIATRY | Facility: CLINIC | Age: 43
End: 2023-08-01
Payer: COMMERCIAL

## 2023-08-01 DIAGNOSIS — F40.243 FEAR OF FLYING: ICD-10-CM

## 2023-08-01 PROCEDURE — 99214 OFFICE O/P EST MOD 30 MIN: CPT | Mod: 95

## 2023-08-01 PROCEDURE — 90833 PSYTX W PT W E/M 30 MIN: CPT | Mod: 95
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
## 2023-08-01 NOTE — PLAN
[Yes. details: ___] : Yes, [unfilled] [Medication education provided] : Medication education provided. [Rationale for medication choices, possible risks/precautions, benefits, alternative treatment choices, and consequences of non-treatment discussed] : Rationale for medication choices, possible risks/precautions, benefits, alternative treatment choices, and consequences of non-treatment discussed with patient/family/caregiver  [FreeTextEntry4] : TYPE OF THERAPY PERFORM: BRIEF CBT, SUPPORTIVE PSYCHOTHERAPY, BEHAVIORAL MODIFICATION   PROBLEM: Anxiety/ Depression/ ADHD symptoms/ Mood disorder/ OCD  LONG TERM GOAL: Alleviate symptoms of * so as the patient may return to normal functioning.  INTERVENTION: PATIENT WAS PROVIDED WITH 16 MINUTES, of supportive psychotherapy during today's session. Explored feelings/emotions surrounding current stressors. Discussed with patient stressor at work and struggles with interpersonal communication skills. Provider validated patient's emotion/experience. Explored communications style/types. Discussed sources of ki, and upcoming experiences, patient expressing future motivation. Explored current coping skills and strategies to relieve current symptoms. Explore solution focused options to reduced stressors including creative behavioral modifications. Focused on positive achievements towards mental health goals, and instilled hope.  Reviewed/reinforced psychoeducation on medication regime, diagnosis and treatment plan. Additionally, discussed/reviewed the incorporation of holistic modalities to enhance mood and reduce feelings of anxiety. Supportive listening and interactive feedback was provided.    PATIENT RESPONSE: Patient participated actively and remained engaged in today's session, patient is progressing well towards long-term treatment goal.   [FreeTextEntry5] : Follow-up and Monitoring:   - Patient was given opportunity to ask questions, all questions were answered, patient is agreeable to treatment plan   - Psychoeducation and supportive therapy provided, and discussed rationale for the recommended medications   - Educated patient of importance of remaining abstinent from drugs and alcohol, risks of short-term and long-term use, hazards of combining with medications   - Emergency procedures were discussed: Pt educated to call [phone], 911 or go to the nearest ER for worsening symptoms/suicidal/homicidal ideations   - Follow-up appointment scheduled for : 12 weeks, please call the office to schedule follow up [phone]   Plan for next visit:   - Assess patient's response to medication changes and overall mental health status.   - Address any new symptoms or concerns that may arise.

## 2023-08-01 NOTE — HISTORY OF PRESENT ILLNESS
[FreeTextEntry1] : Georgia reports she has been compliant with the current medication regime. She had right wrist carpal tunnel surgery July 20th, she is recovering well from the procedure. She reports her mood as "Good." She feels her mood has improved overall since starting the Lamotrigine. She reports that anxiety has been more manageable, she "doesn't get stuck in anxious thoughts." She reports sleep initiation has improved and is having no difficulties with sleep quality or duration. She reports her appetite has been unremarkable, she has continued to take Ozempic for weight loss. She reports having lost 30lbs.  Her Ha1c most recently was 5.9. She continues to have stressors related to work and interpersonal relationships.   GEORGIA denies SIIP/HIIP/NSSIB. GE[place]GIA denies excess ETOH use/abuse, cannabis and other substance use/abuse, GEORGIA denies features of mindy/ hypomania, or AVH, no  delusional thinking or paranoia was elicited. GEORGIA denies new medical issues or new medication since last visit. GEORGIA  remains engaged in interpersonal relationships, individual psychotherapy, social settings, and maintaining ADLs. GEORGIA is agreeable to ongoing medication titration.

## 2023-08-01 NOTE — PHYSICAL EXAM
[Cooperative] : cooperative [Euthymic] : euthymic [Full] : full [Clear] : clear [Linear/Goal Directed] : linear/goal directed [None] : none [None Reported] : none reported [Average] : average [WNL] : within normal limits [FreeTextEntry2] : Unwitnessed [FreeTextEntry3] : Unwitnessed

## 2023-08-01 NOTE — REASON FOR VISIT
[Patient preference] : as per patient preference [Telehealth (audio & video) - Individual/Group] : This visit was provided via telehealth using real-time 2-way audio visual technology. [Other Location: e.g. Home (Enter Location, City,State)___] : The provider was located at [unfilled]. [Home] : The patient, [unfilled], was located at home, [unfilled], at the time of the visit. [Patient] : Patient [FreeTextEntry4] : 6144 [FreeTextEntry5] : 1000 [FreeTextEntry1] : OLI VALDIVIA is a 42 year old F,  being seen today for a follow up visit for medication management

## 2023-08-02 ENCOUNTER — APPOINTMENT (OUTPATIENT)
Age: 43
End: 2023-08-02

## 2023-08-02 ENCOUNTER — APPOINTMENT (OUTPATIENT)
Dept: PSYCHIATRY | Facility: CLINIC | Age: 43
End: 2023-08-02
Payer: COMMERCIAL

## 2023-08-02 PROCEDURE — 90837 PSYTX W PT 60 MINUTES: CPT | Mod: 95

## 2023-08-09 ENCOUNTER — APPOINTMENT (OUTPATIENT)
Dept: PSYCHIATRY | Facility: CLINIC | Age: 43
End: 2023-08-09
Payer: COMMERCIAL

## 2023-08-09 PROCEDURE — 90837 PSYTX W PT 60 MINUTES: CPT | Mod: 95

## 2023-08-09 NOTE — END OF VISIT
[Duration of Psychotherapy Visit (minutes spent in synchronous communication): ____] : Duration of Psychotherapy Visit (minutes spent in synchronous communication): [unfilled] [Individual Psychotherapy for 53+ minutes] : Individual Psychotherapy for 53+ minutes  [Teletherapy Service Provided] : The services provided in this session were delivered via tele-therapy [Licensed Clinician] : Licensed Clinician [FreeTextEntry3] : [place], [place] [FreeTextEntry5] : Remote- MATT Palacio
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
## 2023-08-09 NOTE — REASON FOR VISIT
[Patient preference] : as per patient preference [Telehealth (audio & video) - Individual/Group] : This visit was provided via telehealth using real-time 2-way audio visual technology. [Other Location: e.g. Home (Enter Location, City,State)___] : The provider was located at [unfilled]. [Home] : The patient, [unfilled], was located at home, [unfilled], at the time of the visit. [Verbal consent obtained from patient/other participant(s)] : Verbal consent for telehealth/telephonic services obtained from patient/other participant(s) [Patient] : Patient [FreeTextEntry4] : 9:59 am [FreeTextEntry5] : 10:56 am [FreeTextEntry1] : GEORGIA is a 42 year female presenting today for a follow up psychotherapy session.\par

## 2023-08-09 NOTE — REASON FOR VISIT
[Patient preference] : as per patient preference [Telehealth (audio & video) - Individual/Group] : This visit was provided via telehealth using real-time 2-way audio visual technology. [Other Location: e.g. Home (Enter Location, City,State)___] : The provider was located at [unfilled]. [Home] : The patient, [unfilled], was located at home, [unfilled], at the time of the visit. [Verbal consent obtained from patient/other participant(s)] : Verbal consent for telehealth/telephonic services obtained from patient/other participant(s) [Patient] : Patient [FreeTextEntry4] : 10:02 am [FreeTextEntry5] : 10:57 am [FreeTextEntry1] : GEORGIA is a 42 year female presenting today for a follow up psychotherapy session.\par
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
## 2023-08-09 NOTE — END OF VISIT
[Duration of Psychotherapy Visit (minutes spent in synchronous communication): ____] : Duration of Psychotherapy Visit (minutes spent in synchronous communication): [unfilled] [Individual Psychotherapy for 53+ minutes] : Individual Psychotherapy for 53+ minutes  [Teletherapy Service Provided] : The services provided in this session were delivered via tele-therapy [Licensed Clinician] : Licensed Clinician [FreeTextEntry3] : [place], [place] [FreeTextEntry5] : Remote- MATT Palacio

## 2023-08-15 VITALS — BODY MASS INDEX: 39.34 KG/M2 | WEIGHT: 222 LBS | HEIGHT: 63 IN

## 2023-08-16 ENCOUNTER — APPOINTMENT (OUTPATIENT)
Dept: PSYCHIATRY | Facility: CLINIC | Age: 43
End: 2023-08-16
Payer: COMMERCIAL

## 2023-08-16 PROCEDURE — 90837 PSYTX W PT 60 MINUTES: CPT | Mod: 95
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
## 2023-08-16 NOTE — REASON FOR VISIT
[Patient preference] : as per patient preference [Telehealth (audio & video) - Individual/Group] : This visit was provided via telehealth using real-time 2-way audio visual technology. [Other Location: e.g. Home (Enter Location, City,State)___] : The provider was located at [unfilled]. [Home] : The patient, [unfilled], was located at home, [unfilled], at the time of the visit. [Verbal consent obtained from patient/other participant(s)] : Verbal consent for telehealth/telephonic services obtained from patient/other participant(s) [Patient] : Patient [FreeTextEntry4] : 10:03 am [FreeTextEntry5] : 11:00 am [FreeTextEntry1] : GEORGIA is a 42-year female presenting today for a follow up psychotherapy session.

## 2023-08-16 NOTE — PHYSICAL EXAM
[Well groomed] : well groomed [Cooperative] : cooperative [Full] : full [Clear] : clear [Linear/Goal Directed] : linear/goal directed [None] : none [None Reported] : none reported [Average] : average [WNL] : within normal limits [Euthymic] : euthymic

## 2023-08-16 NOTE — END OF VISIT
[Individual Psychotherapy for 53+ minutes] : Individual Psychotherapy for 53+ minutes  [Teletherapy Service Provided] : The services provided in this session were delivered via tele-therapy [Licensed Clinician] : Licensed Clinician [Duration of Psychotherapy Visit (minutes spent in synchronous communication): ____] : Duration of Psychotherapy Visit (minutes spent in synchronous communication): [unfilled] [FreeTextEntry3] : [place], [place] [FreeTextEntry5] : Remote- MATT Palacio

## 2023-08-17 ENCOUNTER — APPOINTMENT (OUTPATIENT)
Dept: FAMILY MEDICINE | Facility: CLINIC | Age: 43
End: 2023-08-17
Payer: COMMERCIAL

## 2023-08-17 VITALS
DIASTOLIC BLOOD PRESSURE: 74 MMHG | BODY MASS INDEX: 38.62 KG/M2 | HEART RATE: 80 BPM | SYSTOLIC BLOOD PRESSURE: 140 MMHG | WEIGHT: 218 LBS | OXYGEN SATURATION: 93 % | HEIGHT: 63 IN

## 2023-08-17 DIAGNOSIS — N39.0 URINARY TRACT INFECTION, SITE NOT SPECIFIED: ICD-10-CM

## 2023-08-17 LAB
BILIRUB UR QL STRIP: NEGATIVE
CLARITY UR: NORMAL
COLLECTION METHOD: NORMAL
GLUCOSE UR-MCNC: NEGATIVE
HCG UR QL: 0.2 EU/DL
HGB UR QL STRIP.AUTO: ABNORMAL
KETONES UR-MCNC: NEGATIVE
LEUKOCYTE ESTERASE UR QL STRIP: NORMAL
NITRITE UR QL STRIP: NEGATIVE
PH UR STRIP: 6.5
PROT UR STRIP-MCNC: >=300
SP GR UR STRIP: >=1.03

## 2023-08-17 PROCEDURE — 81003 URINALYSIS AUTO W/O SCOPE: CPT | Mod: QW

## 2023-08-17 PROCEDURE — 99213 OFFICE O/P EST LOW 20 MIN: CPT | Mod: 25

## 2023-08-17 NOTE — HISTORY OF PRESENT ILLNESS
[FreeTextEntry8] : 41yo F presents with dysuria, abdominal cramping and mild flank pain. Pt denies fevers, chills, hematuria. Pt reports symptoms feel like previous UTI in past. Pt reports not intense flank pain just discomfort.

## 2023-08-17 NOTE — PLAN
[FreeTextEntry1] : UA/UC ordered Start keflex Discussed proper hygiene, urinating frequently, urinating before/after intercourse, increase hydration if fevers/chills develop call office or go to ED

## 2023-08-18 ENCOUNTER — APPOINTMENT (OUTPATIENT)
Age: 43
End: 2023-08-18

## 2023-08-18 LAB
APPEARANCE: ABNORMAL
BACTERIA: ABNORMAL /HPF
BILIRUBIN URINE: NEGATIVE
BLOOD URINE: ABNORMAL
CAST: 0 /LPF
COLOR: YELLOW
EPITHELIAL CELLS: 6 /HPF
GLUCOSE QUALITATIVE U: NEGATIVE MG/DL
KETONES URINE: NEGATIVE MG/DL
LEUKOCYTE ESTERASE URINE: ABNORMAL
MICROSCOPIC-UA: NORMAL
NITRITE URINE: NEGATIVE
PH URINE: 6
PROTEIN URINE: 300 MG/DL
RED BLOOD CELLS URINE: 164 /HPF
SPECIFIC GRAVITY URINE: 1.02
UROBILINOGEN URINE: 0.2 MG/DL
WHITE BLOOD CELLS URINE: >998 /HPF

## 2023-08-19 ENCOUNTER — RESULT REVIEW (OUTPATIENT)
Age: 43
End: 2023-08-19

## 2023-08-19 ENCOUNTER — APPOINTMENT (OUTPATIENT)
Dept: MAMMOGRAPHY | Facility: CLINIC | Age: 43
End: 2023-08-19
Payer: COMMERCIAL

## 2023-08-19 ENCOUNTER — OUTPATIENT (OUTPATIENT)
Dept: OUTPATIENT SERVICES | Facility: HOSPITAL | Age: 43
LOS: 1 days | End: 2023-08-19
Payer: COMMERCIAL

## 2023-08-19 ENCOUNTER — APPOINTMENT (OUTPATIENT)
Dept: ULTRASOUND IMAGING | Facility: CLINIC | Age: 43
End: 2023-08-19
Payer: COMMERCIAL

## 2023-08-19 DIAGNOSIS — R32 UNSPECIFIED URINARY INCONTINENCE: ICD-10-CM

## 2023-08-19 DIAGNOSIS — I10 ESSENTIAL (PRIMARY) HYPERTENSION: ICD-10-CM

## 2023-08-19 PROCEDURE — 77063 BREAST TOMOSYNTHESIS BI: CPT | Mod: 26

## 2023-08-19 PROCEDURE — 77067 SCR MAMMO BI INCL CAD: CPT | Mod: 26

## 2023-08-19 PROCEDURE — 77067 SCR MAMMO BI INCL CAD: CPT

## 2023-08-19 PROCEDURE — 76770 US EXAM ABDO BACK WALL COMP: CPT

## 2023-08-19 PROCEDURE — 76770 US EXAM ABDO BACK WALL COMP: CPT | Mod: 26

## 2023-08-19 PROCEDURE — 77063 BREAST TOMOSYNTHESIS BI: CPT

## 2023-08-21 ENCOUNTER — APPOINTMENT (OUTPATIENT)
Age: 43
End: 2023-08-21

## 2023-08-21 LAB — BACTERIA UR CULT: ABNORMAL

## 2023-08-24 NOTE — HISTORY OF PRESENT ILLNESS
[] : Post Surgical Visit: yes [de-identified] : 42 year old female presenting 11 days s/p RT CTR. She is doing well. Good ROM.  DOS: 7/20/23  [de-identified] : 7/20/23 [de-identified] : right carpal tunnel release

## 2023-08-24 NOTE — DISCUSSION/SUMMARY
[de-identified] : Bandage and sutures removed today.  No heavy lifting 1 more week.  AROM. Wound care discussed.  RTO 4 weeks. Yes

## 2023-08-30 ENCOUNTER — APPOINTMENT (OUTPATIENT)
Dept: PSYCHIATRY | Facility: CLINIC | Age: 43
End: 2023-08-30

## 2023-09-06 ENCOUNTER — APPOINTMENT (OUTPATIENT)
Dept: PSYCHIATRY | Facility: CLINIC | Age: 43
End: 2023-09-06
Payer: COMMERCIAL

## 2023-09-06 PROCEDURE — 90837 PSYTX W PT 60 MINUTES: CPT | Mod: 95

## 2023-09-06 NOTE — END OF VISIT
SUBJECTIVE:   2 m.o. male brought in by both parents for routine check up. Diet:   formula  Development: coos. Parental concerns: has had mild cough past 3 days. OBJECTIVE:   GENERAL: well-developed, well-nourished infant  HEAD: normal size/shape, anterior fontanel flat and soft  EYES: red reflex present bilaterally  ENT: TMs gray, nose and mouth clear  NECK: supple  RESP: clear to auscultation bilaterally  CV: regular rhythm without murmurs, peripheral pulses normal,  no clubbing, cyanosis, or edema. ABD: soft, non-tender, no masses, no organomegaly. : normal male, testes descended bilaterally, no inguinal hernia, no hydrocele  MS: No hip clicks, normal abduction, no subluxation  SKIN: normal  NEURO: intact  Growth/Development: normal    ASSESSMENT:   Well Baby    PLAN:   Immunizations reviewed and brought up to date per orders. Counseling: development, feeding, skin care, sleep habits and positions and well care schedule. Follow up in 2 months for well care. [Duration of Psychotherapy Visit (minutes spent in synchronous communication): ____] : Duration of Psychotherapy Visit (minutes spent in synchronous communication): [unfilled] [Individual Psychotherapy for 53+ minutes] : Individual Psychotherapy for 53+ minutes  [Teletherapy Service Provided] : The services provided in this session were delivered via tele-therapy [Licensed Clinician] : Licensed Clinician [FreeTextEntry3] : [place], [place] [FreeTextEntry5] : Remote- MATT Palacio
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
## 2023-09-06 NOTE — REASON FOR VISIT
[Patient preference] : as per patient preference [Telehealth (audio & video) - Individual/Group] : This visit was provided via telehealth using real-time 2-way audio visual technology. [Other Location: e.g. Home (Enter Location, City,State)___] : The provider was located at [unfilled]. [Home] : The patient, [unfilled], was located at home, [unfilled], at the time of the visit. [Verbal consent obtained from patient/other participant(s)] : Verbal consent for telehealth/telephonic services obtained from patient/other participant(s) [Patient] : Patient [FreeTextEntry4] : 10:01 am [FreeTextEntry5] : 11:00 am [FreeTextEntry1] : [place] is a 42-year female presenting today for a follow up psychotherapy session.

## 2023-09-07 ENCOUNTER — APPOINTMENT (OUTPATIENT)
Dept: ORTHOPEDIC SURGERY | Facility: CLINIC | Age: 43
End: 2023-09-07
Payer: COMMERCIAL

## 2023-09-07 VITALS — WEIGHT: 218 LBS | BODY MASS INDEX: 38.62 KG/M2 | HEIGHT: 63 IN

## 2023-09-07 DIAGNOSIS — M65.9 SYNOVITIS AND TENOSYNOVITIS, UNSPECIFIED: ICD-10-CM

## 2023-09-07 PROCEDURE — 99213 OFFICE O/P EST LOW 20 MIN: CPT | Mod: 24

## 2023-09-07 NOTE — DISCUSSION/SUMMARY
[de-identified] : Discussed the nature of the diagnosis and risk and benefits of different modalities of treatment. Encouraged continued scar massage.  NSAIDs and warm compress for CMC pain. RTO 4 weeks.

## 2023-09-07 NOTE — HISTORY OF PRESENT ILLNESS
[7] : 7 [2] : 2 [Sharp] : sharp [Nothing helps with pain getting better] : Nothing helps with pain getting better [Full time] : Work status: full time [de-identified] : 7 weeks s/p [employer].  She complains of radial sided hand pain,  worse with griping DOS: 7/20/23  [] : no [de-identified] : lifting, grabbing  [de-identified] : 7/20/23 [de-identified] : [employer]

## 2023-09-12 ENCOUNTER — TRANSCRIPTION ENCOUNTER (OUTPATIENT)
Age: 43
End: 2023-09-12

## 2023-09-13 ENCOUNTER — APPOINTMENT (OUTPATIENT)
Dept: PSYCHIATRY | Facility: CLINIC | Age: 43
End: 2023-09-13
Payer: COMMERCIAL

## 2023-09-13 PROCEDURE — 90837 PSYTX W PT 60 MINUTES: CPT | Mod: 95

## 2023-09-20 ENCOUNTER — APPOINTMENT (OUTPATIENT)
Dept: PSYCHIATRY | Facility: CLINIC | Age: 43
End: 2023-09-20
Payer: COMMERCIAL

## 2023-09-20 PROCEDURE — 90837 PSYTX W PT 60 MINUTES: CPT | Mod: 95

## 2023-09-28 ENCOUNTER — APPOINTMENT (OUTPATIENT)
Dept: PSYCHIATRY | Facility: CLINIC | Age: 43
End: 2023-09-28
Payer: COMMERCIAL

## 2023-09-28 PROCEDURE — 90837 PSYTX W PT 60 MINUTES: CPT | Mod: GT

## 2023-10-04 ENCOUNTER — APPOINTMENT (OUTPATIENT)
Dept: PSYCHIATRY | Facility: CLINIC | Age: 43
End: 2023-10-04
Payer: COMMERCIAL

## 2023-10-04 PROCEDURE — 90837 PSYTX W PT 60 MINUTES: CPT | Mod: GT

## 2023-10-11 ENCOUNTER — APPOINTMENT (OUTPATIENT)
Dept: PSYCHIATRY | Facility: CLINIC | Age: 43
End: 2023-10-11
Payer: COMMERCIAL

## 2023-10-11 ENCOUNTER — APPOINTMENT (OUTPATIENT)
Dept: FAMILY MEDICINE | Facility: CLINIC | Age: 43
End: 2023-10-11
Payer: COMMERCIAL

## 2023-10-11 VITALS
DIASTOLIC BLOOD PRESSURE: 96 MMHG | OXYGEN SATURATION: 98 % | WEIGHT: 218 LBS | HEART RATE: 82 BPM | BODY MASS INDEX: 38.62 KG/M2 | HEIGHT: 63 IN | SYSTOLIC BLOOD PRESSURE: 142 MMHG

## 2023-10-11 DIAGNOSIS — E11.9 TYPE 2 DIABETES MELLITUS W/OUT COMPLICATIONS: ICD-10-CM

## 2023-10-11 PROCEDURE — 36415 COLL VENOUS BLD VENIPUNCTURE: CPT

## 2023-10-11 PROCEDURE — 99214 OFFICE O/P EST MOD 30 MIN: CPT | Mod: 25

## 2023-10-11 PROCEDURE — 90837 PSYTX W PT 60 MINUTES: CPT | Mod: GT

## 2023-10-11 RX ORDER — CEPHALEXIN 500 MG/1
500 TABLET ORAL
Qty: 14 | Refills: 0 | Status: DISCONTINUED | COMMUNITY
Start: 2023-08-17 | End: 2023-10-11

## 2023-10-11 RX ORDER — HYDROCHLOROTHIAZIDE 25 MG/1
25 TABLET ORAL
Qty: 90 | Refills: 3 | Status: ACTIVE | COMMUNITY
Start: 2022-09-30 | End: 1900-01-01

## 2023-10-12 ENCOUNTER — APPOINTMENT (OUTPATIENT)
Dept: ORTHOPEDIC SURGERY | Facility: CLINIC | Age: 43
End: 2023-10-12

## 2023-10-12 ENCOUNTER — APPOINTMENT (OUTPATIENT)
Dept: ORTHOPEDIC SURGERY | Facility: CLINIC | Age: 43
End: 2023-10-12
Payer: COMMERCIAL

## 2023-10-12 VITALS — BODY MASS INDEX: 38.62 KG/M2 | HEIGHT: 63 IN | WEIGHT: 218 LBS

## 2023-10-12 PROBLEM — E11.9 DIABETES MELLITUS: Status: ACTIVE | Noted: 2018-10-11

## 2023-10-12 PROCEDURE — 99214 OFFICE O/P EST MOD 30 MIN: CPT | Mod: 24

## 2023-10-13 ENCOUNTER — APPOINTMENT (OUTPATIENT)
Age: 43
End: 2023-10-13

## 2023-10-13 LAB
ALBUMIN SERPL ELPH-MCNC: 4.3 G/DL
ALP BLD-CCNC: 117 U/L
ALT SERPL-CCNC: 7 U/L
ANION GAP SERPL CALC-SCNC: 10 MMOL/L
AST SERPL-CCNC: 12 U/L
BASOPHILS # BLD AUTO: 0.04 K/UL
BASOPHILS NFR BLD AUTO: 0.8 %
BILIRUB SERPL-MCNC: 0.3 MG/DL
BUN SERPL-MCNC: 6 MG/DL
CALCIUM SERPL-MCNC: 8.8 MG/DL
CHLORIDE SERPL-SCNC: 99 MMOL/L
CHOLEST SERPL-MCNC: 247 MG/DL
CO2 SERPL-SCNC: 28 MMOL/L
CREAT SERPL-MCNC: 0.57 MG/DL
EGFR: 116 ML/MIN/1.73M2
EOSINOPHIL # BLD AUTO: 0.14 K/UL
EOSINOPHIL NFR BLD AUTO: 2.7 %
ESTIMATED AVERAGE GLUCOSE: 117 MG/DL
FERRITIN SERPL-MCNC: 8 NG/ML
GLUCOSE SERPL-MCNC: 103 MG/DL
HBA1C MFR BLD HPLC: 5.7 %
HCT VFR BLD CALC: 40.1 %
HDLC SERPL-MCNC: 82 MG/DL
HGB BLD-MCNC: 11.9 G/DL
IMM GRANULOCYTES NFR BLD AUTO: 0.2 %
IRON SATN MFR SERPL: 7 %
IRON SERPL-MCNC: 29 UG/DL
LDLC SERPL CALC-MCNC: 148 MG/DL
LYMPHOCYTES # BLD AUTO: 1.47 K/UL
LYMPHOCYTES NFR BLD AUTO: 27.9 %
MAN DIFF?: NORMAL
MCHC RBC-ENTMCNC: 25.2 PG
MCHC RBC-ENTMCNC: 29.7 GM/DL
MCV RBC AUTO: 84.8 FL
MONOCYTES # BLD AUTO: 0.36 K/UL
MONOCYTES NFR BLD AUTO: 6.8 %
NEUTROPHILS # BLD AUTO: 3.24 K/UL
NEUTROPHILS NFR BLD AUTO: 61.6 %
NONHDLC SERPL-MCNC: 164 MG/DL
PLATELET # BLD AUTO: 478 K/UL
POTASSIUM SERPL-SCNC: 4.2 MMOL/L
PROT SERPL-MCNC: 7 G/DL
RBC # BLD: 4.73 M/UL
RBC # FLD: 14.9 %
SODIUM SERPL-SCNC: 138 MMOL/L
TIBC SERPL-MCNC: 442 UG/DL
TRIGL SERPL-MCNC: 99 MG/DL
UIBC SERPL-MCNC: 413 UG/DL
WBC # FLD AUTO: 5.26 K/UL

## 2023-10-16 ENCOUNTER — APPOINTMENT (OUTPATIENT)
Dept: ORTHOPEDIC SURGERY | Facility: CLINIC | Age: 43
End: 2023-10-16

## 2023-10-18 ENCOUNTER — APPOINTMENT (OUTPATIENT)
Dept: PSYCHIATRY | Facility: CLINIC | Age: 43
End: 2023-10-18

## 2023-10-21 ENCOUNTER — APPOINTMENT (OUTPATIENT)
Dept: ORTHOPEDIC SURGERY | Facility: CLINIC | Age: 43
End: 2023-10-21
Payer: COMMERCIAL

## 2023-10-21 VITALS — WEIGHT: 218 LBS | BODY MASS INDEX: 38.62 KG/M2 | HEIGHT: 63 IN

## 2023-10-21 PROCEDURE — 99214 OFFICE O/P EST MOD 30 MIN: CPT

## 2023-10-21 PROCEDURE — 72100 X-RAY EXAM L-S SPINE 2/3 VWS: CPT

## 2023-10-21 RX ORDER — METHOCARBAMOL 750 MG/1
750 TABLET, FILM COATED ORAL
Qty: 30 | Refills: 0 | Status: ACTIVE | COMMUNITY
Start: 2023-10-21 | End: 1900-01-01

## 2023-10-21 RX ORDER — METHYLPREDNISOLONE 4 MG/1
4 TABLET ORAL
Qty: 1 | Refills: 0 | Status: ACTIVE | COMMUNITY
Start: 2023-10-21 | End: 1900-01-01

## 2023-10-25 ENCOUNTER — APPOINTMENT (OUTPATIENT)
Dept: PSYCHIATRY | Facility: CLINIC | Age: 43
End: 2023-10-25
Payer: COMMERCIAL

## 2023-10-25 PROCEDURE — 90837 PSYTX W PT 60 MINUTES: CPT | Mod: GT

## 2023-10-26 ENCOUNTER — APPOINTMENT (OUTPATIENT)
Dept: ORTHOPEDIC SURGERY | Facility: CLINIC | Age: 43
End: 2023-10-26
Payer: COMMERCIAL

## 2023-10-26 VITALS — HEIGHT: 63 IN | WEIGHT: 218 LBS | BODY MASS INDEX: 38.62 KG/M2

## 2023-10-26 PROCEDURE — 73110 X-RAY EXAM OF WRIST: CPT | Mod: RT

## 2023-10-26 PROCEDURE — 99213 OFFICE O/P EST LOW 20 MIN: CPT

## 2023-11-01 ENCOUNTER — APPOINTMENT (OUTPATIENT)
Dept: PSYCHIATRY | Facility: CLINIC | Age: 43
End: 2023-11-01
Payer: COMMERCIAL

## 2023-11-01 PROCEDURE — 90837 PSYTX W PT 60 MINUTES: CPT | Mod: GT

## 2023-11-08 ENCOUNTER — APPOINTMENT (OUTPATIENT)
Dept: PSYCHIATRY | Facility: CLINIC | Age: 43
End: 2023-11-08
Payer: COMMERCIAL

## 2023-11-08 PROCEDURE — 90832 PSYTX W PT 30 MINUTES: CPT | Mod: GT

## 2023-11-09 ENCOUNTER — APPOINTMENT (OUTPATIENT)
Dept: ORTHOPEDIC SURGERY | Facility: CLINIC | Age: 43
End: 2023-11-09
Payer: COMMERCIAL

## 2023-11-09 VITALS — WEIGHT: 218 LBS | BODY MASS INDEX: 38.62 KG/M2 | HEIGHT: 63 IN

## 2023-11-09 PROCEDURE — 99214 OFFICE O/P EST MOD 30 MIN: CPT

## 2023-11-13 ENCOUNTER — APPOINTMENT (OUTPATIENT)
Dept: NEUROLOGY | Facility: CLINIC | Age: 43
End: 2023-11-13
Payer: COMMERCIAL

## 2023-11-13 ENCOUNTER — APPOINTMENT (OUTPATIENT)
Dept: ORTHOPEDIC SURGERY | Facility: CLINIC | Age: 43
End: 2023-11-13

## 2023-11-13 PROCEDURE — 95910 NRV CNDJ TEST 7-8 STUDIES: CPT

## 2023-11-13 PROCEDURE — 95886 MUSC TEST DONE W/N TEST COMP: CPT

## 2023-11-21 ENCOUNTER — RX RENEWAL (OUTPATIENT)
Age: 43
End: 2023-11-21

## 2023-11-21 ENCOUNTER — APPOINTMENT (OUTPATIENT)
Dept: PSYCHIATRY | Facility: CLINIC | Age: 43
End: 2023-11-21
Payer: COMMERCIAL

## 2023-11-21 PROCEDURE — 99214 OFFICE O/P EST MOD 30 MIN: CPT | Mod: 95

## 2023-11-21 PROCEDURE — 90833 PSYTX W PT W E/M 30 MIN: CPT | Mod: GT

## 2023-11-22 ENCOUNTER — APPOINTMENT (OUTPATIENT)
Dept: PSYCHIATRY | Facility: CLINIC | Age: 43
End: 2023-11-22
Payer: COMMERCIAL

## 2023-11-22 PROCEDURE — 90837 PSYTX W PT 60 MINUTES: CPT | Mod: GT

## 2023-11-27 ENCOUNTER — APPOINTMENT (OUTPATIENT)
Dept: ORTHOPEDIC SURGERY | Facility: CLINIC | Age: 43
End: 2023-11-27
Payer: COMMERCIAL

## 2023-11-27 DIAGNOSIS — G56.01 CARPAL TUNNEL SYNDROME, RIGHT UPPER LIMB: ICD-10-CM

## 2023-11-27 PROCEDURE — 99213 OFFICE O/P EST LOW 20 MIN: CPT

## 2023-11-29 ENCOUNTER — APPOINTMENT (OUTPATIENT)
Dept: PSYCHIATRY | Facility: CLINIC | Age: 43
End: 2023-11-29

## 2023-11-30 ENCOUNTER — APPOINTMENT (OUTPATIENT)
Dept: ORTHOPEDIC SURGERY | Facility: CLINIC | Age: 43
End: 2023-11-30

## 2023-12-04 ENCOUNTER — APPOINTMENT (OUTPATIENT)
Dept: NEUROLOGY | Facility: CLINIC | Age: 43
End: 2023-12-04
Payer: COMMERCIAL

## 2023-12-04 ENCOUNTER — APPOINTMENT (OUTPATIENT)
Dept: FAMILY MEDICINE | Facility: CLINIC | Age: 43
End: 2023-12-04
Payer: COMMERCIAL

## 2023-12-04 ENCOUNTER — APPOINTMENT (OUTPATIENT)
Dept: ORTHOPEDIC SURGERY | Facility: CLINIC | Age: 43
End: 2023-12-04

## 2023-12-04 VITALS
TEMPERATURE: 97.6 F | BODY MASS INDEX: 36.32 KG/M2 | DIASTOLIC BLOOD PRESSURE: 90 MMHG | HEART RATE: 76 BPM | HEIGHT: 63 IN | OXYGEN SATURATION: 98 % | SYSTOLIC BLOOD PRESSURE: 130 MMHG | WEIGHT: 205 LBS

## 2023-12-04 DIAGNOSIS — Z00.00 ENCOUNTER FOR GENERAL ADULT MEDICAL EXAMINATION W/OUT ABNORMAL FINDINGS: ICD-10-CM

## 2023-12-04 DIAGNOSIS — M62.838 OTHER MUSCLE SPASM: ICD-10-CM

## 2023-12-04 PROCEDURE — 99396 PREV VISIT EST AGE 40-64: CPT | Mod: 25

## 2023-12-04 PROCEDURE — 36415 COLL VENOUS BLD VENIPUNCTURE: CPT

## 2023-12-04 PROCEDURE — 95911 NRV CNDJ TEST 9-10 STUDIES: CPT

## 2023-12-04 PROCEDURE — 95886 MUSC TEST DONE W/N TEST COMP: CPT

## 2023-12-04 PROCEDURE — 81003 URINALYSIS AUTO W/O SCOPE: CPT | Mod: QW

## 2023-12-04 RX ORDER — CYCLOBENZAPRINE HYDROCHLORIDE 5 MG/1
5 TABLET, FILM COATED ORAL
Qty: 30 | Refills: 0 | Status: ACTIVE | COMMUNITY
Start: 2023-12-04 | End: 1900-01-01

## 2023-12-04 RX ORDER — MELOXICAM 7.5 MG/1
7.5 TABLET ORAL
Qty: 60 | Refills: 0 | Status: ACTIVE | COMMUNITY
Start: 2023-12-04 | End: 1900-01-01

## 2023-12-05 ENCOUNTER — APPOINTMENT (OUTPATIENT)
Age: 43
End: 2023-12-05

## 2023-12-05 LAB
25(OH)D3 SERPL-MCNC: 16.4 NG/ML
ALBUMIN SERPL ELPH-MCNC: 3.9 G/DL
ALP BLD-CCNC: 110 U/L
ALT SERPL-CCNC: 12 U/L
ANION GAP SERPL CALC-SCNC: 11 MMOL/L
APPEARANCE: CLEAR
AST SERPL-CCNC: 14 U/L
BACTERIA: ABNORMAL /HPF
BASOPHILS # BLD AUTO: 0.03 K/UL
BASOPHILS NFR BLD AUTO: 0.5 %
BILIRUB SERPL-MCNC: 0.4 MG/DL
BILIRUBIN URINE: NEGATIVE
BLOOD URINE: NEGATIVE
BUN SERPL-MCNC: 6 MG/DL
CALCIUM SERPL-MCNC: 9 MG/DL
CAST: 1 /LPF
CHLORIDE SERPL-SCNC: 101 MMOL/L
CHOLEST SERPL-MCNC: 195 MG/DL
CO2 SERPL-SCNC: 29 MMOL/L
COLOR: YELLOW
CREAT SERPL-MCNC: 0.62 MG/DL
CREAT SPEC-SCNC: 249 MG/DL
CREAT/PROT UR: 0.1 RATIO
EGFR: 113 ML/MIN/1.73M2
EOSINOPHIL # BLD AUTO: 0.12 K/UL
EOSINOPHIL NFR BLD AUTO: 2.1 %
EPITHELIAL CELLS: 15 /HPF
ESTIMATED AVERAGE GLUCOSE: 108 MG/DL
FERRITIN SERPL-MCNC: 18 NG/ML
FOLATE SERPL-MCNC: 7.2 NG/ML
GLUCOSE QUALITATIVE U: NEGATIVE MG/DL
GLUCOSE SERPL-MCNC: 82 MG/DL
HBA1C MFR BLD HPLC: 5.4 %
HCT VFR BLD CALC: 39.2 %
HDLC SERPL-MCNC: 74 MG/DL
HGB BLD-MCNC: 12.1 G/DL
IMM GRANULOCYTES NFR BLD AUTO: 0.2 %
IRON SATN MFR SERPL: 7 %
IRON SERPL-MCNC: 26 UG/DL
KETONES URINE: ABNORMAL MG/DL
LDLC SERPL CALC-MCNC: 104 MG/DL
LEUKOCYTE ESTERASE URINE: ABNORMAL
LYMPHOCYTES # BLD AUTO: 1.42 K/UL
LYMPHOCYTES NFR BLD AUTO: 24.6 %
MAN DIFF?: NORMAL
MCHC RBC-ENTMCNC: 26.1 PG
MCHC RBC-ENTMCNC: 30.9 GM/DL
MCV RBC AUTO: 84.5 FL
MICROSCOPIC-UA: NORMAL
MONOCYTES # BLD AUTO: 0.7 K/UL
MONOCYTES NFR BLD AUTO: 12.1 %
NEUTROPHILS # BLD AUTO: 3.5 K/UL
NEUTROPHILS NFR BLD AUTO: 60.5 %
NITRITE URINE: NEGATIVE
NONHDLC SERPL-MCNC: 121 MG/DL
PH URINE: 6.5
PLATELET # BLD AUTO: 375 K/UL
POTASSIUM SERPL-SCNC: 4 MMOL/L
PROT SERPL-MCNC: 6.8 G/DL
PROT UR-MCNC: 18 MG/DL
PROTEIN URINE: NORMAL MG/DL
RBC # BLD: 4.64 M/UL
RBC # FLD: 14.9 %
RED BLOOD CELLS URINE: 1 /HPF
SODIUM SERPL-SCNC: 141 MMOL/L
SPECIFIC GRAVITY URINE: 1.02
TIBC SERPL-MCNC: 394 UG/DL
TRIGL SERPL-MCNC: 94 MG/DL
TSH SERPL-ACNC: 1.39 UIU/ML
UIBC SERPL-MCNC: 368 UG/DL
URATE SERPL-MCNC: 4.3 MG/DL
UROBILINOGEN URINE: 1 MG/DL
VIT B12 SERPL-MCNC: 241 PG/ML
WBC # FLD AUTO: 5.78 K/UL
WHITE BLOOD CELLS URINE: 4 /HPF

## 2023-12-05 RX ORDER — ERGOCALCIFEROL 1.25 MG/1
1.25 MG CAPSULE, LIQUID FILLED ORAL
Qty: 12 | Refills: 0 | Status: ACTIVE | COMMUNITY
Start: 2023-12-05 | End: 1900-01-01

## 2023-12-06 ENCOUNTER — RX RENEWAL (OUTPATIENT)
Age: 43
End: 2023-12-06

## 2023-12-07 ENCOUNTER — APPOINTMENT (OUTPATIENT)
Dept: ORTHOPEDIC SURGERY | Facility: CLINIC | Age: 43
End: 2023-12-07
Payer: COMMERCIAL

## 2023-12-07 VITALS — WEIGHT: 205 LBS | HEIGHT: 63 IN | BODY MASS INDEX: 36.32 KG/M2

## 2023-12-07 DIAGNOSIS — G56.21 LESION OF ULNAR NERVE, RIGHT UPPER LIMB: ICD-10-CM

## 2023-12-07 PROCEDURE — 99214 OFFICE O/P EST MOD 30 MIN: CPT

## 2023-12-07 RX ORDER — CYANOCOBALAMIN 1000 UG/ML
1000 INJECTION INTRAMUSCULAR; SUBCUTANEOUS
Refills: 0 | Status: COMPLETED | OUTPATIENT
Start: 2023-12-07 | End: 1900-01-01

## 2023-12-08 ENCOUNTER — MED ADMIN CHARGE (OUTPATIENT)
Age: 43
End: 2023-12-08

## 2023-12-08 ENCOUNTER — APPOINTMENT (OUTPATIENT)
Dept: FAMILY MEDICINE | Facility: CLINIC | Age: 43
End: 2023-12-08
Payer: COMMERCIAL

## 2023-12-08 PROCEDURE — 96372 THER/PROPH/DIAG INJ SC/IM: CPT

## 2023-12-08 RX ORDER — CYANOCOBALAMIN 1000 UG/ML
1000 INJECTION INTRAMUSCULAR; SUBCUTANEOUS
Qty: 1 | Refills: 0 | Status: COMPLETED | OUTPATIENT
Start: 2023-12-07

## 2023-12-09 ENCOUNTER — APPOINTMENT (OUTPATIENT)
Dept: ORTHOPEDIC SURGERY | Facility: CLINIC | Age: 43
End: 2023-12-09
Payer: COMMERCIAL

## 2023-12-09 PROCEDURE — 99214 OFFICE O/P EST MOD 30 MIN: CPT

## 2023-12-12 ENCOUNTER — APPOINTMENT (OUTPATIENT)
Dept: OBGYN | Facility: CLINIC | Age: 43
End: 2023-12-12
Payer: COMMERCIAL

## 2023-12-12 VITALS
DIASTOLIC BLOOD PRESSURE: 80 MMHG | BODY MASS INDEX: 36.32 KG/M2 | SYSTOLIC BLOOD PRESSURE: 125 MMHG | HEIGHT: 63 IN | WEIGHT: 205 LBS

## 2023-12-12 DIAGNOSIS — Z01.419 ENCOUNTER FOR GYNECOLOGICAL EXAMINATION (GENERAL) (ROUTINE) W/OUT ABNORMAL FINDINGS: ICD-10-CM

## 2023-12-12 PROCEDURE — 99396 PREV VISIT EST AGE 40-64: CPT

## 2023-12-12 NOTE — HISTORY OF PRESENT ILLNESS
[FreeTextEntry1] : 43 year old female presents for wwe.  She has no complaints today.  Menses are every 28 days, lasting about 7 days.  She has a Paragard IUD that was placed in 2019.  She is happy with the IUD.  She has a history of an abnormal pap.  She is up to date with her screening colonoscopy.    She has an extensive past medical history that is significant for a stroke, DM, HTN, elevated cholesterol and anemia.  PSH includes and angioplasty of the brain, CS, tonsillectomy, cholecystectomy, ankle surgery and gastric sleeve.  Family history is significant for a maternal grandmother with breast cancer under the age of 50.  Her paternal grandmother also had breast cancer.  Her mother was also diagnosed with colon cancer at age 53.  Her father had pancreatic cancer.  She had MyRisk testing which was negative.  She does have an elevated lifetime risk of breast cancer at 26%.  She is a  (etop x2).  She is allergic to ceclor.  She takes ramipril, atorvastatin, metformin, lamotrigine and ozempic.

## 2023-12-12 NOTE — PLAN
[FreeTextEntry1] : 43 year old female wwe  1. Pap done 2. Rx screening mammo 3. Family history of breast, colon and pancreatic cancer.  MyRisk testing was negative.  She is up-to-date with her screening colonoscopy.  She has an elevated lifetime risk of breast cancer at 26%.  We discussed that she qualifies with biannual or surveillance, mammograms staggered with MRI every 6 months.  She is interested in moving forward with this screening.  We also discussed the importance of monthly self breast exams at home and clinical breast exams every 6 to 12 months.  She will return to the office in 6 months for a breast exam and MRI prescription. 4.  ParaGard IUD good until 2029 5. Annual exam in 1 year

## 2023-12-13 ENCOUNTER — APPOINTMENT (OUTPATIENT)
Dept: PSYCHIATRY | Facility: CLINIC | Age: 43
End: 2023-12-13
Payer: COMMERCIAL

## 2023-12-13 PROCEDURE — 90837 PSYTX W PT 60 MINUTES: CPT | Mod: GT

## 2023-12-13 NOTE — REASON FOR VISIT
[Patient preference] : as per patient preference [Telehealth (audio & video) - Individual/Group] : This visit was provided via telehealth using real-time 2-way audio visual technology. [Medical Office: ([facility])___] : The provider was located at the medical office in [unfilled]. [Home] : The patient, [unfilled], was located at home, [unfilled], at the time of the visit. [Verbal consent obtained from patient/other participant(s)] : Verbal consent for telehealth/telephonic services obtained from patient/other participant(s) [Patient] : Patient [FreeTextEntry4] : 10:03 am [FreeTextEntry5] : 11:03 am [FreeTextEntry1] : [place] is presenting today for a follow up psychotherapy session.
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
## 2023-12-13 NOTE — PHYSICAL EXAM
[Well groomed] : well groomed [Cooperative] : cooperative [Full] : full [Clear] : clear [Linear/Goal Directed] : linear/goal directed [None] : none [None Reported] : none reported [Average] : average [WNL] : within normal limits [Depressed] : depressed [Anxious] : anxious

## 2023-12-13 NOTE — END OF VISIT
[Duration of Psychotherapy Visit (minutes spent in synchronous communication): ____] : Duration of Psychotherapy Visit (minutes spent in synchronous communication): [unfilled] [Individual Psychotherapy for 53+ minutes] : Individual Psychotherapy for 53+ minutes  [Teletherapy Service Provided] : The services provided in this session were delivered via tele-therapy [Licensed Clinician] : Licensed Clinician [FreeTextEntry3] : [place], [place] [FreeTextEntry5] : [facility] [place] [address], [address], [place] [address]

## 2023-12-15 ENCOUNTER — APPOINTMENT (OUTPATIENT)
Dept: ORTHOPEDIC SURGERY | Facility: CLINIC | Age: 43
End: 2023-12-15
Payer: COMMERCIAL

## 2023-12-15 ENCOUNTER — APPOINTMENT (OUTPATIENT)
Dept: FAMILY MEDICINE | Facility: CLINIC | Age: 43
End: 2023-12-15
Payer: COMMERCIAL

## 2023-12-15 DIAGNOSIS — S39.012A STRAIN OF MUSCLE, FASCIA AND TENDON OF LOWER BACK, INITIAL ENCOUNTER: ICD-10-CM

## 2023-12-15 PROCEDURE — 96372 THER/PROPH/DIAG INJ SC/IM: CPT

## 2023-12-15 PROCEDURE — 99214 OFFICE O/P EST MOD 30 MIN: CPT

## 2023-12-15 RX ORDER — CYANOCOBALAMIN 1000 UG/ML
1000 INJECTION INTRAMUSCULAR; SUBCUTANEOUS
Qty: 1 | Refills: 0 | Status: COMPLETED | OUTPATIENT
Start: 2023-12-09

## 2023-12-15 RX ORDER — MELOXICAM 15 MG/1
15 TABLET ORAL
Qty: 30 | Refills: 1 | Status: ACTIVE | COMMUNITY
Start: 2023-12-15 | End: 1900-01-01

## 2023-12-15 NOTE — PHYSICAL EXAM
[Normal Coordination] : normal coordination [Normal DTR UE/LE] : normal DTR UE/LE  [Normal Sensation] : normal sensation [Normal Mood and Affect] : normal mood and affect [Orientated] : orientated [Able to Communicate] : able to communicate [Normal Skin] : normal skin [No Rash] : no rash [No Ulcers] : no ulcers [No Lesions] : no lesions [No obvious lymphadenopathy in areas examined] : no obvious lymphadenopathy in areas examined [Well Developed] : well developed [Peripheral vascular exam is grossly normal] : peripheral vascular exam is grossly normal [No Respiratory Distress] : no respiratory distress [Flexion] : flexion [Extension] : extension [] : mildly antalgic
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
## 2023-12-17 NOTE — DATA REVIEWED
[I independently reviewed and interpreted images and report] : I independently reviewed and interpreted images and report [FreeTextEntry1] : I stop paperwork reviewed PT progress notes reviewed

## 2023-12-17 NOTE — DISCUSSION/SUMMARY
[de-identified] : I advised the patient to continue her current PT trial, and to continue implementing the exercises they're teaching her into a daily routine. I am requesting a lumbar spine MRI to evaluate for hnp, patient experiencing persistent back and buttock pain despite treatment with muscle rleaxer and physical therapy > 6 wks duration. Possible LESI pending results of MRI. Prescribed Meloxicam, c/t muscle relaxer as prescribed. Follow up once the MRI is complete.   Prior to appointment and during encounter with patient extensive medical records were reviewed including but not limited to, hospital records, outpatient records, imaging results, and lab data.During this appointment the patient was examined, diagnoses were discussed and explained in a face to face manner. In addition extensive time was spent reviewing aforementioned diagnostic studies. Counseling including abnormal image results, differential diagnoses, treatment options, risk and benefits, lifestyle changes, current condition, and current medications was performed. Patient's comments, questions, and concerns were addressed and patient verbalized understanding. Based on this patient's presentation at our office, which is an orthopedic spine surgeon's office, this patient inherently / intrinsically has a risk, however minute, of developing issues such as Cauda equina syndrome, bowel and bladder changes, or progression of motor or neurological deficits such as paralysis which may be permanent.  CHELSEA GORDILLO Acting as a Scribe for Dr. Howard HERRERA, Chelsea Gordillo, attest that this documentation has been prepared under the direction and in the presence of Provider Michael Lawrence MD.

## 2023-12-18 LAB
CYTOLOGY CVX/VAG DOC THIN PREP: NORMAL
HPV HIGH+LOW RISK DNA PNL CVX: NOT DETECTED

## 2023-12-19 ENCOUNTER — APPOINTMENT (OUTPATIENT)
Dept: MRI IMAGING | Facility: CLINIC | Age: 43
End: 2023-12-19
Payer: COMMERCIAL

## 2023-12-19 PROCEDURE — 72148 MRI LUMBAR SPINE W/O DYE: CPT

## 2023-12-19 PROCEDURE — 72146 MRI CHEST SPINE W/O DYE: CPT

## 2023-12-20 ENCOUNTER — APPOINTMENT (OUTPATIENT)
Dept: PSYCHIATRY | Facility: CLINIC | Age: 43
End: 2023-12-20
Payer: COMMERCIAL

## 2023-12-20 PROCEDURE — 90837 PSYTX W PT 60 MINUTES: CPT | Mod: GT

## 2023-12-20 NOTE — PHYSICAL EXAM
[Well groomed] : well groomed [Cooperative] : cooperative [Depressed] : depressed [Anxious] : anxious [Full] : full [Clear] : clear [Linear/Goal Directed] : linear/goal directed [None] : none [None Reported] : none reported [Average] : average [WNL] : within normal limits
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
## 2023-12-20 NOTE — REASON FOR VISIT
[Patient preference] : as per patient preference [Telehealth (audio & video) - Individual/Group] : This visit was provided via telehealth using real-time 2-way audio visual technology. [Medical Office: ([facility])___] : The provider was located at the medical office in [unfilled]. [Home] : The patient, [unfilled], was located at home, [unfilled], at the time of the visit. [Verbal consent obtained from patient/other participant(s)] : Verbal consent for telehealth/telephonic services obtained from patient/other participant(s) [Patient] : Patient [FreeTextEntry4] : 10:03 am [FreeTextEntry5] : 11:02 am [FreeTextEntry1] : [place] is presenting today for a follow up psychotherapy session.

## 2023-12-20 NOTE — END OF VISIT
[Duration of Psychotherapy Visit (minutes spent in synchronous communication): ____] : Duration of Psychotherapy Visit (minutes spent in synchronous communication): [unfilled] [Individual Psychotherapy for 53+ minutes] : Individual Psychotherapy for 53+ minutes  [Teletherapy Service Provided] : The services provided in this session were delivered via tele-therapy [Licensed Clinician] : Licensed Clinician [FreeTextEntry3] : [place], [place] [FreeTextEntry5] : [facility] [place] [address], [address], [place] [address]

## 2023-12-21 ENCOUNTER — RX RENEWAL (OUTPATIENT)
Age: 43
End: 2023-12-21

## 2023-12-22 ENCOUNTER — APPOINTMENT (OUTPATIENT)
Dept: FAMILY MEDICINE | Facility: CLINIC | Age: 43
End: 2023-12-22
Payer: COMMERCIAL

## 2023-12-22 PROCEDURE — 96372 THER/PROPH/DIAG INJ SC/IM: CPT

## 2023-12-22 RX ORDER — CYANOCOBALAMIN 1000 UG/ML
1000 INJECTION INTRAMUSCULAR; SUBCUTANEOUS
Qty: 1 | Refills: 0 | Status: COMPLETED | OUTPATIENT
Start: 2023-12-16

## 2023-12-25 ENCOUNTER — NON-APPOINTMENT (OUTPATIENT)
Age: 43
End: 2023-12-25

## 2023-12-26 ENCOUNTER — NON-APPOINTMENT (OUTPATIENT)
Age: 43
End: 2023-12-26

## 2023-12-28 ENCOUNTER — RX RENEWAL (OUTPATIENT)
Age: 43
End: 2023-12-28

## 2023-12-29 ENCOUNTER — APPOINTMENT (OUTPATIENT)
Dept: FAMILY MEDICINE | Facility: CLINIC | Age: 43
End: 2023-12-29
Payer: COMMERCIAL

## 2023-12-29 PROCEDURE — 96372 THER/PROPH/DIAG INJ SC/IM: CPT

## 2023-12-29 RX ORDER — CYANOCOBALAMIN 1000 UG/ML
1000 INJECTION INTRAMUSCULAR; SUBCUTANEOUS
Qty: 1 | Refills: 0 | Status: COMPLETED | OUTPATIENT
Start: 2023-12-23

## 2024-01-03 ENCOUNTER — APPOINTMENT (OUTPATIENT)
Dept: PSYCHIATRY | Facility: CLINIC | Age: 44
End: 2024-01-03
Payer: COMMERCIAL

## 2024-01-03 ENCOUNTER — APPOINTMENT (OUTPATIENT)
Dept: ORTHOPEDIC SURGERY | Facility: CLINIC | Age: 44
End: 2024-01-03
Payer: COMMERCIAL

## 2024-01-03 DIAGNOSIS — M47.814 SPONDYLOSIS W/OUT MYELOPATHY OR RADICULOPATHY, THORACIC REGION: ICD-10-CM

## 2024-01-03 DIAGNOSIS — M54.16 RADICULOPATHY, LUMBAR REGION: ICD-10-CM

## 2024-01-03 PROCEDURE — 99214 OFFICE O/P EST MOD 30 MIN: CPT

## 2024-01-03 PROCEDURE — 90837 PSYTX W PT 60 MINUTES: CPT | Mod: GT

## 2024-01-03 RX ORDER — MELOXICAM 15 MG/1
15 TABLET ORAL DAILY
Qty: 30 | Refills: 1 | Status: ACTIVE | COMMUNITY
Start: 2024-01-03 | End: 1900-01-01

## 2024-01-03 NOTE — END OF VISIT
[Duration of Psychotherapy Visit (minutes spent in synchronous communication): ____] : Duration of Psychotherapy Visit (minutes spent in synchronous communication): [unfilled] [Individual Psychotherapy for 53+ minutes] : Individual Psychotherapy for 53+ minutes  [Teletherapy Service Provided] : The services provided in this session were delivered via tele-therapy [Licensed Clinician] : Licensed Clinician [FreeTextEntry3] : [place], [place] [FreeTextEntry5] : [facility] [place] [address], [address], [place] [address]

## 2024-01-03 NOTE — REASON FOR VISIT
[Patient preference] : as per patient preference [Telehealth (audio & video) - Individual/Group] : This visit was provided via telehealth using real-time 2-way audio visual technology. [Medical Office: ([facility])___] : The provider was located at the medical office in [unfilled]. [Home] : The patient, [unfilled], was located at home, [unfilled], at the time of the visit. [Verbal consent obtained from patient/other participant(s)] : Verbal consent for telehealth/telephonic services obtained from patient/other participant(s) [Patient] : Patient [FreeTextEntry4] : 10:05 am [FreeTextEntry5] : 11:00 am [FreeTextEntry1] : [place] is presenting today for a follow up psychotherapy session.
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
## 2024-01-05 ENCOUNTER — APPOINTMENT (OUTPATIENT)
Dept: FAMILY MEDICINE | Facility: CLINIC | Age: 44
End: 2024-01-05
Payer: COMMERCIAL

## 2024-01-05 DIAGNOSIS — E53.8 DEFICIENCY OF OTHER SPECIFIED B GROUP VITAMINS: ICD-10-CM

## 2024-01-05 PROBLEM — M47.814 THORACIC SPONDYLOSIS: Status: ACTIVE | Noted: 2024-01-05

## 2024-01-05 PROCEDURE — 96372 THER/PROPH/DIAG INJ SC/IM: CPT

## 2024-01-05 PROCEDURE — 36415 COLL VENOUS BLD VENIPUNCTURE: CPT

## 2024-01-05 RX ADMIN — CYANOCOBALAMIN 1 MCG/ML: 1000 INJECTION, SOLUTION INTRAMUSCULAR at 00:00

## 2024-01-05 NOTE — DATA REVIEWED
Chart reviewed.  Agree with plan.  Mook Ramos MD 1/30/2020 3:10 PM   [Lumbar Spine] : lumbar spine [MRI] : MRI [Thoracic Spine] : thoracic spine [Report was reviewed and noted in the chart] : The report was reviewed and noted in the chart [I independently reviewed and interpreted images and report] : I independently reviewed and interpreted images and report [I reviewed the films/CD and additionally noted] : I reviewed the films/CD and additionally noted [FreeTextEntry2] : No nerve or spinal cord compression. Lower thoracic disc protrusion.   [FreeTextEntry1] : I stop paperwork reviewed PT progress notes reviewed

## 2024-01-05 NOTE — DISCUSSION/SUMMARY
[de-identified] : We discussed the results of MRI studies conducted on the lumbar and thoracic spine, which did not reveal any surgical pathology. Currently, the plan is for her to persist with her ongoing physical therapy, which appears to be offering relief, along with the use of NSAIDs on an as-needed basis. Renewal for PT issued.   Prior to appointment and during encounter with patient extensive medical records were reviewed including but not limited to, hospital records, outpatient records, imaging results, and lab data.During this appointment the patient was examined, diagnoses were discussed and explained in a face to face manner. In addition extensive time was spent reviewing aforementioned diagnostic studies. Counseling including abnormal image results, differential diagnoses, treatment options, risk and benefits, lifestyle changes, current condition, and current medications was performed. Patient's comments, questions, and concerns were addressed and patient verbalized understanding. Based on this patient's presentation at our office, which is an orthopedic spine surgeon's office, this patient inherently / intrinsically has a risk, however minute, of developing issues such as Cauda equina syndrome, bowel and bladder changes, or progression of motor or neurological deficits such as paralysis which may be permanent.  CHELSEA AGUILLON Acting as a Scribe for Chelsea Alfred, attest that this documentation has been prepared under the direction and in the presence of Provider Michael Lawrence MD.
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
## 2024-01-07 LAB
ALBUMIN SERPL ELPH-MCNC: 4.1 G/DL
ALP BLD-CCNC: 118 U/L
ALT SERPL-CCNC: 12 U/L
ANION GAP SERPL CALC-SCNC: 11 MMOL/L
AST SERPL-CCNC: 13 U/L
BASOPHILS # BLD AUTO: 0.04 K/UL
BASOPHILS NFR BLD AUTO: 0.6 %
BILIRUB SERPL-MCNC: 0.3 MG/DL
BUN SERPL-MCNC: 8 MG/DL
CALCIUM SERPL-MCNC: 9.2 MG/DL
CHLORIDE SERPL-SCNC: 102 MMOL/L
CO2 SERPL-SCNC: 26 MMOL/L
CREAT SERPL-MCNC: 0.59 MG/DL
EGFR: 114 ML/MIN/1.73M2
EOSINOPHIL # BLD AUTO: 0.15 K/UL
EOSINOPHIL NFR BLD AUTO: 2.3 %
FOLATE SERPL-MCNC: 7.3 NG/ML
GLUCOSE SERPL-MCNC: 75 MG/DL
HCT VFR BLD CALC: 39.6 %
HGB BLD-MCNC: 12.6 G/DL
IMM GRANULOCYTES NFR BLD AUTO: 0.3 %
LYMPHOCYTES # BLD AUTO: 1.58 K/UL
LYMPHOCYTES NFR BLD AUTO: 24.3 %
MAN DIFF?: NORMAL
MCHC RBC-ENTMCNC: 26.5 PG
MCHC RBC-ENTMCNC: 31.8 GM/DL
MCV RBC AUTO: 83.2 FL
MONOCYTES # BLD AUTO: 0.58 K/UL
MONOCYTES NFR BLD AUTO: 8.9 %
NEUTROPHILS # BLD AUTO: 4.12 K/UL
NEUTROPHILS NFR BLD AUTO: 63.6 %
PLATELET # BLD AUTO: 386 K/UL
POTASSIUM SERPL-SCNC: 4.1 MMOL/L
PROT SERPL-MCNC: 6.8 G/DL
RBC # BLD: 4.76 M/UL
RBC # FLD: 13.9 %
SODIUM SERPL-SCNC: 140 MMOL/L
VIT B12 SERPL-MCNC: 709 PG/ML
WBC # FLD AUTO: 6.49 K/UL

## 2024-01-08 ENCOUNTER — RX RENEWAL (OUTPATIENT)
Age: 44
End: 2024-01-08

## 2024-01-08 RX ORDER — CYANOCOBALAMIN 1000 UG/ML
1000 INJECTION INTRAMUSCULAR; SUBCUTANEOUS
Qty: 0 | Refills: 0 | Status: COMPLETED | OUTPATIENT
Start: 2024-01-05

## 2024-01-10 ENCOUNTER — NON-APPOINTMENT (OUTPATIENT)
Age: 44
End: 2024-01-10

## 2024-01-10 ENCOUNTER — APPOINTMENT (OUTPATIENT)
Dept: FAMILY MEDICINE | Facility: CLINIC | Age: 44
End: 2024-01-10
Payer: COMMERCIAL

## 2024-01-10 ENCOUNTER — APPOINTMENT (OUTPATIENT)
Dept: PSYCHIATRY | Facility: CLINIC | Age: 44
End: 2024-01-10
Payer: COMMERCIAL

## 2024-01-10 VITALS
SYSTOLIC BLOOD PRESSURE: 140 MMHG | OXYGEN SATURATION: 99 % | DIASTOLIC BLOOD PRESSURE: 90 MMHG | HEART RATE: 104 BPM | WEIGHT: 202 LBS | BODY MASS INDEX: 35.79 KG/M2 | HEIGHT: 63 IN

## 2024-01-10 VITALS — SYSTOLIC BLOOD PRESSURE: 136 MMHG | DIASTOLIC BLOOD PRESSURE: 92 MMHG

## 2024-01-10 DIAGNOSIS — Z01.818 ENCOUNTER FOR OTHER PREPROCEDURAL EXAMINATION: ICD-10-CM

## 2024-01-10 PROCEDURE — 93000 ELECTROCARDIOGRAM COMPLETE: CPT

## 2024-01-10 PROCEDURE — 90837 PSYTX W PT 60 MINUTES: CPT | Mod: GT

## 2024-01-10 PROCEDURE — 99214 OFFICE O/P EST MOD 30 MIN: CPT | Mod: 25

## 2024-01-10 RX ORDER — METFORMIN HYDROCHLORIDE 500 MG/1
500 TABLET, COATED ORAL
Qty: 90 | Refills: 1 | Status: DISCONTINUED | COMMUNITY
Start: 1900-01-01 | End: 2024-01-10

## 2024-01-10 NOTE — ASSESSMENT
[Patient Optimized for Surgery] : Patient optimized for surgery [No Further Testing Recommended] : no further testing recommended [Modify anticoagulant treatment prior to procedure] : Modify anticoagulant treatment prior to procedure [Modify anti-platelet treatment prior to procedure] : Modify anti-platelet treatment prior to procedure [Modify medications prior to procedure] : Modify medications prior to procedure [As per surgery] : as per surgery [FreeTextEntry4] : A 43-year-old female presents today for a pre-op visit regarding a Left elbow ulnar nerve decompression on 01/18/2024 with Dr. Marshall Saenz. Mood is good. Pt is currently taking Alprazolam 0.25mg as needed, Amlodipine 10mg, Aspirin 325mg, Atorvastatin 40mg, Cyanocobalamin 1000mcg/ml, Ecotrin 325, lamotrigine 25mg, Meloxicam 15mg, Metformin 500mg, Ozempic 1.0mg/ml, Ramipril 10mg, Venlafaxine ER 225mg, and Vitamin D.  No contraindications.

## 2024-01-10 NOTE — HISTORY OF PRESENT ILLNESS
[No Pertinent Cardiac History] : no history of aortic stenosis, atrial fibrillation, coronary artery disease, recent myocardial infarction, or implantable device/pacemaker [No Pertinent Pulmonary History] : no history of asthma, COPD, sleep apnea, or smoking [No Adverse Anesthesia Reaction] : no adverse anesthesia reaction in self or family member [(Patient denies any chest pain, claudication, dyspnea on exertion, orthopnea, palpitations or syncope)] : Patient denies any chest pain, claudication, dyspnea on exertion, orthopnea, palpitations or syncope [Chronic Anticoagulation] : no chronic anticoagulation [Chronic Kidney Disease] : no chronic kidney disease [Diabetes] : no diabetes [FreeTextEntry1] : Left elbow ulnar nerve decompression [FreeTextEntry2] : 01/18/2024 [FreeTextEntry3] : Dr. Marshall Saenz  [FreeTextEntry4] : A 43-year-old female presents today for a pre-op visit regarding a Left elbow ulnar nerve decompression on 01/18/2024 with Dr. Marshall Saenz. Mood is good. Pt is currently taking Alprazolam 0.25mg as needed, Amlodipine 10mg, Aspirin 325mg, Atorvastatin 40mg, Cyanocobalamin 1000mcg/ml, Ecotrin 325, lamotrigine 25mg, Meloxicam 15mg, Metformin 500mg, Ozempic 1.0mg/ml, Ramipril 10mg, Venlafaxine ER 225mg, and Vitamin D. Pt c/o slight tingling in her left elbow.

## 2024-01-10 NOTE — END OF VISIT
[Duration of Psychotherapy Visit (minutes spent in synchronous communication): ____] : Duration of Psychotherapy Visit (minutes spent in synchronous communication): [unfilled] [Individual Psychotherapy for 53+ minutes] : Individual Psychotherapy for 53+ minutes  [Teletherapy Service Provided] : The services provided in this session were delivered via tele-therapy [Licensed Clinician] : Licensed Clinician [FreeTextEntry3] : [place], [place] [FreeTextEntry5] : [facility] [place] [address], [address], [place] [address]
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
## 2024-01-10 NOTE — ADDENDUM
[FreeTextEntry1] : This note was written by Ayla Daniel, acting as the  for Dr. Maguire. This note accurately reflects the work and decisions made by Dr. Maguire.

## 2024-01-10 NOTE — REASON FOR VISIT
[Patient preference] : as per patient preference [Telehealth (audio & video) - Individual/Group] : This visit was provided via telehealth using real-time 2-way audio visual technology. [Medical Office: ([facility])___] : The provider was located at the medical office in [unfilled]. [Home] : The patient, [unfilled], was located at home, [unfilled], at the time of the visit. [Verbal consent obtained from patient/other participant(s)] : Verbal consent for telehealth/telephonic services obtained from patient/other participant(s) [Patient] : Patient [FreeTextEntry4] : 10:04 am [FreeTextEntry5] : 11:00 am [FreeTextEntry1] : [place] is presenting today for a follow up psychotherapy session.

## 2024-01-17 ENCOUNTER — APPOINTMENT (OUTPATIENT)
Dept: PSYCHIATRY | Facility: CLINIC | Age: 44
End: 2024-01-17
Payer: COMMERCIAL

## 2024-01-17 PROCEDURE — 90837 PSYTX W PT 60 MINUTES: CPT | Mod: GT

## 2024-01-17 NOTE — REASON FOR VISIT
[Patient preference] : as per patient preference [Telehealth (audio & video) - Individual/Group] : This visit was provided via telehealth using real-time 2-way audio visual technology. [Medical Office: ([facility])___] : The provider was located at the medical office in [unfilled]. [Home] : The patient, [unfilled], was located at home, [unfilled], at the time of the visit. [Verbal consent obtained from patient/other participant(s)] : Verbal consent for telehealth/telephonic services obtained from patient/other participant(s) [Patient] : Patient [FreeTextEntry4] : 10:00 am [FreeTextEntry5] : 11:00 am [FreeTextEntry1] : [place] is presenting today for a follow up psychotherapy session.

## 2024-01-17 NOTE — END OF VISIT
[Duration of Psychotherapy Visit (minutes spent in synchronous communication): ____] : Duration of Psychotherapy Visit (minutes spent in synchronous communication): [unfilled] [Individual Psychotherapy for 53+ minutes] : Individual Psychotherapy for 53+ minutes  [Teletherapy Service Provided] : The services provided in this session were delivered via tele-therapy [Licensed Clinician] : Licensed Clinician [FreeTextEntry3] : [place], [place] [FreeTextEntry5] : [facility] [place] [address], [address], [place] [address]
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
## 2024-01-18 ENCOUNTER — APPOINTMENT (OUTPATIENT)
Dept: ORTHOPEDIC SURGERY | Facility: AMBULATORY MEDICAL SERVICES | Age: 44
End: 2024-01-18
Payer: COMMERCIAL

## 2024-01-18 PROCEDURE — 64721 CARPAL TUNNEL SURGERY: CPT | Mod: LT

## 2024-01-18 PROCEDURE — 64718 REVISE ULNAR NERVE AT ELBOW: CPT | Mod: 59,LT

## 2024-01-18 RX ORDER — OXYCODONE 5 MG/1
5 TABLET ORAL
Qty: 5 | Refills: 0 | Status: ACTIVE | COMMUNITY
Start: 2024-01-18 | End: 1900-01-01

## 2024-01-24 ENCOUNTER — APPOINTMENT (OUTPATIENT)
Dept: PSYCHIATRY | Facility: CLINIC | Age: 44
End: 2024-01-24
Payer: COMMERCIAL

## 2024-01-24 PROCEDURE — 90837 PSYTX W PT 60 MINUTES: CPT | Mod: GT

## 2024-01-25 ENCOUNTER — APPOINTMENT (OUTPATIENT)
Dept: ORTHOPEDIC SURGERY | Facility: CLINIC | Age: 44
End: 2024-01-25
Payer: COMMERCIAL

## 2024-01-25 PROCEDURE — 99024 POSTOP FOLLOW-UP VISIT: CPT
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
## 2024-01-26 ENCOUNTER — RX RENEWAL (OUTPATIENT)
Age: 44
End: 2024-01-26

## 2024-01-29 ENCOUNTER — APPOINTMENT (OUTPATIENT)
Dept: ORTHOPEDIC SURGERY | Facility: CLINIC | Age: 44
End: 2024-01-29
Payer: COMMERCIAL

## 2024-01-29 VITALS — HEIGHT: 63 IN | BODY MASS INDEX: 35.79 KG/M2 | WEIGHT: 202 LBS

## 2024-01-29 PROCEDURE — 99024 POSTOP FOLLOW-UP VISIT: CPT

## 2024-01-29 NOTE — HISTORY OF PRESENT ILLNESS
[1] : 2 [0] : 0 [de-identified] : 43 year old female presenting 11 days s/p LT CTR LEFT ELBOW ULNAR NERVE DECOMPRESSION. DOS: 1/18/24  [] : no [FreeTextEntry6] : Pt feels like an "overuse" type of pain

## 2024-01-31 ENCOUNTER — APPOINTMENT (OUTPATIENT)
Dept: PSYCHIATRY | Facility: CLINIC | Age: 44
End: 2024-01-31
Payer: COMMERCIAL

## 2024-01-31 PROCEDURE — 90837 PSYTX W PT 60 MINUTES: CPT | Mod: GT
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
## 2024-01-31 NOTE — END OF VISIT
[Duration of Psychotherapy Visit (minutes spent in synchronous communication): ____] : Duration of Psychotherapy Visit (minutes spent in synchronous communication): [unfilled] [Individual Psychotherapy for 53+ minutes] : Individual Psychotherapy for 53+ minutes  [Teletherapy Service Provided] : The services provided in this session were delivered via tele-therapy [Licensed Clinician] : Licensed Clinician [FreeTextEntry3] : [place], [place] [FreeTextEntry5] : [facility] at [place] [address], [address], [place] [address]

## 2024-01-31 NOTE — END OF VISIT
[Duration of Psychotherapy Visit (minutes spent in synchronous communication): ____] : Duration of Psychotherapy Visit (minutes spent in synchronous communication): [unfilled] [Individual Psychotherapy for 53+ minutes] : Individual Psychotherapy for 53+ minutes  [Teletherapy Service Provided] : The services provided in this session were delivered via tele-therapy [Licensed Clinician] : Licensed Clinician [FreeTextEntry3] : [place], [place] [FreeTextEntry5] : [facility] at [place] [address], [address], [place] [address]
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
## 2024-01-31 NOTE — REASON FOR VISIT
[Patient preference] : as per patient preference [Telehealth (audio & video) - Individual/Group] : This visit was provided via telehealth using real-time 2-way audio visual technology. [Medical Office: ([facility])___] : The provider was located at the medical office in [unfilled]. [Home] : The patient, [unfilled], was located at home, [unfilled], at the time of the visit. [Verbal consent obtained from patient/other participant(s)] : Verbal consent for telehealth/telephonic services obtained from patient/other participant(s) [Patient] : Patient [FreeTextEntry4] : 10:02 am [FreeTextEntry5] : 11:00 am [FreeTextEntry1] : [place] is presenting today for a follow up psychotherapy session.

## 2024-01-31 NOTE — REASON FOR VISIT
[Patient preference] : as per patient preference [Telehealth (audio & video) - Individual/Group] : This visit was provided via telehealth using real-time 2-way audio visual technology. [Medical Office: ([facility])___] : The provider was located at the medical office in [unfilled]. [Home] : The patient, [unfilled], was located at home, [unfilled], at the time of the visit. [Verbal consent obtained from patient/other participant(s)] : Verbal consent for telehealth/telephonic services obtained from patient/other participant(s) [Patient] : Patient [FreeTextEntry4] : 10:01 am [FreeTextEntry5] : 11:00 am [FreeTextEntry1] : [place] is presenting today for a follow up psychotherapy session.

## 2024-02-02 ENCOUNTER — RX RENEWAL (OUTPATIENT)
Age: 44
End: 2024-02-02

## 2024-02-07 ENCOUNTER — APPOINTMENT (OUTPATIENT)
Dept: PSYCHIATRY | Facility: CLINIC | Age: 44
End: 2024-02-07
Payer: COMMERCIAL

## 2024-02-07 PROCEDURE — 90837 PSYTX W PT 60 MINUTES: CPT | Mod: GT

## 2024-02-07 NOTE — REASON FOR VISIT
[Patient preference] : as per patient preference [Telehealth (audio & video) - Individual/Group] : This visit was provided via telehealth using real-time 2-way audio visual technology. [Medical Office: ([facility])___] : The provider was located at the medical office in [unfilled]. [Home] : The patient, [unfilled], was located at home, [unfilled], at the time of the visit. [Verbal consent obtained from patient/other participant(s)] : Verbal consent for telehealth/telephonic services obtained from patient/other participant(s) [FreeTextEntry4] : 10:01 am [FreeTextEntry5] : 10:57 am [Patient] : Patient [FreeTextEntry1] : [place] is presenting today for a follow up psychotherapy session.

## 2024-02-07 NOTE — END OF VISIT
[Duration of Psychotherapy Visit (minutes spent in synchronous communication): ____] : Duration of Psychotherapy Visit (minutes spent in synchronous communication): [unfilled] [Individual Psychotherapy for 53+ minutes] : Individual Psychotherapy for 53+ minutes  [Teletherapy Service Provided] : The services provided in this session were delivered via tele-therapy [FreeTextEntry3] : [place], [place] [FreeTextEntry5] : [facility] at [facility] [address], [address], [place] [address] [Licensed Clinician] : Licensed Clinician
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
## 2024-02-14 ENCOUNTER — APPOINTMENT (OUTPATIENT)
Dept: ORTHOPEDIC SURGERY | Facility: CLINIC | Age: 44
End: 2024-02-14

## 2024-02-15 ENCOUNTER — APPOINTMENT (OUTPATIENT)
Dept: PSYCHIATRY | Facility: CLINIC | Age: 44
End: 2024-02-15
Payer: COMMERCIAL

## 2024-02-15 PROCEDURE — 90837 PSYTX W PT 60 MINUTES: CPT | Mod: 95
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
## 2024-02-15 NOTE — END OF VISIT
[Duration of Psychotherapy Visit (minutes spent in synchronous communication): ____] : Duration of Psychotherapy Visit (minutes spent in synchronous communication): [unfilled] [Individual Psychotherapy for 53+ minutes] : Individual Psychotherapy for 53+ minutes  [Teletherapy Service Provided] : The services provided in this session were delivered via tele-therapy [Licensed Clinician] : Licensed Clinician [FreeTextEntry3] : [place], [place] [FreeTextEntry5] : [facility] [place] [address], [address], [place] [address]

## 2024-02-15 NOTE — REASON FOR VISIT
[Patient preference] : as per patient preference [Telehealth (audio & video) - Individual/Group] : This visit was provided via telehealth using real-time 2-way audio visual technology. [Medical Office: ([facility])___] : The provider was located at the medical office in [unfilled]. [Home] : The patient, [unfilled], was located at home, [unfilled], at the time of the visit. [Verbal consent obtained from patient/other participant(s)] : Verbal consent for telehealth/telephonic services obtained from patient/other participant(s) [Patient] : Patient [FreeTextEntry4] : 9:02 am [FreeTextEntry5] : 9:56 am [FreeTextEntry1] : [place] is presenting today for a follow up psychotherapy session.

## 2024-02-20 ENCOUNTER — NON-APPOINTMENT (OUTPATIENT)
Age: 44
End: 2024-02-20

## 2024-02-21 ENCOUNTER — APPOINTMENT (OUTPATIENT)
Dept: PSYCHIATRY | Facility: CLINIC | Age: 44
End: 2024-02-21
Payer: COMMERCIAL

## 2024-02-21 PROCEDURE — 90837 PSYTX W PT 60 MINUTES: CPT | Mod: 95

## 2024-02-21 NOTE — REASON FOR VISIT
[Patient preference] : as per patient preference [Telehealth (audio & video) - Individual/Group] : This visit was provided via telehealth using real-time 2-way audio visual technology. [Medical Office: ([facility])___] : The provider was located at the medical office in [unfilled]. [Home] : The patient, [unfilled], was located at home, [unfilled], at the time of the visit. [Verbal consent obtained from patient/other participant(s)] : Verbal consent for telehealth/telephonic services obtained from patient/other participant(s) [Patient] : Patient [FreeTextEntry4] : 10:04 am [FreeTextEntry5] : 11:00 am [FreeTextEntry1] : [place] is presenting today for a follow up psychotherapy session.
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
## 2024-02-21 NOTE — END OF VISIT
[Duration of Psychotherapy Visit (minutes spent in synchronous communication): ____] : Duration of Psychotherapy Visit (minutes spent in synchronous communication): [unfilled] [Individual Psychotherapy for 53+ minutes] : Individual Psychotherapy for 53+ minutes  [Teletherapy Service Provided] : The services provided in this session were delivered via tele-therapy [Licensed Clinician] : Licensed Clinician [FreeTextEntry3] : [place], [place] [FreeTextEntry5] : [facility] [address], [address], [place] [address]

## 2024-02-26 ENCOUNTER — APPOINTMENT (OUTPATIENT)
Dept: PSYCHIATRY | Facility: CLINIC | Age: 44
End: 2024-02-26
Payer: COMMERCIAL

## 2024-02-26 ENCOUNTER — APPOINTMENT (OUTPATIENT)
Dept: ORTHOPEDIC SURGERY | Facility: CLINIC | Age: 44
End: 2024-02-26
Payer: COMMERCIAL

## 2024-02-26 VITALS — BODY MASS INDEX: 34.2 KG/M2 | WEIGHT: 193 LBS | HEIGHT: 63 IN

## 2024-02-26 DIAGNOSIS — F43.10 POST-TRAUMATIC STRESS DISORDER, UNSPECIFIED: ICD-10-CM

## 2024-02-26 DIAGNOSIS — G56.22 LESION OF ULNAR NERVE, LEFT UPPER LIMB: ICD-10-CM

## 2024-02-26 DIAGNOSIS — F32.A ANXIETY DISORDER, UNSPECIFIED: ICD-10-CM

## 2024-02-26 DIAGNOSIS — F41.0 PANIC DISORDER [EPISODIC PAROXYSMAL ANXIETY]: ICD-10-CM

## 2024-02-26 DIAGNOSIS — F41.9 ANXIETY DISORDER, UNSPECIFIED: ICD-10-CM

## 2024-02-26 DIAGNOSIS — F51.02 ADJUSTMENT INSOMNIA: ICD-10-CM

## 2024-02-26 PROCEDURE — 99214 OFFICE O/P EST MOD 30 MIN: CPT | Mod: 95

## 2024-02-26 PROCEDURE — 99024 POSTOP FOLLOW-UP VISIT: CPT

## 2024-02-26 RX ORDER — VENLAFAXINE HYDROCHLORIDE 225 MG/1
225 TABLET, EXTENDED RELEASE ORAL
Qty: 90 | Refills: 1 | Status: ACTIVE | COMMUNITY
Start: 2021-05-12 | End: 1900-01-01

## 2024-02-26 RX ORDER — ALPRAZOLAM 0.25 MG/1
0.25 TABLET ORAL
Qty: 30 | Refills: 0 | Status: ACTIVE | COMMUNITY
Start: 2023-03-03 | End: 1900-01-01
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
## 2024-02-26 NOTE — HISTORY OF PRESENT ILLNESS
[FreeTextEntry1] : [place] is being seen today for a follow up visit for continuation of pharmacologic/psychiatric management.   During last visit: No treatment plan changes.  She reports compliance with the prescribed medication regime, tolerating well, no reported medication issues. Mood is "up and down," reports her schedule has been busy, with a lot of stressors. She mentions feeling intermittently sad with bouts of tearfulness, difficulty with sleep initiation, and feeling lonely without her partner. She mentions the sleep is disturbance is frustrating and she would be willing to take medication to improve symptoms, she denies No SIIP/HIIP/ SIB, hypomanic/ manic mood features, AVH, No delusions or paranoia were elicited.  Regarding appetite, has lost 50lbs with Ozempic use.  Remains in good overall medical health, interval changes. Denies increased or problematic substance use. Engaged in work, engaging in social settings and interpersonal relationships, psychotherapy, and maintains functional ADLs Remains in good medical health with no interval changes and expresses future motivation and engages well in interview.   Would like to continue the current medications. Will need refills today.

## 2024-02-26 NOTE — HISTORY OF PRESENT ILLNESS
[1] : 2 [3] : 3 [Full time] : Work status: full time [de-identified] : 43 year old female 6 weeks s/p LEFT CTR and LEFT elbow ulnar nerve decompression.  She states her sensation has reutnred.  Will have some ocasional tingling. [de-identified] : no treatment

## 2024-02-26 NOTE — PHYSICAL EXAM
[Cooperative] : cooperative [Depressed] : depressed [Full] : full [Clear] : clear [Linear/Goal Directed] : linear/goal directed [None] : none [None Reported] : none reported [Average] : average [WNL] : within normal limits [FreeTextEntry2] : Unwitnessed [FreeTextEntry3] : Unwitnessed

## 2024-02-26 NOTE — PLAN
[Yes. details: ___] : Yes, [unfilled] [Medication education provided] : Medication education provided. [Rationale for medication choices, possible risks/precautions, benefits, alternative treatment choices, and consequences of non-treatment discussed] : Rationale for medication choices, possible risks/precautions, benefits, alternative treatment choices, and consequences of non-treatment discussed with patient/family/caregiver  [FreeTextEntry4] : TYPE OF THERAPY PERFORM: BRIEF CBT, SUPPORTIVE PSYCHOTHERAPY, BEHAVIORAL MODIFICATION   PROBLEM: Anxiety/ Depression  LONG TERM GOAL: Alleviate symptoms of psychiatric sxs, so as the patient may return to normal functioning.  INTERVENTION: PATIENT WAS PROVIDED WITH 16 MINUTES, of supportive psychotherapy during today's session. Explored feelings/emotions surrounding current stressors. Discussed with patient stressor at work and struggles with interpersonal communication skills. Provider validated patient's emotion/experience. Explored communications style/types. Discussed sources of ki, and upcoming experiences, patient expressing future motivation. Explored current coping skills and strategies to relieve current symptoms. Explore solution focused options to reduced stressors including creative behavioral modifications. Focused on positive achievements towards mental health goals, and instilled hope.  Reviewed/reinforced psychoeducation on medication regime, diagnosis and treatment plan. Additionally, discussed/reviewed the incorporation of holistic modalities to enhance mood and reduce feelings of anxiety. Supportive listening and interactive feedback was provided.    PATIENT RESPONSE: Patient participated actively and remained engaged in today's session, patient is progressing well towards long-term treatment goal.   [FreeTextEntry5] : Mood/Anxiety, PTSD Management: Changes: Increase Lamotrigine to 100mg once daily for ongoing therapeutic benefits with mood/anxiety Continue to monitor symptoms for remission/exacerbation Reviewed healthy daily routines including participation in regular exercise, leisure and mindfulness activities.  Insomnia: Start Trial of Trazodone 50mg (1-2tabs) orally at bedtime as needed Continue Melatonin PRN Discussed sleep hygiene.  Medication changes: Start Trazodone, titrate Lamotrigine  Follow-up and Monitoring:   - Patient was given opportunity to ask questions, all questions were answered, patient is agreeable to treatment plan   - Psychoeducation and supportive therapy provided, and discussed rationale for the recommended medications   - Educated patient of importance of remaining abstinent from drugs and alcohol, risks of short-term and long-term use, hazards of combining with medications   - Emergency procedures were discussed: Pt educated to call [phone] or go to the nearest ER for worsening symptoms/suicidal/homicidal ideations   - Follow-up appointment scheduled for : 8 weeks, please call the office to schedule follow up [phone]   Plan for next visit:   - Assess patient's response to medication changes and overall mental health status.   - Address any new symptoms or concerns that may arise.

## 2024-02-26 NOTE — REASON FOR VISIT
[Patient preference] : as per patient preference [Telehealth (audio & video) - Individual/Group] : This visit was provided via telehealth using real-time 2-way audio visual technology. [Medical Office: ([facility])___] : The provider was located at the medical office in [unfilled]. [Home] : The patient, [unfilled], was located at home, [unfilled], at the time of the visit. [Patient] : Patient [FreeTextEntry4] : 3576 [FreeTextEntry5] : 5509 [FreeTextEntry1] : OLI VALDIVIA is a 42 year old F,  being seen today for a follow up visit for medication management

## 2024-02-28 ENCOUNTER — APPOINTMENT (OUTPATIENT)
Dept: PSYCHIATRY | Facility: CLINIC | Age: 44
End: 2024-02-28
Payer: COMMERCIAL

## 2024-02-28 PROCEDURE — 90837 PSYTX W PT 60 MINUTES: CPT | Mod: 95
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
## 2024-02-28 NOTE — END OF VISIT
[Duration of Psychotherapy Visit (minutes spent in synchronous communication): ____] : Duration of Psychotherapy Visit (minutes spent in synchronous communication): [unfilled] [Individual Psychotherapy for 53+ minutes] : Individual Psychotherapy for 53+ minutes  [Teletherapy Service Provided] : The services provided in this session were delivered via tele-therapy [Licensed Clinician] : Licensed Clinician [FreeTextEntry3] : [place], [place] [FreeTextEntry5] : [facility] [place] [address], [address], [place] [address]
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
## 2024-02-28 NOTE — REASON FOR VISIT
[Patient preference] : as per patient preference [Telehealth (audio & video) - Individual/Group] : This visit was provided via telehealth using real-time 2-way audio visual technology. [Medical Office: ([facility])___] : The provider was located at the medical office in [unfilled]. [Home] : The patient, [unfilled], was located at home, [unfilled], at the time of the visit. [Verbal consent obtained from patient/other participant(s)] : Verbal consent for telehealth/telephonic services obtained from patient/other participant(s) [Patient] : Patient [FreeTextEntry4] : 10:09 am [FreeTextEntry5] : 11:05 am [FreeTextEntry1] : [place] is presenting today for a follow up psychotherapy session.

## 2024-02-28 NOTE — PHYSICAL EXAM
[Well groomed] : well groomed [Cooperative] : cooperative [Euthymic] : euthymic [Clear] : clear [Full] : full [Linear/Goal Directed] : linear/goal directed [None] : none [None Reported] : none reported [Average] : average [WNL] : within normal limits

## 2024-03-04 ENCOUNTER — NON-APPOINTMENT (OUTPATIENT)
Age: 44
End: 2024-03-04

## 2024-03-06 ENCOUNTER — NON-APPOINTMENT (OUTPATIENT)
Age: 44
End: 2024-03-06

## 2024-03-07 ENCOUNTER — RX RENEWAL (OUTPATIENT)
Age: 44
End: 2024-03-07

## 2024-03-08 ENCOUNTER — APPOINTMENT (OUTPATIENT)
Dept: PSYCHIATRY | Facility: CLINIC | Age: 44
End: 2024-03-08
Payer: COMMERCIAL

## 2024-03-08 PROCEDURE — 90837 PSYTX W PT 60 MINUTES: CPT | Mod: 95
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
## 2024-03-08 NOTE — REASON FOR VISIT
[Patient preference] : as per patient preference [Medical Office: ([facility])___] : The provider was located at the medical office in [unfilled]. [Telehealth (audio & video) - Individual/Group] : This visit was provided via telehealth using real-time 2-way audio visual technology. [Home] : The patient, [unfilled], was located at home, [unfilled], at the time of the visit. [Verbal consent obtained from patient/other participant(s)] : Verbal consent for telehealth/telephonic services obtained from patient/other participant(s) [Patient] : Patient [FreeTextEntry4] : 11:04 am [FreeTextEntry5] : 12:01 pm [FreeTextEntry1] : [place] is presenting today for a follow up psychotherapy session.

## 2024-03-08 NOTE — END OF VISIT
[Duration of Psychotherapy Visit (minutes spent in synchronous communication): ____] : Duration of Psychotherapy Visit (minutes spent in synchronous communication): [unfilled] [Individual Psychotherapy for 53+ minutes] : Individual Psychotherapy for 53+ minutes  [Teletherapy Service Provided] : The services provided in this session were delivered via tele-therapy [Licensed Clinician] : Licensed Clinician [FreeTextEntry3] : [place], [place] [FreeTextEntry5] : [facility] [place] [address], [address], [place] [address]

## 2024-03-13 ENCOUNTER — APPOINTMENT (OUTPATIENT)
Dept: PSYCHIATRY | Facility: CLINIC | Age: 44
End: 2024-03-13
Payer: COMMERCIAL

## 2024-03-13 PROCEDURE — 90837 PSYTX W PT 60 MINUTES: CPT | Mod: 95

## 2024-03-13 NOTE — REASON FOR VISIT
[Telehealth (audio & video) - Individual/Group] : This visit was provided via telehealth using real-time 2-way audio visual technology. [Patient preference] : as per patient preference [Medical Office: ([facility])___] : The provider was located at the medical office in [unfilled]. [Verbal consent obtained from patient/other participant(s)] : Verbal consent for telehealth/telephonic services obtained from patient/other participant(s) [Home] : The patient, [unfilled], was located at home, [unfilled], at the time of the visit. [Patient] : Patient [FreeTextEntry4] : 10:04 am [FreeTextEntry5] : 11:00 am [FreeTextEntry1] : [place] is presenting today for a follow up psychotherapy session.
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
## 2024-03-13 NOTE — END OF VISIT
[Duration of Psychotherapy Visit (minutes spent in synchronous communication): ____] : Duration of Psychotherapy Visit (minutes spent in synchronous communication): [unfilled] [Individual Psychotherapy for 53+ minutes] : Individual Psychotherapy for 53+ minutes  [Teletherapy Service Provided] : The services provided in this session were delivered via tele-therapy [Licensed Clinician] : Licensed Clinician [FreeTextEntry3] : [place], [place] [FreeTextEntry5] : [facility] [place] [address], [address], [place] [address]

## 2024-03-20 ENCOUNTER — APPOINTMENT (OUTPATIENT)
Dept: PSYCHIATRY | Facility: CLINIC | Age: 44
End: 2024-03-20
Payer: COMMERCIAL

## 2024-03-20 PROCEDURE — 90834 PSYTX W PT 45 MINUTES: CPT | Mod: 95

## 2024-03-20 NOTE — END OF VISIT
[Duration of Psychotherapy Visit (minutes spent in synchronous communication): ____] : Duration of Psychotherapy Visit (minutes spent in synchronous communication): [unfilled] [Individual Psychotherapy for 38-52 minutes] : Individual Psychotherapy for 38 - 52 minutes [Teletherapy Service Provided] : The services provided in this session were delivered via tele-therapy [Licensed Clinician] : Licensed Clinician [FreeTextEntry3] : [place], [place] [FreeTextEntry5] : [facility] at [place] [address], [address], [place] [address]
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
## 2024-03-20 NOTE — PHYSICAL EXAM
[Well groomed] : well groomed [Cooperative] : cooperative [Full] : full [Euthymic] : euthymic [Clear] : clear [Linear/Goal Directed] : linear/goal directed [None] : none [None Reported] : none reported [Average] : average [WNL] : within normal limits

## 2024-03-20 NOTE — REASON FOR VISIT
[Patient preference] : as per patient preference [Telehealth (audio & video) - Individual/Group] : This visit was provided via telehealth using real-time 2-way audio visual technology. [Medical Office: ([facility])___] : The provider was located at the medical office in [unfilled]. [Home] : The patient, [unfilled], was located at home, [unfilled], at the time of the visit. [Verbal consent obtained from patient/other participant(s)] : Verbal consent for telehealth/telephonic services obtained from patient/other participant(s) [Patient] : Patient [FreeTextEntry4] : 10:17 am [FreeTextEntry5] : 11:00 am [FreeTextEntry1] : [place] is presenting today for a follow up psychotherapy session.

## 2024-03-27 ENCOUNTER — APPOINTMENT (OUTPATIENT)
Dept: PSYCHIATRY | Facility: CLINIC | Age: 44
End: 2024-03-27
Payer: COMMERCIAL

## 2024-03-27 PROCEDURE — 90837 PSYTX W PT 60 MINUTES: CPT | Mod: 95

## 2024-03-27 NOTE — END OF VISIT
[Duration of Psychotherapy Visit (minutes spent in synchronous communication): ____] : Duration of Psychotherapy Visit (minutes spent in synchronous communication): [unfilled] [Individual Psychotherapy for 53+ minutes] : Individual Psychotherapy for 53+ minutes  [Teletherapy Service Provided] : The services provided in this session were delivered via tele-therapy [Licensed Clinician] : Licensed Clinician [FreeTextEntry3] : [place], [place] [FreeTextEntry5] : [facility] at [place] [address], [address], [place] [address] detailed exam
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
## 2024-03-27 NOTE — REASON FOR VISIT
[Patient preference] : as per patient preference [Telehealth (audio & video) - Individual/Group] : This visit was provided via telehealth using real-time 2-way audio visual technology. [Medical Office: ([facility])___] : The provider was located at the medical office in [unfilled]. [Home] : The patient, [unfilled], was located at home, [unfilled], at the time of the visit. [Verbal consent obtained from patient/other participant(s)] : Verbal consent for telehealth/telephonic services obtained from patient/other participant(s) [Patient] : Patient [FreeTextEntry4] : 10:03 am [FreeTextEntry5] : 11:00 am [FreeTextEntry1] : [place] is presenting today for a follow up psychotherapy session.

## 2024-04-01 ENCOUNTER — RX RENEWAL (OUTPATIENT)
Age: 44
End: 2024-04-01

## 2024-04-04 ENCOUNTER — APPOINTMENT (OUTPATIENT)
Dept: PSYCHIATRY | Facility: CLINIC | Age: 44
End: 2024-04-04

## 2024-04-08 ENCOUNTER — RX RENEWAL (OUTPATIENT)
Age: 44
End: 2024-04-08

## 2024-04-10 ENCOUNTER — APPOINTMENT (OUTPATIENT)
Dept: PSYCHIATRY | Facility: CLINIC | Age: 44
End: 2024-04-10
Payer: COMMERCIAL

## 2024-04-10 ENCOUNTER — APPOINTMENT (OUTPATIENT)
Dept: FAMILY MEDICINE | Facility: CLINIC | Age: 44
End: 2024-04-10
Payer: COMMERCIAL

## 2024-04-10 VITALS
DIASTOLIC BLOOD PRESSURE: 86 MMHG | HEIGHT: 63 IN | BODY MASS INDEX: 34.02 KG/M2 | SYSTOLIC BLOOD PRESSURE: 124 MMHG | WEIGHT: 192 LBS | HEART RATE: 76 BPM | OXYGEN SATURATION: 99 %

## 2024-04-10 DIAGNOSIS — I10 ESSENTIAL (PRIMARY) HYPERTENSION: ICD-10-CM

## 2024-04-10 DIAGNOSIS — Z76.89 PERSONS ENCOUNTERING HEALTH SERVICES IN OTHER SPECIFIED CIRCUMSTANCES: ICD-10-CM

## 2024-04-10 DIAGNOSIS — E78.00 PURE HYPERCHOLESTEROLEMIA, UNSPECIFIED: ICD-10-CM

## 2024-04-10 PROCEDURE — 99214 OFFICE O/P EST MOD 30 MIN: CPT

## 2024-04-10 PROCEDURE — G0447 BEHAVIOR COUNSEL OBESITY 15M: CPT | Mod: 59

## 2024-04-10 PROCEDURE — 90837 PSYTX W PT 60 MINUTES: CPT | Mod: 95

## 2024-04-10 RX ORDER — SEMAGLUTIDE 0.68 MG/ML
2 INJECTION, SOLUTION SUBCUTANEOUS
Qty: 3 | Refills: 1 | Status: COMPLETED | COMMUNITY
Start: 2022-12-01 | End: 2024-04-10

## 2024-04-10 NOTE — REASON FOR VISIT
[Patient preference] : as per patient preference [Telehealth (audio & video) - Individual/Group] : This visit was provided via telehealth using real-time 2-way audio visual technology. [Medical Office: ([facility])___] : The provider was located at the medical office in [unfilled]. [Home] : The patient, [unfilled], was located at home, [unfilled], at the time of the visit. [Verbal consent obtained from patient/other participant(s)] : Verbal consent for telehealth/telephonic services obtained from patient/other participant(s) [Patient] : Patient [FreeTextEntry4] : 10:03 am [FreeTextEntry5] : 11:00 am [FreeTextEntry1] : [place] is presenting today for a follow up psychotherapy session.

## 2024-04-10 NOTE — END OF VISIT
[Duration of Psychotherapy Visit (minutes spent in synchronous communication): ____] : Duration of Psychotherapy Visit (minutes spent in synchronous communication): [unfilled] [Individual Psychotherapy for 53+ minutes] : Individual Psychotherapy for 53+ minutes  [Teletherapy Service Provided] : The services provided in this session were delivered via tele-therapy [Licensed Clinician] : Licensed Clinician [FreeTextEntry3] : [place], [place] [FreeTextEntry5] : [facility] at [place] [address], [address] [address]
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
## 2024-04-11 ENCOUNTER — APPOINTMENT (OUTPATIENT)
Dept: ORTHOPEDIC SURGERY | Facility: CLINIC | Age: 44
End: 2024-04-11
Payer: COMMERCIAL

## 2024-04-11 VITALS — HEIGHT: 63 IN | BODY MASS INDEX: 34.02 KG/M2 | WEIGHT: 192 LBS

## 2024-04-11 DIAGNOSIS — G56.02 CARPAL TUNNEL SYNDROME, LEFT UPPER LIMB: ICD-10-CM

## 2024-04-11 PROCEDURE — 99024 POSTOP FOLLOW-UP VISIT: CPT

## 2024-04-11 NOTE — PHYSICAL EXAM
[Left] : left elbow [FreeTextEntry3] : Wounds well healed.  FDI and thumb abduction 5/5 FROM of elbow
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
## 2024-04-15 PROBLEM — Z76.89 ENCOUNTER FOR WEIGHT MANAGEMENT: Status: ACTIVE | Noted: 2024-04-15

## 2024-04-15 PROBLEM — I10 HYPERTENSION: Status: ACTIVE | Noted: 2020-02-27

## 2024-04-15 RX ORDER — RAMIPRIL 10 MG/1
10 CAPSULE ORAL
Qty: 180 | Refills: 2 | Status: ACTIVE | COMMUNITY
Start: 2022-12-16 | End: 1900-01-01

## 2024-04-15 RX ORDER — ATORVASTATIN CALCIUM 40 MG/1
40 TABLET, FILM COATED ORAL
Qty: 90 | Refills: 2 | Status: ACTIVE | COMMUNITY
Start: 2022-12-16 | End: 1900-01-01

## 2024-04-15 NOTE — COUNSELING
[Benefits of weight loss discussed] : Benefits of weight loss discussed [Structured Weight Management Program suggested:] : Structured weight management program suggested [Encouraged to maintain food diary] : Encouraged to maintain food diary [Encouraged to increase physical activity] : Encouraged to increase physical activity [Decrease Portions] : decrease portions [FreeTextEntry1] : Weight watchers and [employer] [employer] program.  [FreeTextEntry4] : 15

## 2024-04-15 NOTE — HISTORY OF PRESENT ILLNESS
[FreeTextEntry1] : Medication follow up [de-identified] : A 43-year-old female presents today for a Medication follow up. Mood is good. Pt is currently taking Ozempic 1mg, she has been on this dosage for a few months. She states the weight loss has stalled, feels it was effective in suppressing her appetite but has been wearing off through the week. She is not engaging in the Path program. Pt has not been engaging in physical therapy due to back pain, states she will start soon.

## 2024-04-15 NOTE — HEALTH RISK ASSESSMENT
[No] : In the past 12 months have you used drugs other than those required for medical reasons? No [de-identified] : She is not engaging in weight watchers.

## 2024-04-15 NOTE — PLAN
[FreeTextEntry1] : -Recommended she start the Path program affiliated with [facility].  -Recommended she start the weight watchers program.  -Will renew the patient Ozempic 2mg.  -Will follow-up with the patient in 3 months.

## 2024-04-17 ENCOUNTER — APPOINTMENT (OUTPATIENT)
Dept: PSYCHIATRY | Facility: CLINIC | Age: 44
End: 2024-04-17
Payer: COMMERCIAL

## 2024-04-17 PROCEDURE — 90837 PSYTX W PT 60 MINUTES: CPT | Mod: 95

## 2024-04-17 NOTE — END OF VISIT
[Duration of Psychotherapy Visit (minutes spent in synchronous communication): ____] : Duration of Psychotherapy Visit (minutes spent in synchronous communication): [unfilled] [Individual Psychotherapy for 53+ minutes] : Individual Psychotherapy for 53+ minutes  [Teletherapy Service Provided] : The services provided in this session were delivered via tele-therapy [Licensed Clinician] : Licensed Clinician [FreeTextEntry3] : [place], [place] [FreeTextEntry5] : [facility] [place] [address], [address], [place] [address]
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
## 2024-04-17 NOTE — REASON FOR VISIT
[Patient preference] : as per patient preference [Telehealth (audio & video) - Individual/Group] : This visit was provided via telehealth using real-time 2-way audio visual technology. [Medical Office: ([facility])___] : The provider was located at the medical office in [unfilled]. [Home] : The patient, [unfilled], was located at home, [unfilled], at the time of the visit. [Verbal consent obtained from patient/other participant(s)] : Verbal consent for telehealth/telephonic services obtained from patient/other participant(s) [Patient] : Patient [FreeTextEntry4] : 10:04 am [FreeTextEntry5] : 11:00 am [FreeTextEntry1] : [place] is presenting today for a follow up psychotherapy session.

## 2024-04-23 ENCOUNTER — NON-APPOINTMENT (OUTPATIENT)
Age: 44
End: 2024-04-23

## 2024-04-24 ENCOUNTER — APPOINTMENT (OUTPATIENT)
Dept: ORTHOPEDIC SURGERY | Facility: CLINIC | Age: 44
End: 2024-04-24
Payer: COMMERCIAL

## 2024-04-24 DIAGNOSIS — M54.32 SCIATICA, RIGHT SIDE: ICD-10-CM

## 2024-04-24 DIAGNOSIS — M54.31 SCIATICA, RIGHT SIDE: ICD-10-CM

## 2024-04-24 PROCEDURE — 99214 OFFICE O/P EST MOD 30 MIN: CPT

## 2024-04-24 RX ORDER — CYCLOBENZAPRINE HYDROCHLORIDE 5 MG/1
5 TABLET, FILM COATED ORAL
Qty: 30 | Refills: 1 | Status: ACTIVE | COMMUNITY
Start: 2024-04-24 | End: 1900-01-01

## 2024-04-24 RX ORDER — MELOXICAM 15 MG/1
15 TABLET ORAL DAILY
Qty: 30 | Refills: 1 | Status: ACTIVE | COMMUNITY
Start: 2024-04-24 | End: 1900-01-01

## 2024-04-24 NOTE — PHYSICAL EXAM
[Normal Coordination] : normal coordination [Normal DTR UE/LE] : normal DTR UE/LE  [Normal Sensation] : normal sensation [Normal Mood and Affect] : normal mood and affect [Oriented] : oriented [Able to Communicate] : able to communicate [Normal Skin] : normal skin [No Rash] : no rash [No Ulcers] : no ulcers [No Lesions] : no lesions [No obvious lymphadenopathy in areas examined] : no obvious lymphadenopathy in areas examined [Well Developed] : well developed [Peripheral vascular exam is grossly normal] : peripheral vascular exam is grossly normal [No Respiratory Distress] : no respiratory distress [Flexion] : flexion [Extension] : extension [] : mildly antalgic

## 2024-04-26 RX ORDER — SEMAGLUTIDE 2.68 MG/ML
8 INJECTION, SOLUTION SUBCUTANEOUS
Qty: 3 | Refills: 1 | Status: ACTIVE | COMMUNITY
Start: 2023-12-21

## 2024-04-28 NOTE — DISCUSSION/SUMMARY
[de-identified] : We discussed the results of MRI studies conducted on the lumbar and thoracic spine, which did not reveal any surgical pathology. Patient reports no long lasting relief after finishing course of physical therapy, it was emphasized she should resume home exercise regularly with emphasis on piriformis and gluteal stretching exercises. Renewed cyclobenzaprine 5mg and Meloxicam. FUV 2/3 months.  Cumulative encounter duration exceeded 30 minutes.  Prior to appointment and during encounter with patient extensive medical records were reviewed including but not limited to, hospital records, outpatient records, imaging results, and lab data.During this appointment the patient was examined, diagnoses were discussed and explained in a face to face manner. In addition extensive time was spent reviewing aforementioned diagnostic studies. Counseling including abnormal image results, differential diagnoses, treatment options, risk and benefits, lifestyle changes, current condition, and current medications was performed. Patient's comments, questions, and concerns were addressed and patient verbalized understanding. Based on this patient's presentation at our office, which is an orthopedic spine surgeon's office, this patient inherently / intrinsically has a risk, however minute, of developing issues such as Cauda equina syndrome, bowel and bladder changes, or progression of motor or neurological deficits such as paralysis which may be permanent.  CHELSEA GORDILLO Acting as a Scribe for Dr. Howard HERRERA, Chelsea Gordillo, attest that this documentation has been prepared under the direction and in the presence of Provider Michael Lawrence MD.
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
## 2024-04-28 NOTE — DATA REVIEWED
[Lumbar Spine] : lumbar spine [MRI] : MRI [Thoracic Spine] : thoracic spine [Report was reviewed and noted in the chart] : The report was reviewed and noted in the chart [I independently reviewed and interpreted images and report] : I independently reviewed and interpreted images and report [I reviewed the films/CD and additionally noted] : I reviewed the films/CD and additionally noted [FreeTextEntry2] : No nerve or spinal cord compression. Lower thoracic disc protrusion.   [FreeTextEntry1] : I stop paperwork reviewed PT progress notes reviewed

## 2024-05-01 ENCOUNTER — APPOINTMENT (OUTPATIENT)
Dept: PSYCHIATRY | Facility: CLINIC | Age: 44
End: 2024-05-01
Payer: COMMERCIAL

## 2024-05-01 PROCEDURE — 90837 PSYTX W PT 60 MINUTES: CPT | Mod: 95

## 2024-05-01 NOTE — REASON FOR VISIT
[Patient preference] : as per patient preference [Telehealth (audio & video) - Individual/Group] : This visit was provided via telehealth using real-time 2-way audio visual technology. [Medical Office: ([facility])___] : The provider was located at the medical office in [unfilled]. [Home] : The patient, [unfilled], was located at home, [unfilled], at the time of the visit. [Verbal consent obtained from patient/other participant(s)] : Verbal consent for telehealth/telephonic services obtained from patient/other participant(s) [Patient] : Patient [FreeTextEntry4] : 10:03 am [FreeTextEntry5] : 11:00 am [FreeTextEntry1] : [place] is presenting today for a follow up psychotherapy session.
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
## 2024-05-01 NOTE — END OF VISIT
[Duration of Psychotherapy Visit (minutes spent in synchronous communication): ____] : Duration of Psychotherapy Visit (minutes spent in synchronous communication): [unfilled] [Individual Psychotherapy for 53+ minutes] : Individual Psychotherapy for 53+ minutes  [Teletherapy Service Provided] : The services provided in this session were delivered via tele-therapy [Licensed Clinician] : Licensed Clinician [FreeTextEntry3] : [place], [place] [FreeTextEntry5] : [facility] [place] [address], [address], [place] [address]

## 2024-05-08 ENCOUNTER — APPOINTMENT (OUTPATIENT)
Dept: PSYCHIATRY | Facility: CLINIC | Age: 44
End: 2024-05-08
Payer: COMMERCIAL

## 2024-05-08 PROCEDURE — 90834 PSYTX W PT 45 MINUTES: CPT | Mod: 95

## 2024-05-08 NOTE — END OF VISIT
[Duration of Psychotherapy Visit (minutes spent in synchronous communication): ____] : Duration of Psychotherapy Visit (minutes spent in synchronous communication): [unfilled] [Teletherapy Service Provided] : The services provided in this session were delivered via tele-therapy [Licensed Clinician] : Licensed Clinician [Individual Psychotherapy for 38-52 minutes] : Individual Psychotherapy for 38 - 52 minutes [FreeTextEntry3] : [place], [place] [FreeTextEntry5] : [facility] [place] [address], [address], [place] [address]
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
## 2024-05-08 NOTE — REASON FOR VISIT
[Patient preference] : as per patient preference [Telehealth (audio & video) - Individual/Group] : This visit was provided via telehealth using real-time 2-way audio visual technology. [Medical Office: ([facility])___] : The provider was located at the medical office in [unfilled]. [Home] : The patient, [unfilled], was located at home, [unfilled], at the time of the visit. [Verbal consent obtained from patient/other participant(s)] : Verbal consent for telehealth/telephonic services obtained from patient/other participant(s) [Patient] : Patient [FreeTextEntry4] : 10:11 am [FreeTextEntry5] : 11:00 am [FreeTextEntry1] : [place] is presenting today for a follow up psychotherapy session.

## 2024-05-13 ENCOUNTER — RX RENEWAL (OUTPATIENT)
Age: 44
End: 2024-05-13

## 2024-05-13 RX ORDER — TRAZODONE HYDROCHLORIDE 50 MG/1
50 TABLET ORAL
Qty: 180 | Refills: 0 | Status: ACTIVE | COMMUNITY
Start: 2024-02-26 | End: 1900-01-01

## 2024-05-15 ENCOUNTER — APPOINTMENT (OUTPATIENT)
Dept: PSYCHIATRY | Facility: CLINIC | Age: 44
End: 2024-05-15
Payer: COMMERCIAL

## 2024-05-15 PROCEDURE — 90837 PSYTX W PT 60 MINUTES: CPT | Mod: 95
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
## 2024-05-15 NOTE — END OF VISIT
[Duration of Psychotherapy Visit (minutes spent in synchronous communication): ____] : Duration of Psychotherapy Visit (minutes spent in synchronous communication): [unfilled] [Individual Psychotherapy for 53+ minutes] : Individual Psychotherapy for 53+ minutes  [Teletherapy Service Provided] : The services provided in this session were delivered via tele-therapy [Licensed Clinician] : Licensed Clinician [FreeTextEntry3] : [place], [place] [FreeTextEntry5] : [facility] [place] [address], [address], [place] [address]

## 2024-05-15 NOTE — REASON FOR VISIT
[Patient preference] : as per patient preference [Telehealth (audio & video) - Individual/Group] : This visit was provided via telehealth using real-time 2-way audio visual technology. [Medical Office: ([facility])___] : The provider was located at the medical office in [unfilled]. [Home] : The patient, [unfilled], was located at home, [unfilled], at the time of the visit. [Verbal consent obtained from patient/other participant(s)] : Verbal consent for telehealth/telephonic services obtained from patient/other participant(s) [Patient] : Patient [FreeTextEntry4] : 10:03 am [FreeTextEntry5] : 11:00 am [FreeTextEntry1] : [place] is presenting today for a follow up psychotherapy session.

## 2024-05-20 ENCOUNTER — RX RENEWAL (OUTPATIENT)
Age: 44
End: 2024-05-20

## 2024-05-20 RX ORDER — LAMOTRIGINE 100 MG/1
100 TABLET ORAL DAILY
Qty: 90 | Refills: 1 | Status: ACTIVE | COMMUNITY
Start: 2023-04-25 | End: 1900-01-01

## 2024-05-29 ENCOUNTER — APPOINTMENT (OUTPATIENT)
Dept: PSYCHIATRY | Facility: CLINIC | Age: 44
End: 2024-05-29
Payer: COMMERCIAL

## 2024-05-29 PROCEDURE — 90837 PSYTX W PT 60 MINUTES: CPT | Mod: 95
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
## 2024-05-29 NOTE — END OF VISIT
[Duration of Psychotherapy Visit (minutes spent in synchronous communication): ____] : Duration of Psychotherapy Visit (minutes spent in synchronous communication): [unfilled] [Individual Psychotherapy for 53+ minutes] : Individual Psychotherapy for 53+ minutes  [Teletherapy Service Provided] : The services provided in this session were delivered via tele-therapy [Licensed Clinician] : Licensed Clinician [FreeTextEntry3] : [place], [place] [FreeTextEntry5] : [facility] [place] [address], [address], [place] [address]

## 2024-05-29 NOTE — REASON FOR VISIT
[Patient preference] : as per patient preference [Telehealth (audio & video) - Individual/Group] : This visit was provided via telehealth using real-time 2-way audio visual technology. [Medical Office: ([facility])___] : The provider was located at the medical office in [unfilled]. [Home] : The patient, [unfilled], was located at home, [unfilled], at the time of the visit. [Verbal consent obtained from patient/other participant(s)] : Verbal consent for telehealth/telephonic services obtained from patient/other participant(s) [Patient] : Patient [FreeTextEntry4] : 10:06 am [FreeTextEntry5] : 11:00 am [FreeTextEntry1] : [place] is presenting today for a follow up psychotherapy session.

## 2024-06-03 ENCOUNTER — APPOINTMENT (OUTPATIENT)
Dept: OBGYN | Facility: CLINIC | Age: 44
End: 2024-06-03
Payer: COMMERCIAL

## 2024-06-03 VITALS
DIASTOLIC BLOOD PRESSURE: 72 MMHG | SYSTOLIC BLOOD PRESSURE: 140 MMHG | WEIGHT: 194 LBS | BODY MASS INDEX: 34.38 KG/M2 | HEIGHT: 63 IN

## 2024-06-03 DIAGNOSIS — Z91.89 OTHER SPECIFIED PERSONAL RISK FACTORS, NOT ELSEWHERE CLASSIFIED: ICD-10-CM

## 2024-06-03 PROCEDURE — 99214 OFFICE O/P EST MOD 30 MIN: CPT

## 2024-06-03 NOTE — HISTORY OF PRESENT ILLNESS
[FreeTextEntry1] : 42 yo, had genetic testing 2022-negative but had elevated lifetime risk score of 26%. She was told to rt for br exam and script for Mri.

## 2024-06-05 ENCOUNTER — APPOINTMENT (OUTPATIENT)
Dept: ORTHOPEDIC SURGERY | Facility: CLINIC | Age: 44
End: 2024-06-05

## 2024-06-05 ENCOUNTER — NON-APPOINTMENT (OUTPATIENT)
Age: 44
End: 2024-06-05

## 2024-06-12 ENCOUNTER — APPOINTMENT (OUTPATIENT)
Dept: PSYCHIATRY | Facility: CLINIC | Age: 44
End: 2024-06-12

## 2024-06-12 ENCOUNTER — APPOINTMENT (OUTPATIENT)
Dept: PSYCHIATRY | Facility: CLINIC | Age: 44
End: 2024-06-12
Payer: COMMERCIAL

## 2024-06-12 PROCEDURE — 90837 PSYTX W PT 60 MINUTES: CPT | Mod: 95

## 2024-06-12 NOTE — END OF VISIT
[Duration of Psychotherapy Visit (minutes spent in synchronous communication): ____] : Duration of Psychotherapy Visit (minutes spent in synchronous communication): [unfilled] [Individual Psychotherapy for 53+ minutes] : Individual Psychotherapy for 53+ minutes  [Teletherapy Service Provided] : The services provided in this session were delivered via tele-therapy [Licensed Clinician] : Licensed Clinician [FreeTextEntry3] : [place], [place] [FreeTextEntry5] : [facility] [place] [address], [address], [place] [address]
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
## 2024-06-12 NOTE — REASON FOR VISIT
[Patient preference] : as per patient preference [Telehealth (audio & video) - Individual/Group] : This visit was provided via telehealth using real-time 2-way audio visual technology. [Medical Office: ([facility])___] : The provider was located at the medical office in [unfilled]. [Home] : The patient, [unfilled], was located at home, [unfilled], at the time of the visit. [Verbal consent obtained from patient/other participant(s)] : Verbal consent for telehealth/telephonic services obtained from patient/other participant(s) [Patient] : Patient [FreeTextEntry4] : 2:00 pm [FreeTextEntry5] : 2:59 pm [FreeTextEntry1] : [place] is presenting today for a follow up psychotherapy session to address mood symptoms.

## 2024-06-13 ENCOUNTER — RX RENEWAL (OUTPATIENT)
Age: 44
End: 2024-06-13

## 2024-06-13 RX ORDER — AMLODIPINE BESYLATE 10 MG/1
10 TABLET ORAL
Qty: 90 | Refills: 0 | Status: ACTIVE | COMMUNITY
Start: 2022-09-30 | End: 1900-01-01

## 2024-06-20 ENCOUNTER — APPOINTMENT (OUTPATIENT)
Dept: PSYCHIATRY | Facility: CLINIC | Age: 44
End: 2024-06-20
Payer: COMMERCIAL

## 2024-06-20 PROCEDURE — 90837 PSYTX W PT 60 MINUTES: CPT | Mod: 95

## 2024-06-20 NOTE — REASON FOR VISIT
[Patient preference] : as per patient preference [Telehealth (audio & video) - Individual/Group] : This visit was provided via telehealth using real-time 2-way audio visual technology. [Medical Office: ([facility])___] : The provider was located at the medical office in [unfilled]. [Home] : The patient, [unfilled], was located at home, [unfilled], at the time of the visit. [Verbal consent obtained from patient/other participant(s)] : Verbal consent for telehealth/telephonic services obtained from patient/other participant(s) [Patient] : Patient [FreeTextEntry4] : 3:00 pm [FreeTextEntry5] : 4:00 pm [FreeTextEntry1] : [place] is presenting today for a follow up psychotherapy session to address mood symptoms.
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
## 2024-06-20 NOTE — END OF VISIT
[Duration of Psychotherapy Visit (minutes spent in synchronous communication): ____] : Duration of Psychotherapy Visit (minutes spent in synchronous communication): [unfilled] [Individual Psychotherapy for 53+ minutes] : Individual Psychotherapy for 53+ minutes  [Teletherapy Service Provided] : The services provided in this session were delivered via tele-therapy [Licensed Clinician] : Licensed Clinician [FreeTextEntry3] : [place], [place] [FreeTextEntry5] : [facility] [place] [address], [address], [place] [address]

## 2024-06-25 ENCOUNTER — APPOINTMENT (OUTPATIENT)
Dept: PSYCHIATRY | Facility: CLINIC | Age: 44
End: 2024-06-25
Payer: COMMERCIAL

## 2024-06-25 PROCEDURE — 90837 PSYTX W PT 60 MINUTES: CPT | Mod: 95

## 2024-07-11 ENCOUNTER — APPOINTMENT (OUTPATIENT)
Dept: FAMILY MEDICINE | Facility: CLINIC | Age: 44
End: 2024-07-11
Payer: COMMERCIAL

## 2024-07-11 ENCOUNTER — APPOINTMENT (OUTPATIENT)
Dept: PSYCHIATRY | Facility: CLINIC | Age: 44
End: 2024-07-11
Payer: COMMERCIAL

## 2024-07-11 VITALS
HEART RATE: 81 BPM | WEIGHT: 176 LBS | OXYGEN SATURATION: 98 % | TEMPERATURE: 98.1 F | DIASTOLIC BLOOD PRESSURE: 96 MMHG | SYSTOLIC BLOOD PRESSURE: 142 MMHG | BODY MASS INDEX: 31.18 KG/M2 | HEIGHT: 63 IN

## 2024-07-11 VITALS — SYSTOLIC BLOOD PRESSURE: 130 MMHG | DIASTOLIC BLOOD PRESSURE: 90 MMHG

## 2024-07-11 DIAGNOSIS — E11.9 TYPE 2 DIABETES MELLITUS W/OUT COMPLICATIONS: ICD-10-CM

## 2024-07-11 DIAGNOSIS — G47.33 OBSTRUCTIVE SLEEP APNEA (ADULT) (PEDIATRIC): ICD-10-CM

## 2024-07-11 DIAGNOSIS — E78.5 HYPERLIPIDEMIA, UNSPECIFIED: ICD-10-CM

## 2024-07-11 DIAGNOSIS — I10 ESSENTIAL (PRIMARY) HYPERTENSION: ICD-10-CM

## 2024-07-11 PROCEDURE — G2211 COMPLEX E/M VISIT ADD ON: CPT

## 2024-07-11 PROCEDURE — 90837 PSYTX W PT 60 MINUTES: CPT | Mod: 95

## 2024-07-11 PROCEDURE — 99214 OFFICE O/P EST MOD 30 MIN: CPT

## 2024-07-16 LAB
CHOLEST SERPL-MCNC: 197 MG/DL
HDLC SERPL-MCNC: 65 MG/DL
LDLC SERPL CALC-MCNC: 115 MG/DL

## 2024-07-18 ENCOUNTER — APPOINTMENT (OUTPATIENT)
Dept: PSYCHIATRY | Facility: CLINIC | Age: 44
End: 2024-07-18
Payer: COMMERCIAL

## 2024-07-18 PROCEDURE — 90837 PSYTX W PT 60 MINUTES: CPT | Mod: 95

## 2024-07-22 ENCOUNTER — NON-APPOINTMENT (OUTPATIENT)
Age: 44
End: 2024-07-22

## 2024-07-22 LAB
ALBUMIN SERPL ELPH-MCNC: 4.2 G/DL
ALP BLD-CCNC: 112 U/L
ALT SERPL-CCNC: 11 U/L
ANION GAP SERPL CALC-SCNC: 13 MMOL/L
AST SERPL-CCNC: 13 U/L
BASOPHILS # BLD AUTO: 0.04 K/UL
BASOPHILS NFR BLD AUTO: 0.8 %
BILIRUB SERPL-MCNC: 0.3 MG/DL
BUN SERPL-MCNC: 10 MG/DL
CALCIUM SERPL-MCNC: 8.9 MG/DL
CO2 SERPL-SCNC: 25 MMOL/L
CREAT SERPL-MCNC: 0.59 MG/DL
EOSINOPHIL # BLD AUTO: 0.22 K/UL
EOSINOPHIL NFR BLD AUTO: 4.4 %
ESTIMATED AVERAGE GLUCOSE: 105 MG/DL
GLUCOSE SERPL-MCNC: 99 MG/DL
HBA1C MFR BLD HPLC: 5.3 %
HCT VFR BLD CALC: 41.7 %
IMM GRANULOCYTES NFR BLD AUTO: 0.2 %
LYMPHOCYTES # BLD AUTO: 1.04 K/UL
LYMPHOCYTES NFR BLD AUTO: 20.9 %
MAN DIFF?: NORMAL
MCHC RBC-ENTMCNC: 27.4 PG
MCHC RBC-ENTMCNC: 31.9 GM/DL
MCV RBC AUTO: 86 FL
MONOCYTES # BLD AUTO: 0.55 K/UL
MONOCYTES NFR BLD AUTO: 11 %
NEUTROPHILS # BLD AUTO: 3.12 K/UL
NEUTROPHILS NFR BLD AUTO: 62.7 %
PLATELET # BLD AUTO: 401 K/UL
POTASSIUM SERPL-SCNC: 4.3 MMOL/L
PROT SERPL-MCNC: 7.1 G/DL
RBC # FLD: 14.7 %
SODIUM SERPL-SCNC: 140 MMOL/L
TSH SERPL-ACNC: 1.67 UIU/ML
WBC # FLD AUTO: 4.98 K/UL

## 2024-07-25 ENCOUNTER — APPOINTMENT (OUTPATIENT)
Dept: PSYCHIATRY | Facility: CLINIC | Age: 44
End: 2024-07-25

## 2024-08-01 ENCOUNTER — APPOINTMENT (OUTPATIENT)
Dept: PSYCHIATRY | Facility: CLINIC | Age: 44
End: 2024-08-01
Payer: COMMERCIAL

## 2024-08-01 PROCEDURE — 90837 PSYTX W PT 60 MINUTES: CPT | Mod: 95

## 2024-08-01 NOTE — END OF VISIT
[Duration of Psychotherapy Visit (minutes spent in synchronous communication): ____] : Duration of Psychotherapy Visit (minutes spent in synchronous communication): [unfilled] [Individual Psychotherapy for 53+ minutes] : Individual Psychotherapy for 53+ minutes  [Teletherapy Service Provided] : The services provided in this session were delivered via tele-therapy [Licensed Clinician] : Licensed Clinician [FreeTextEntry3] : [place], [place] [FreeTextEntry5] : [facility] [place] [address], [address], [place] [address]
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
## 2024-08-01 NOTE — REASON FOR VISIT
[Patient preference] : as per patient preference [Telehealth (audio & video) - Individual/Group] : This visit was provided via telehealth using real-time 2-way audio visual technology. [Medical Office: ([facility])___] : The provider was located at the medical office in [unfilled]. [Home] : The patient, [unfilled], was located at home, [unfilled], at the time of the visit. [Verbal consent obtained from patient/other participant(s)] : Verbal consent for telehealth/telephonic services obtained from patient/other participant(s) [Patient] : Patient [FreeTextEntry4] : 3:01 pm [FreeTextEntry5] : 4:00 pm [FreeTextEntry1] : [place] is presenting today for a follow up psychotherapy session.
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
## 2024-08-12 ENCOUNTER — APPOINTMENT (OUTPATIENT)
Dept: PSYCHIATRY | Facility: CLINIC | Age: 44
End: 2024-08-12
Payer: COMMERCIAL

## 2024-08-12 DIAGNOSIS — G47.00 INSOMNIA, UNSPECIFIED: ICD-10-CM

## 2024-08-12 DIAGNOSIS — F41.9 ANXIETY DISORDER, UNSPECIFIED: ICD-10-CM

## 2024-08-12 DIAGNOSIS — F41.8 OTHER SPECIFIED ANXIETY DISORDERS: ICD-10-CM

## 2024-08-12 DIAGNOSIS — F41.0 PANIC DISORDER [EPISODIC PAROXYSMAL ANXIETY]: ICD-10-CM

## 2024-08-12 DIAGNOSIS — F32.A ANXIETY DISORDER, UNSPECIFIED: ICD-10-CM

## 2024-08-12 PROCEDURE — 99214 OFFICE O/P EST MOD 30 MIN: CPT | Mod: 95

## 2024-08-12 PROCEDURE — 90833 PSYTX W PT W E/M 30 MIN: CPT | Mod: 95
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
## 2024-08-12 NOTE — REASON FOR VISIT
[Patient preference] : as per patient preference [Telehealth (audio & video) - Individual/Group] : This visit was provided via telehealth using real-time 2-way audio visual technology. [Medical Office: ([facility])___] : The provider was located at the medical office in [unfilled]. [Home] : The patient, [unfilled], was located at home, [unfilled], at the time of the visit. [Patient] : Patient [FreeTextEntry4] : 4691 [FreeTextEntry5] : 7658 [FreeTextEntry1] : OLI VALDIVIA is a 42 year old F,  being seen today for a follow up visit for medication management

## 2024-08-12 NOTE — PLAN
[Yes. details: ___] : Yes, [unfilled] [Medication education provided] : Medication education provided. [Rationale for medication choices, possible risks/precautions, benefits, alternative treatment choices, and consequences of non-treatment discussed] : Rationale for medication choices, possible risks/precautions, benefits, alternative treatment choices, and consequences of non-treatment discussed with patient/family/caregiver  [Order(s) for medication placed in [facility] EHR] : Medication [FreeTextEntry4] : TYPE OF THERAPY PERFORM: BRIEF CBT, SUPPORTIVE PSYCHOTHERAPY, BEHAVIORAL MODIFICATION   PROBLEM: Anxiety/ Depression  LONG TERM GOAL: Alleviate symptoms of psychiatric sxs, so as the patient may return to normal functioning.  INTERVENTION: PATIENT WAS PROVIDED WITH 16 MINUTES, of supportive psychotherapy during today's session. Explored feelings/emotions surrounding current stressors. Discussed with patient stressor at work and struggles with interpersonal communication skills. Provider validated patient's emotion/experience. Explored communications style/types. Discussed sources of ki, and upcoming experiences, patient expressing future motivation. Explored current coping skills and strategies to relieve current symptoms. Explore solution focused options to reduced stressors including creative behavioral modifications. Focused on positive achievements towards mental health goals, and instilled hope.  Reviewed/reinforced psychoeducation on medication regime, diagnosis and treatment plan. Additionally, discussed/reviewed the incorporation of holistic modalities to enhance mood and reduce feelings of anxiety. Supportive listening and interactive feedback was provided.    PATIENT RESPONSE: Patient participated actively and remained engaged in today's session, patient is progressing well towards long-term treatment goal.   [FreeTextEntry5] : Mood/Anxiety, PTSD Management: Changes: Continue Lamotrigine / Venlafaxine Continue to monitor symptoms for remission/exacerbation Reviewed healthy daily routines including participation in regular exercise, leisure and mindfulness activities.  Insomnia: Continue Trazodone  Continue Melatonin PRN Discussed sleep hygiene.  Medication changes: NA  Follow-up and Monitoring:   - Patient was given opportunity to ask questions, all questions were answered, patient is agreeable to treatment plan   - Psychoeducation and supportive therapy provided, and discussed rationale for the recommended medications   - Educated patient of importance of remaining abstinent from drugs and alcohol, risks of short-term and long-term use, hazards of combining with medications   - Emergency procedures were discussed: Pt educated to call [phone], [phone] or go to the nearest ER for worsening symptoms/suicidal/homicidal ideations   - Follow-up appointment scheduled for : 8 weeks, please call the office to schedule follow up [phone]   Plan for next visit:   - Assess patient's response to medication changes and overall mental health status.   - Address any new symptoms or concerns that may arise.

## 2024-08-15 ENCOUNTER — APPOINTMENT (OUTPATIENT)
Dept: PSYCHIATRY | Facility: CLINIC | Age: 44
End: 2024-08-15
Payer: COMMERCIAL

## 2024-08-15 PROCEDURE — 90837 PSYTX W PT 60 MINUTES: CPT | Mod: 95

## 2024-08-15 NOTE — END OF VISIT
[Duration of Psychotherapy Visit (minutes spent in synchronous communication): ____] : Duration of Psychotherapy Visit (minutes spent in synchronous communication): [unfilled] [Individual Psychotherapy for 53+ minutes] : Individual Psychotherapy for 53+ minutes  [Teletherapy Service Provided] : The services provided in this session were delivered via tele-therapy [Licensed Clinician] : Licensed Clinician [FreeTextEntry3] : [place], [place] [FreeTextEntry5] : [facility] [place] [address], [address], [place] [address]

## 2024-08-15 NOTE — REASON FOR VISIT
[Patient preference] : as per patient preference [Telehealth (audio & video) - Individual/Group] : This visit was provided via telehealth using real-time 2-way audio visual technology. [Medical Office: ([facility])___] : The provider was located at the medical office in [unfilled]. [Home] : The patient, [unfilled], was located at home, [unfilled], at the time of the visit. [Verbal consent obtained from patient/other participant(s)] : Verbal consent for telehealth/telephonic services obtained from patient/other participant(s) [Patient] : Patient [FreeTextEntry4] : 9:02 am [FreeTextEntry5] : 10:00 am [FreeTextEntry1] : [place] is presenting today for a follow up psychotherapy session.

## 2024-08-15 NOTE — RISK ASSESSMENT
[No, patient denies ideation or behavior] : No, patient denies ideation or behavior [Low acute suicide risk] : Low acute suicide risk [No] : No [Not clinically indicated] : Safety Plan completed/updated (for individuals at risk): Not clinically indicated [FreeTextEntry9] : Patient denies SIIP/HIIP/AVH. Emergency procedures were discussed. Patient educated to call National Suicide Prevention Hotline at 988, call 911 or head to the nearest emergency room in the event of suicidal ideation/intent/plan or homicidal ideation/intent/plan.
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
## 2024-08-19 RX ORDER — ALPRAZOLAM 0.25 MG/1
0.25 TABLET ORAL
Qty: 30 | Refills: 0 | Status: DISCONTINUED | COMMUNITY
Start: 2024-08-13 | End: 2024-08-19

## 2024-08-19 RX ORDER — ALPRAZOLAM 0.25 MG/1
0.25 TABLET ORAL
Qty: 30 | Refills: 0 | Status: ACTIVE | COMMUNITY
Start: 2024-08-19 | End: 1900-01-01

## 2024-08-22 ENCOUNTER — APPOINTMENT (OUTPATIENT)
Dept: PSYCHIATRY | Facility: CLINIC | Age: 44
End: 2024-08-22
Payer: COMMERCIAL

## 2024-08-22 PROCEDURE — 90837 PSYTX W PT 60 MINUTES: CPT | Mod: 95

## 2024-08-23 NOTE — END OF VISIT
[Duration of Psychotherapy Visit (minutes spent in synchronous communication): ____] : Duration of Psychotherapy Visit (minutes spent in synchronous communication): [unfilled] [Individual Psychotherapy for 53+ minutes] : Individual Psychotherapy for 53+ minutes  [Teletherapy Service Provided] : The services provided in this session were delivered via tele-therapy [Licensed Clinician] : Licensed Clinician [FreeTextEntry3] : [place], [place] [FreeTextEntry5] : [facility] [place] [address], [address], [place] [address]
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
## 2024-08-23 NOTE — REASON FOR VISIT
[Patient preference] : as per patient preference [Telehealth (audio & video) - Individual/Group] : This visit was provided via telehealth using real-time 2-way audio visual technology. [Medical Office: ([facility])___] : The provider was located at the medical office in [unfilled]. [Home] : The patient, [unfilled], was located at home, [unfilled], at the time of the visit. [Verbal consent obtained from patient/other participant(s)] : Verbal consent for telehealth/telephonic services obtained from patient/other participant(s) [Patient] : Patient [FreeTextEntry4] : 3:04 pm [FreeTextEntry5] : 4:00 pm [FreeTextEntry1] : [place] is presenting today for a follow up psychotherapy session.

## 2024-08-23 NOTE — END OF VISIT
[Duration of Psychotherapy Visit (minutes spent in synchronous communication): ____] : Duration of Psychotherapy Visit (minutes spent in synchronous communication): [unfilled] [Individual Psychotherapy for 53+ minutes] : Individual Psychotherapy for 53+ minutes  [Teletherapy Service Provided] : The services provided in this session were delivered via tele-therapy [Licensed Clinician] : Licensed Clinician [FreeTextEntry3] : [place], [place] [FreeTextEntry5] : [facility] [place] [address], [address], [place] [address]

## 2024-08-23 NOTE — REASON FOR VISIT
[Patient preference] : as per patient preference [Telehealth (audio & video) - Individual/Group] : This visit was provided via telehealth using real-time 2-way audio visual technology. [Medical Office: ([facility])___] : The provider was located at the medical office in [unfilled]. [Home] : The patient, [unfilled], was located at home, [unfilled], at the time of the visit. [Verbal consent obtained from patient/other participant(s)] : Verbal consent for telehealth/telephonic services obtained from patient/other participant(s) [Patient] : Patient [FreeTextEntry4] : 3:04 pm [FreeTextEntry5] : 4:00 pm [FreeTextEntry1] : [place] is presenting today for a follow up psychotherapy session.
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
## 2024-08-27 ENCOUNTER — RX RENEWAL (OUTPATIENT)
Age: 44
End: 2024-08-27

## 2024-09-04 ENCOUNTER — APPOINTMENT (OUTPATIENT)
Dept: PSYCHIATRY | Facility: CLINIC | Age: 44
End: 2024-09-04
Payer: COMMERCIAL

## 2024-09-04 ENCOUNTER — APPOINTMENT (OUTPATIENT)
Dept: PULMONOLOGY | Facility: CLINIC | Age: 44
End: 2024-09-04
Payer: COMMERCIAL

## 2024-09-04 VITALS
WEIGHT: 175 LBS | DIASTOLIC BLOOD PRESSURE: 77 MMHG | BODY MASS INDEX: 31.01 KG/M2 | RESPIRATION RATE: 16 BRPM | HEIGHT: 63 IN | OXYGEN SATURATION: 97 % | HEART RATE: 87 BPM | SYSTOLIC BLOOD PRESSURE: 125 MMHG

## 2024-09-04 DIAGNOSIS — R06.83 SNORING: ICD-10-CM

## 2024-09-04 DIAGNOSIS — G47.33 OBSTRUCTIVE SLEEP APNEA (ADULT) (PEDIATRIC): ICD-10-CM

## 2024-09-04 DIAGNOSIS — F51.04 PSYCHOPHYSIOLOGIC INSOMNIA: ICD-10-CM

## 2024-09-04 DIAGNOSIS — F45.8 OTHER SOMATOFORM DISORDERS: ICD-10-CM

## 2024-09-04 DIAGNOSIS — G47.19 OTHER HYPERSOMNIA: ICD-10-CM

## 2024-09-04 DIAGNOSIS — E66.9 OBESITY, UNSPECIFIED: ICD-10-CM

## 2024-09-04 PROCEDURE — 90837 PSYTX W PT 60 MINUTES: CPT | Mod: 95

## 2024-09-04 PROCEDURE — 99203 OFFICE O/P NEW LOW 30 MIN: CPT

## 2024-09-04 NOTE — PHYSICAL EXAM
[No Acute Distress] : no acute distress [Elongated Uvula] : elongated uvula [Enlarged Base of the Tongue] : enlarged base of the tongue [II] : Mallampati Class: II [Normal Appearance] : normal appearance [No Neck Mass] : no neck mass [Normal Rate/Rhythm] : normal rate/rhythm [Normal S1, S2] : normal s1, s2 [No Resp Distress] : no resp distress [Clear to Auscultation Bilaterally] : clear to auscultation bilaterally [No Clubbing] : no clubbing [No Cyanosis] : no cyanosis [No Edema] : no edema

## 2024-09-04 NOTE — DISCUSSION/SUMMARY
[FreeTextEntry1] : IMP  Obesity Mild NEGRITA with severe sleep fragmentation Bruxism EDS Disruptive Snoring  Plan  Insomnia belief and behavior optimization reviewed  Oral appliance referral 3 month FU  PAST SURGICAL HISTORY:  H/O bilateral breast reduction surgery     H/O dilation and curettage two occurrences    History of laparoscopic cholecystectomy
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
## 2024-09-04 NOTE — HISTORY OF PRESENT ILLNESS
[TextBox_4] : 9/4/24  Chronic insomnia GERD Snoring and EDS Buxism Sleeps with 3 dogs Losing substantial weight on Ozempic (280=>175 lbs) Anxiety and panic DM, Hypercholesterolemia, CVA Sent for sleep study by Dr Flowers Here for sleep evaluation and management.

## 2024-09-04 NOTE — END OF VISIT
[Duration of Psychotherapy Visit (minutes spent in synchronous communication): ____] : Duration of Psychotherapy Visit (minutes spent in synchronous communication): [unfilled] [Teletherapy Service Provided] : The services provided in this session were delivered via tele-therapy [Licensed Clinician] : Licensed Clinician [Individual Psychotherapy for 53+ minutes] : Individual Psychotherapy for 53+ minutes  [FreeTextEntry3] : [place], [place] [FreeTextEntry5] : [facility] [place] [address], [address], [place] [address]

## 2024-09-04 NOTE — REASON FOR VISIT
[Patient preference] : as per patient preference [Telehealth (audio & video) - Individual/Group] : This visit was provided via telehealth using real-time 2-way audio visual technology. [Medical Office: ([facility])___] : The provider was located at the medical office in [unfilled]. [Home] : The patient, [unfilled], was located at home, [unfilled], at the time of the visit. [Verbal consent obtained from patient/other participant(s)] : Verbal consent for telehealth/telephonic services obtained from patient/other participant(s) [Patient] : Patient [FreeTextEntry4] : 2:00 pm [FreeTextEntry5] : 2:53 pm [FreeTextEntry1] : [place] is presenting today for a follow up psychotherapy session.

## 2024-09-11 ENCOUNTER — APPOINTMENT (OUTPATIENT)
Dept: ORTHOPEDIC SURGERY | Facility: CLINIC | Age: 44
End: 2024-09-11
Payer: COMMERCIAL

## 2024-09-11 DIAGNOSIS — M54.16 RADICULOPATHY, LUMBAR REGION: ICD-10-CM

## 2024-09-11 DIAGNOSIS — G57.02 LESION OF SCIATIC NERVE, LEFT LOWER LIMB: ICD-10-CM

## 2024-09-11 PROCEDURE — 99213 OFFICE O/P EST LOW 20 MIN: CPT

## 2024-09-11 RX ORDER — METHYLPREDNISOLONE 4 MG/1
4 TABLET ORAL
Qty: 1 | Refills: 0 | Status: ACTIVE | COMMUNITY
Start: 2024-09-11 | End: 1900-01-01

## 2024-09-12 DIAGNOSIS — Z12.31 ENCOUNTER FOR SCREENING MAMMOGRAM FOR MALIGNANT NEOPLASM OF BREAST: ICD-10-CM
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
## 2024-09-16 NOTE — DISCUSSION/SUMMARY
[de-identified] : We discussed the results of MRI studies conducted on the lumbar and thoracic spine, which did not reveal any surgical pathology. Patient reports no long lasting relief after finishing course of physical therapy, it was emphasized she should continue home exercise regularly with emphasis on piriformis and gluteal stretching exercises. I am referring the patient to pain management to discuss trying interventional injection therapies - we specifically discussed guided piriformis injection.   Prior to appointment and during encounter with patient extensive medical records were reviewed including but not limited to, hospital records, outpatient records, imaging results, and lab data.During this appointment the patient was examined, diagnoses were discussed and explained in a face to face manner. In addition extensive time was spent reviewing aforementioned diagnostic studies. Counseling including abnormal image results, differential diagnoses, treatment options, risk and benefits, lifestyle changes, current condition, and current medications was performed. Patient's comments, questions, and concerns were addressed and patient verbalized understanding. Based on this patient's presentation at our office, which is an orthopedic spine surgeon's office, this patient inherently / intrinsically has a risk, however minute, of developing issues such as Cauda equina syndrome, bowel and bladder changes, or progression of motor or neurological deficits such as paralysis which may be permanent.  CHELSEA GORDILLO Acting as a Scribe for Dr. Howard HERRERA, Chelsea Gordillo, attest that this documentation has been prepared under the direction and in the presence of Provider Michael Lawrence MD.
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
## 2024-09-16 NOTE — PHYSICAL EXAM
[Normal Coordination] : normal coordination [Normal DTR UE/LE] : normal DTR UE/LE  [Normal Sensation] : normal sensation [Normal Mood and Affect] : normal mood and affect [Oriented] : oriented [Able to Communicate] : able to communicate [Normal Skin] : normal skin [No Rash] : no rash [No Ulcers] : no ulcers [No Lesions] : no lesions [No obvious lymphadenopathy in areas examined] : no obvious lymphadenopathy in areas examined [Well Developed] : well developed [Peripheral vascular exam is grossly normal] : peripheral vascular exam is grossly normal [No Respiratory Distress] : no respiratory distress [Flexion] : flexion [Extension] : extension [] : non-antalgic

## 2024-09-16 NOTE — DISCUSSION/SUMMARY
[de-identified] : We discussed the results of MRI studies conducted on the lumbar and thoracic spine, which did not reveal any surgical pathology. Patient reports no long lasting relief after finishing course of physical therapy, it was emphasized she should continue home exercise regularly with emphasis on piriformis and gluteal stretching exercises. I am referring the patient to pain management to discuss trying interventional injection therapies - we specifically discussed guided piriformis injection.   Prior to appointment and during encounter with patient extensive medical records were reviewed including but not limited to, hospital records, outpatient records, imaging results, and lab data.During this appointment the patient was examined, diagnoses were discussed and explained in a face to face manner. In addition extensive time was spent reviewing aforementioned diagnostic studies. Counseling including abnormal image results, differential diagnoses, treatment options, risk and benefits, lifestyle changes, current condition, and current medications was performed. Patient's comments, questions, and concerns were addressed and patient verbalized understanding. Based on this patient's presentation at our office, which is an orthopedic spine surgeon's office, this patient inherently / intrinsically has a risk, however minute, of developing issues such as Cauda equina syndrome, bowel and bladder changes, or progression of motor or neurological deficits such as paralysis which may be permanent.  CHELSEA GORDILLO Acting as a Scribe for Dr. Howard HERRERA, Chelsea Gordillo, attest that this documentation has been prepared under the direction and in the presence of Provider Michael Lawrence MD.

## 2024-09-19 ENCOUNTER — APPOINTMENT (OUTPATIENT)
Dept: MAMMOGRAPHY | Facility: CLINIC | Age: 44
End: 2024-09-19
Payer: COMMERCIAL

## 2024-09-19 ENCOUNTER — APPOINTMENT (OUTPATIENT)
Dept: ULTRASOUND IMAGING | Facility: CLINIC | Age: 44
End: 2024-09-19
Payer: COMMERCIAL

## 2024-09-19 ENCOUNTER — OUTPATIENT (OUTPATIENT)
Dept: OUTPATIENT SERVICES | Facility: HOSPITAL | Age: 44
LOS: 1 days | End: 2024-09-19
Payer: COMMERCIAL

## 2024-09-19 ENCOUNTER — RESULT REVIEW (OUTPATIENT)
Age: 44
End: 2024-09-19

## 2024-09-19 ENCOUNTER — APPOINTMENT (OUTPATIENT)
Dept: PSYCHIATRY | Facility: CLINIC | Age: 44
End: 2024-09-19

## 2024-09-19 DIAGNOSIS — Z12.31 ENCOUNTER FOR SCREENING MAMMOGRAM FOR MALIGNANT NEOPLASM OF BREAST: ICD-10-CM

## 2024-09-19 DIAGNOSIS — R92.30 DENSE BREASTS, UNSPECIFIED: ICD-10-CM

## 2024-09-19 PROCEDURE — 77067 SCR MAMMO BI INCL CAD: CPT | Mod: 26

## 2024-09-19 PROCEDURE — 77063 BREAST TOMOSYNTHESIS BI: CPT | Mod: 26

## 2024-09-19 PROCEDURE — 77067 SCR MAMMO BI INCL CAD: CPT

## 2024-09-19 PROCEDURE — 76641 ULTRASOUND BREAST COMPLETE: CPT | Mod: 26,50

## 2024-09-19 PROCEDURE — 76641 ULTRASOUND BREAST COMPLETE: CPT

## 2024-09-19 PROCEDURE — 77063 BREAST TOMOSYNTHESIS BI: CPT

## 2024-09-23 DIAGNOSIS — N64.89 OTHER SPECIFIED DISORDERS OF BREAST: ICD-10-CM

## 2024-09-24 ENCOUNTER — RESULT REVIEW (OUTPATIENT)
Age: 44
End: 2024-09-24

## 2024-09-24 ENCOUNTER — APPOINTMENT (OUTPATIENT)
Dept: ULTRASOUND IMAGING | Facility: CLINIC | Age: 44
End: 2024-09-24
Payer: COMMERCIAL

## 2024-09-24 ENCOUNTER — APPOINTMENT (OUTPATIENT)
Dept: MAMMOGRAPHY | Facility: CLINIC | Age: 44
End: 2024-09-24
Payer: COMMERCIAL

## 2024-09-24 ENCOUNTER — OUTPATIENT (OUTPATIENT)
Dept: OUTPATIENT SERVICES | Facility: HOSPITAL | Age: 44
LOS: 1 days | End: 2024-09-24
Payer: COMMERCIAL

## 2024-09-24 DIAGNOSIS — Z00.8 ENCOUNTER FOR OTHER GENERAL EXAMINATION: ICD-10-CM

## 2024-09-24 PROCEDURE — G0279: CPT

## 2024-09-24 PROCEDURE — 77065 DX MAMMO INCL CAD UNI: CPT | Mod: 26,LT

## 2024-09-24 PROCEDURE — G0279: CPT | Mod: 26

## 2024-09-24 PROCEDURE — 77065 DX MAMMO INCL CAD UNI: CPT

## 2024-09-24 PROCEDURE — 76642 ULTRASOUND BREAST LIMITED: CPT

## 2024-09-24 PROCEDURE — 76642 ULTRASOUND BREAST LIMITED: CPT | Mod: 26,LT

## 2024-09-25 ENCOUNTER — APPOINTMENT (OUTPATIENT)
Dept: PSYCHIATRY | Facility: CLINIC | Age: 44
End: 2024-09-25
Payer: COMMERCIAL

## 2024-09-25 PROCEDURE — 90837 PSYTX W PT 60 MINUTES: CPT | Mod: 95

## 2024-09-25 NOTE — REASON FOR VISIT
[Patient preference] : as per patient preference [Telehealth (audio & video) - Individual/Group] : This visit was provided via telehealth using real-time 2-way audio visual technology. [Medical Office: ([facility])___] : The provider was located at the medical office in [unfilled]. [Home] : The patient, [unfilled], was located at home, [unfilled], at the time of the visit. [Participant(s) identity verified] : Participant(s) identity verified. [Verbal consent obtained from patient/other participant(s)] : Verbal consent for telehealth/telephonic services obtained from patient/other participant(s) [Patient] : Patient [Patient's space is appropriate for telehealth and maintains privacy/confidentiality.] : Patient's space is appropriate for telehealth and maintains privacy/confidentiality. [FreeTextEntry4] : 1:00 pm [FreeTextEntry5] : 2:00 pm [FreeTextEntry1] : [place] is presenting today for a follow up psychotherapy session.

## 2024-09-25 NOTE — END OF VISIT
[Duration of Psychotherapy Visit (minutes spent in synchronous communication): ____] : Duration of Psychotherapy Visit (minutes spent in synchronous communication): [unfilled] [Individual Psychotherapy for 53+ minutes] : Individual Psychotherapy for 53+ minutes  [Teletherapy Service Provided] : The services provided in this session were delivered via tele-therapy [Licensed Clinician] : Licensed Clinician [FreeTextEntry3] : [place], [place] [FreeTextEntry5] : [facility] [place] [address], [address], [place] [address]
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
## 2024-10-08 ENCOUNTER — APPOINTMENT (OUTPATIENT)
Dept: PSYCHIATRY | Facility: CLINIC | Age: 44
End: 2024-10-08
Payer: COMMERCIAL

## 2024-10-08 DIAGNOSIS — F32.A ANXIETY DISORDER, UNSPECIFIED: ICD-10-CM

## 2024-10-08 DIAGNOSIS — F51.04 PSYCHOPHYSIOLOGIC INSOMNIA: ICD-10-CM

## 2024-10-08 DIAGNOSIS — F41.9 ANXIETY DISORDER, UNSPECIFIED: ICD-10-CM

## 2024-10-08 DIAGNOSIS — F51.02 ADJUSTMENT INSOMNIA: ICD-10-CM

## 2024-10-08 DIAGNOSIS — F41.0 PANIC DISORDER [EPISODIC PAROXYSMAL ANXIETY]: ICD-10-CM

## 2024-10-08 PROCEDURE — 99214 OFFICE O/P EST MOD 30 MIN: CPT | Mod: 95

## 2024-10-10 ENCOUNTER — NON-APPOINTMENT (OUTPATIENT)
Age: 44
End: 2024-10-10

## 2024-10-10 ENCOUNTER — APPOINTMENT (OUTPATIENT)
Dept: PAIN MANAGEMENT | Facility: CLINIC | Age: 44
End: 2024-10-10
Payer: COMMERCIAL

## 2024-10-10 ENCOUNTER — APPOINTMENT (OUTPATIENT)
Dept: PSYCHIATRY | Facility: CLINIC | Age: 44
End: 2024-10-10
Payer: COMMERCIAL

## 2024-10-10 VITALS — WEIGHT: 170 LBS | HEIGHT: 63 IN | BODY MASS INDEX: 30.12 KG/M2

## 2024-10-10 DIAGNOSIS — M47.816 SPONDYLOSIS W/OUT MYELOPATHY OR RADICULOPATHY, LUMBAR REGION: ICD-10-CM

## 2024-10-10 DIAGNOSIS — M53.3 SACROCOCCYGEAL DISORDERS, NOT ELSEWHERE CLASSIFIED: ICD-10-CM

## 2024-10-10 DIAGNOSIS — M54.16 RADICULOPATHY, LUMBAR REGION: ICD-10-CM

## 2024-10-10 PROCEDURE — 90837 PSYTX W PT 60 MINUTES: CPT | Mod: 95

## 2024-10-10 PROCEDURE — 99204 OFFICE O/P NEW MOD 45 MIN: CPT

## 2024-10-18 ENCOUNTER — NON-APPOINTMENT (OUTPATIENT)
Age: 44
End: 2024-10-18

## 2024-10-23 ENCOUNTER — APPOINTMENT (OUTPATIENT)
Dept: ORTHOPEDIC SURGERY | Facility: CLINIC | Age: 44
End: 2024-10-23
Payer: COMMERCIAL

## 2024-10-23 ENCOUNTER — APPOINTMENT (OUTPATIENT)
Dept: PSYCHIATRY | Facility: CLINIC | Age: 44
End: 2024-10-23
Payer: COMMERCIAL

## 2024-10-23 VITALS — BODY MASS INDEX: 30.12 KG/M2 | WEIGHT: 170 LBS | HEIGHT: 63 IN

## 2024-10-23 DIAGNOSIS — M17.11 UNILATERAL PRIMARY OSTEOARTHRITIS, RIGHT KNEE: ICD-10-CM

## 2024-10-23 DIAGNOSIS — M25.561 PAIN IN RIGHT KNEE: ICD-10-CM

## 2024-10-23 PROCEDURE — 73562 X-RAY EXAM OF KNEE 3: CPT | Mod: RT

## 2024-10-23 PROCEDURE — 90837 PSYTX W PT 60 MINUTES: CPT | Mod: 95

## 2024-10-23 PROCEDURE — 99214 OFFICE O/P EST MOD 30 MIN: CPT

## 2024-10-23 RX ORDER — METHYLPREDNISOLONE 4 MG/1
4 TABLET ORAL
Qty: 1 | Refills: 0 | Status: ACTIVE | COMMUNITY
Start: 2024-10-23 | End: 1900-01-01

## 2024-10-24 ENCOUNTER — APPOINTMENT (OUTPATIENT)
Dept: PSYCHIATRY | Facility: CLINIC | Age: 44
End: 2024-10-24
Payer: COMMERCIAL

## 2024-10-24 PROCEDURE — 90837 PSYTX W PT 60 MINUTES: CPT | Mod: 95

## 2024-10-29 ENCOUNTER — APPOINTMENT (OUTPATIENT)
Dept: MRI IMAGING | Facility: CLINIC | Age: 44
End: 2024-10-29
Payer: COMMERCIAL

## 2024-10-29 PROCEDURE — 73721 MRI JNT OF LWR EXTRE W/O DYE: CPT | Mod: RT

## 2024-11-06 ENCOUNTER — APPOINTMENT (OUTPATIENT)
Dept: ORTHOPEDIC SURGERY | Facility: CLINIC | Age: 44
End: 2024-11-06
Payer: COMMERCIAL

## 2024-11-06 VITALS — BODY MASS INDEX: 30.12 KG/M2 | WEIGHT: 170 LBS | HEIGHT: 63 IN

## 2024-11-06 DIAGNOSIS — M17.11 UNILATERAL PRIMARY OSTEOARTHRITIS, RIGHT KNEE: ICD-10-CM

## 2024-11-06 PROCEDURE — 99214 OFFICE O/P EST MOD 30 MIN: CPT

## 2024-11-07 ENCOUNTER — APPOINTMENT (OUTPATIENT)
Dept: PSYCHIATRY | Facility: CLINIC | Age: 44
End: 2024-11-07
Payer: COMMERCIAL

## 2024-11-07 PROCEDURE — 90837 PSYTX W PT 60 MINUTES: CPT | Mod: 95

## 2024-11-08 ENCOUNTER — APPOINTMENT (OUTPATIENT)
Dept: PSYCHIATRY | Facility: CLINIC | Age: 44
End: 2024-11-08

## 2024-11-08 ENCOUNTER — APPOINTMENT (OUTPATIENT)
Dept: PAIN MANAGEMENT | Facility: CLINIC | Age: 44
End: 2024-11-08
Payer: COMMERCIAL

## 2024-11-08 DIAGNOSIS — M54.16 RADICULOPATHY, LUMBAR REGION: ICD-10-CM

## 2024-11-08 PROCEDURE — 64483 NJX AA&/STRD TFRM EPI L/S 1: CPT | Mod: LT

## 2024-11-13 ENCOUNTER — RX RENEWAL (OUTPATIENT)
Age: 44
End: 2024-11-13

## 2024-11-14 ENCOUNTER — APPOINTMENT (OUTPATIENT)
Dept: PSYCHIATRY | Facility: CLINIC | Age: 44
End: 2024-11-14

## 2024-11-21 ENCOUNTER — APPOINTMENT (OUTPATIENT)
Dept: PSYCHIATRY | Facility: CLINIC | Age: 44
End: 2024-11-21
Payer: COMMERCIAL

## 2024-11-21 DIAGNOSIS — F41.9 ANXIETY DISORDER, UNSPECIFIED: ICD-10-CM

## 2024-11-21 DIAGNOSIS — G47.00 INSOMNIA, UNSPECIFIED: ICD-10-CM

## 2024-11-21 DIAGNOSIS — F32.A ANXIETY DISORDER, UNSPECIFIED: ICD-10-CM

## 2024-11-21 PROCEDURE — 90837 PSYTX W PT 60 MINUTES: CPT | Mod: 95

## 2024-11-22 NOTE — PHYSICAL EXAM
Patient had her CT today at [facility].  I told her it is not yet available however she insisted Dr HULL told her to call us as soon as she leaves the test.  Please call to advise.   [Normal] : normal rate, regular rhythm, normal S1 and S2 and no murmur heard [No Rash] : no rash [Normal Gait] : normal gait [Normal Affect] : the affect was normal

## 2024-11-26 ENCOUNTER — APPOINTMENT (OUTPATIENT)
Dept: PAIN MANAGEMENT | Facility: CLINIC | Age: 44
End: 2024-11-26
Payer: COMMERCIAL

## 2024-11-26 VITALS — HEIGHT: 63 IN | BODY MASS INDEX: 29.95 KG/M2 | WEIGHT: 169 LBS

## 2024-11-26 DIAGNOSIS — M53.3 SACROCOCCYGEAL DISORDERS, NOT ELSEWHERE CLASSIFIED: ICD-10-CM

## 2024-11-26 DIAGNOSIS — M54.16 RADICULOPATHY, LUMBAR REGION: ICD-10-CM

## 2024-11-26 PROCEDURE — 99214 OFFICE O/P EST MOD 30 MIN: CPT

## 2024-12-04 ENCOUNTER — APPOINTMENT (OUTPATIENT)
Dept: ORTHOPEDIC SURGERY | Facility: CLINIC | Age: 44
End: 2024-12-04

## 2024-12-09 ENCOUNTER — RX RENEWAL (OUTPATIENT)
Age: 44
End: 2024-12-09

## 2024-12-09 ENCOUNTER — APPOINTMENT (OUTPATIENT)
Dept: FAMILY MEDICINE | Facility: CLINIC | Age: 44
End: 2024-12-09
Payer: COMMERCIAL

## 2024-12-09 VITALS
DIASTOLIC BLOOD PRESSURE: 80 MMHG | BODY MASS INDEX: 29.06 KG/M2 | OXYGEN SATURATION: 98 % | SYSTOLIC BLOOD PRESSURE: 124 MMHG | HEART RATE: 80 BPM | WEIGHT: 164 LBS | HEIGHT: 63 IN

## 2024-12-09 DIAGNOSIS — Z00.00 ENCOUNTER FOR GENERAL ADULT MEDICAL EXAMINATION W/OUT ABNORMAL FINDINGS: ICD-10-CM

## 2024-12-09 DIAGNOSIS — R23.2 FLUSHING: ICD-10-CM

## 2024-12-09 PROCEDURE — 99396 PREV VISIT EST AGE 40-64: CPT

## 2024-12-09 PROCEDURE — 93000 ELECTROCARDIOGRAM COMPLETE: CPT

## 2024-12-09 RX ORDER — FEZOLINETANT 45 MG/1
45 TABLET, FILM COATED ORAL
Qty: 30 | Refills: 3 | Status: ACTIVE | COMMUNITY
Start: 2024-12-09 | End: 1900-01-01

## 2024-12-10 LAB
ALBUMIN SERPL ELPH-MCNC: 3.9 G/DL
ALP BLD-CCNC: 104 U/L
ALT SERPL-CCNC: 11 U/L
ANION GAP SERPL CALC-SCNC: 13 MMOL/L
APPEARANCE: ABNORMAL
AST SERPL-CCNC: 14 U/L
BACTERIA: ABNORMAL /HPF
BASOPHILS # BLD AUTO: 0.02 K/UL
BASOPHILS NFR BLD AUTO: 0.3 %
BILIRUB SERPL-MCNC: 0.3 MG/DL
BILIRUBIN URINE: NEGATIVE
BLOOD URINE: NEGATIVE
BUN SERPL-MCNC: 10 MG/DL
CALCIUM SERPL-MCNC: 8.9 MG/DL
CAST: 1 /LPF
CHLORIDE SERPL-SCNC: 103 MMOL/L
CHOLEST SERPL-MCNC: 175 MG/DL
CO2 SERPL-SCNC: 24 MMOL/L
COLOR: YELLOW
CREAT SERPL-MCNC: 0.67 MG/DL
CRP SERPL-MCNC: <3 MG/L
EGFR: 110 ML/MIN/1.73M2
EOSINOPHIL # BLD AUTO: 0.14 K/UL
EOSINOPHIL NFR BLD AUTO: 2.3 %
EPITHELIAL CELLS: 11 /HPF
ERYTHROCYTE [SEDIMENTATION RATE] IN BLOOD BY WESTERGREN METHOD: 27 MM/HR
ESTIMATED AVERAGE GLUCOSE: 103 MG/DL
ESTRADIOL SERPL-MCNC: 246 PG/ML
FERRITIN SERPL-MCNC: 9 NG/ML
FOLATE SERPL-MCNC: 5.2 NG/ML
FSH SERPL-MCNC: 3.7 IU/L
GLUCOSE QUALITATIVE U: NEGATIVE MG/DL
GLUCOSE SERPL-MCNC: 80 MG/DL
HBA1C MFR BLD HPLC: 5.2 %
HCT VFR BLD CALC: 39.8 %
HDLC SERPL-MCNC: 76 MG/DL
HGB BLD-MCNC: 12.3 G/DL
IMM GRANULOCYTES NFR BLD AUTO: 0.2 %
IRON SATN MFR SERPL: 6 %
IRON SERPL-MCNC: 23 UG/DL
KETONES URINE: ABNORMAL MG/DL
LDLC SERPL CALC-MCNC: 81 MG/DL
LEUKOCYTE ESTERASE URINE: ABNORMAL
LH SERPL-ACNC: 9.6 IU/L
LYMPHOCYTES # BLD AUTO: 1.45 K/UL
LYMPHOCYTES NFR BLD AUTO: 24 %
MAN DIFF?: NORMAL
MCHC RBC-ENTMCNC: 27.8 PG
MCHC RBC-ENTMCNC: 30.9 G/DL
MCV RBC AUTO: 89.8 FL
MICROSCOPIC-UA: NORMAL
MONOCYTES # BLD AUTO: 0.66 K/UL
MONOCYTES NFR BLD AUTO: 10.9 %
NEUTROPHILS # BLD AUTO: 3.77 K/UL
NEUTROPHILS NFR BLD AUTO: 62.3 %
NITRITE URINE: NEGATIVE
NONHDLC SERPL-MCNC: 99 MG/DL
PH URINE: 6.5
PLATELET # BLD AUTO: 333 K/UL
POTASSIUM SERPL-SCNC: 4.5 MMOL/L
PROT SERPL-MCNC: 6.3 G/DL
PROTEIN URINE: NORMAL MG/DL
RBC # BLD: 4.43 M/UL
RBC # FLD: 13.2 %
RED BLOOD CELLS URINE: 0 /HPF
SODIUM SERPL-SCNC: 141 MMOL/L
SPECIFIC GRAVITY URINE: 1.02
TIBC SERPL-MCNC: 408 UG/DL
TRIGL SERPL-MCNC: 99 MG/DL
TSH SERPL-ACNC: 1.66 UIU/ML
UIBC SERPL-MCNC: 385 UG/DL
URATE SERPL-MCNC: 3.3 MG/DL
UROBILINOGEN URINE: 1 MG/DL
VIT B12 SERPL-MCNC: 228 PG/ML
WBC # FLD AUTO: 6.05 K/UL
WHITE BLOOD CELLS URINE: 6 /HPF

## 2024-12-11 ENCOUNTER — APPOINTMENT (OUTPATIENT)
Dept: OBGYN | Facility: CLINIC | Age: 44
End: 2024-12-11

## 2024-12-11 VITALS
HEIGHT: 63 IN | BODY MASS INDEX: 29.23 KG/M2 | WEIGHT: 165 LBS | SYSTOLIC BLOOD PRESSURE: 148 MMHG | DIASTOLIC BLOOD PRESSURE: 106 MMHG

## 2024-12-11 PROCEDURE — 99396 PREV VISIT EST AGE 40-64: CPT

## 2024-12-11 PROCEDURE — 99459 PELVIC EXAMINATION: CPT

## 2024-12-11 PROCEDURE — 99213 OFFICE O/P EST LOW 20 MIN: CPT | Mod: 25

## 2024-12-12 ENCOUNTER — APPOINTMENT (OUTPATIENT)
Dept: PSYCHIATRY | Facility: CLINIC | Age: 44
End: 2024-12-12
Payer: COMMERCIAL

## 2024-12-12 ENCOUNTER — APPOINTMENT (OUTPATIENT)
Age: 44
End: 2024-12-12

## 2024-12-12 LAB — ANA SER IF-ACNC: NEGATIVE

## 2024-12-12 PROCEDURE — 90837 PSYTX W PT 60 MINUTES: CPT | Mod: 95

## 2024-12-13 ENCOUNTER — APPOINTMENT (OUTPATIENT)
Dept: FAMILY MEDICINE | Facility: CLINIC | Age: 44
End: 2024-12-13
Payer: COMMERCIAL

## 2024-12-13 PROCEDURE — 96372 THER/PROPH/DIAG INJ SC/IM: CPT

## 2024-12-13 RX ADMIN — CYANOCOBALAMIN 0 MCG/ML: 1000 INJECTION, SOLUTION INTRAMUSCULAR; SUBCUTANEOUS at 00:00

## 2024-12-16 RX ORDER — CYANOCOBALAMIN 1000 UG/ML
1000 INJECTION, SOLUTION INTRAMUSCULAR; SUBCUTANEOUS
Qty: 1 | Refills: 0 | Status: ACTIVE | COMMUNITY
Start: 2024-12-13 | End: 1900-01-01

## 2024-12-16 RX ORDER — SYRINGE W-NEEDLE,DISPOSAB,3 ML 25GX1"
25G X 1" SYRINGE, EMPTY DISPOSABLE MISCELLANEOUS
Qty: 1 | Refills: 0 | Status: ACTIVE | COMMUNITY
Start: 2024-12-13 | End: 1900-01-01

## 2024-12-16 RX ORDER — NEEDLES, SAFETY 18GX1 1/2"
25G X 1" NEEDLE, DISPOSABLE MISCELLANEOUS
Qty: 1 | Refills: 0 | Status: ACTIVE | COMMUNITY
Start: 2024-12-13 | End: 1900-01-01

## 2024-12-17 ENCOUNTER — TRANSCRIPTION ENCOUNTER (OUTPATIENT)
Age: 44
End: 2024-12-17

## 2024-12-17 DIAGNOSIS — B00.1 HERPESVIRAL VESICULAR DERMATITIS: ICD-10-CM

## 2024-12-17 RX ORDER — VALACYCLOVIR 500 MG/1
500 TABLET, FILM COATED ORAL
Qty: 30 | Refills: 2 | Status: ACTIVE | COMMUNITY
Start: 2024-12-17 | End: 1900-01-01

## 2024-12-19 ENCOUNTER — APPOINTMENT (OUTPATIENT)
Dept: PSYCHIATRY | Facility: CLINIC | Age: 44
End: 2024-12-19
Payer: COMMERCIAL

## 2024-12-19 DIAGNOSIS — F41.9 ANXIETY DISORDER, UNSPECIFIED: ICD-10-CM

## 2024-12-19 DIAGNOSIS — F32.A ANXIETY DISORDER, UNSPECIFIED: ICD-10-CM

## 2024-12-19 LAB
CYTOLOGY CVX/VAG DOC THIN PREP: ABNORMAL
HPV HIGH+LOW RISK DNA PNL CVX: NOT DETECTED

## 2024-12-19 PROCEDURE — 90837 PSYTX W PT 60 MINUTES: CPT | Mod: 95

## 2024-12-20 ENCOUNTER — APPOINTMENT (OUTPATIENT)
Dept: PAIN MANAGEMENT | Facility: CLINIC | Age: 44
End: 2024-12-20
Payer: COMMERCIAL

## 2024-12-20 DIAGNOSIS — M54.16 RADICULOPATHY, LUMBAR REGION: ICD-10-CM

## 2024-12-20 PROCEDURE — 62323 NJX INTERLAMINAR LMBR/SAC: CPT

## 2025-01-02 ENCOUNTER — APPOINTMENT (OUTPATIENT)
Dept: PSYCHIATRY | Facility: CLINIC | Age: 45
End: 2025-01-02

## 2025-01-06 PROBLEM — N95.1 MENOPAUSAL SYMPTOM: Status: ACTIVE | Noted: 2025-01-06

## 2025-01-07 ENCOUNTER — APPOINTMENT (OUTPATIENT)
Dept: PAIN MANAGEMENT | Facility: CLINIC | Age: 45
End: 2025-01-07
Payer: COMMERCIAL

## 2025-01-07 VITALS — HEIGHT: 63 IN | BODY MASS INDEX: 29.59 KG/M2 | WEIGHT: 167 LBS

## 2025-01-07 DIAGNOSIS — M53.3 SACROCOCCYGEAL DISORDERS, NOT ELSEWHERE CLASSIFIED: ICD-10-CM

## 2025-01-07 DIAGNOSIS — M54.16 RADICULOPATHY, LUMBAR REGION: ICD-10-CM

## 2025-01-07 DIAGNOSIS — M47.816 SPONDYLOSIS W/OUT MYELOPATHY OR RADICULOPATHY, LUMBAR REGION: ICD-10-CM

## 2025-01-07 PROCEDURE — 99213 OFFICE O/P EST LOW 20 MIN: CPT

## 2025-01-13 ENCOUNTER — RX RENEWAL (OUTPATIENT)
Age: 45
End: 2025-01-13

## 2025-01-13 ENCOUNTER — TRANSCRIPTION ENCOUNTER (OUTPATIENT)
Age: 45
End: 2025-01-13

## 2025-01-14 ENCOUNTER — RX RENEWAL (OUTPATIENT)
Age: 45
End: 2025-01-14

## 2025-01-15 ENCOUNTER — TRANSCRIPTION ENCOUNTER (OUTPATIENT)
Age: 45
End: 2025-01-15

## 2025-01-15 RX ORDER — VENLAFAXINE HYDROCHLORIDE 75 MG/1
75 CAPSULE, EXTENDED RELEASE ORAL 3 TIMES DAILY
Qty: 90 | Refills: 0 | Status: ACTIVE | COMMUNITY
Start: 2025-01-15 | End: 1900-01-01

## 2025-01-17 ENCOUNTER — ASOB RESULT (OUTPATIENT)
Age: 45
End: 2025-01-17

## 2025-01-17 ENCOUNTER — APPOINTMENT (OUTPATIENT)
Dept: ANTEPARTUM | Facility: CLINIC | Age: 45
End: 2025-01-17
Payer: COMMERCIAL

## 2025-01-17 ENCOUNTER — APPOINTMENT (OUTPATIENT)
Dept: OBGYN | Facility: CLINIC | Age: 45
End: 2025-01-17
Payer: COMMERCIAL

## 2025-01-17 VITALS — HEIGHT: 63 IN | BODY MASS INDEX: 29.59 KG/M2 | WEIGHT: 167 LBS

## 2025-01-17 DIAGNOSIS — N92.1 EXCESSIVE AND FREQUENT MENSTRUATION WITH IRREGULAR CYCLE: ICD-10-CM

## 2025-01-17 DIAGNOSIS — N95.1 MENOPAUSAL AND FEMALE CLIMACTERIC STATES: ICD-10-CM

## 2025-01-17 LAB
ESTRADIOL SERPL-MCNC: 59 PG/ML
FSH SERPL-MCNC: 13.2 IU/L
HCG SERPL-MCNC: <1 MIU/ML
LH SERPL-ACNC: 12.3 IU/L
PROGEST SERPL-MCNC: 0.2 NG/ML
T4 FREE SERPL-MCNC: 1 NG/DL
TSH SERPL-ACNC: 1.71 UIU/ML

## 2025-01-17 PROCEDURE — 76830 TRANSVAGINAL US NON-OB: CPT

## 2025-01-17 PROCEDURE — 76856 US EXAM PELVIC COMPLETE: CPT | Mod: 59

## 2025-01-17 PROCEDURE — 99214 OFFICE O/P EST MOD 30 MIN: CPT

## 2025-01-17 RX ORDER — MISOPROSTOL 200 UG/1
200 TABLET ORAL
Qty: 1 | Refills: 0 | Status: ACTIVE | COMMUNITY
Start: 2025-01-17 | End: 1900-01-01

## 2025-01-23 ENCOUNTER — APPOINTMENT (OUTPATIENT)
Dept: PSYCHIATRY | Facility: CLINIC | Age: 45
End: 2025-01-23
Payer: COMMERCIAL

## 2025-01-23 DIAGNOSIS — F41.9 ANXIETY DISORDER, UNSPECIFIED: ICD-10-CM

## 2025-01-23 DIAGNOSIS — F32.A ANXIETY DISORDER, UNSPECIFIED: ICD-10-CM

## 2025-01-23 PROCEDURE — 90837 PSYTX W PT 60 MINUTES: CPT | Mod: 95

## 2025-01-27 ENCOUNTER — APPOINTMENT (OUTPATIENT)
Dept: OBGYN | Facility: CLINIC | Age: 45
End: 2025-01-27
Payer: COMMERCIAL

## 2025-01-27 VITALS
WEIGHT: 165 LBS | SYSTOLIC BLOOD PRESSURE: 140 MMHG | DIASTOLIC BLOOD PRESSURE: 93 MMHG | BODY MASS INDEX: 30.36 KG/M2 | HEIGHT: 62 IN

## 2025-01-27 DIAGNOSIS — N92.1 EXCESSIVE AND FREQUENT MENSTRUATION WITH IRREGULAR CYCLE: ICD-10-CM

## 2025-01-27 DIAGNOSIS — N95.1 MENOPAUSAL AND FEMALE CLIMACTERIC STATES: ICD-10-CM

## 2025-01-27 LAB — HCG UR QL: NEGATIVE

## 2025-01-27 PROCEDURE — 99214 OFFICE O/P EST MOD 30 MIN: CPT | Mod: 25

## 2025-01-27 PROCEDURE — 81025 URINE PREGNANCY TEST: CPT

## 2025-01-27 PROCEDURE — 99459 PELVIC EXAMINATION: CPT

## 2025-01-27 PROCEDURE — 58100 BIOPSY OF UTERUS LINING: CPT

## 2025-01-27 PROCEDURE — 58301 REMOVE INTRAUTERINE DEVICE: CPT

## 2025-01-30 ENCOUNTER — APPOINTMENT (OUTPATIENT)
Dept: PSYCHIATRY | Facility: CLINIC | Age: 45
End: 2025-01-30
Payer: COMMERCIAL

## 2025-01-30 LAB — CORE LAB BIOPSY: NORMAL

## 2025-01-30 PROCEDURE — 90837 PSYTX W PT 60 MINUTES: CPT | Mod: 95

## 2025-02-05 ENCOUNTER — RX RENEWAL (OUTPATIENT)
Age: 45
End: 2025-02-05

## 2025-02-06 ENCOUNTER — APPOINTMENT (OUTPATIENT)
Dept: PSYCHIATRY | Facility: CLINIC | Age: 45
End: 2025-02-06
Payer: COMMERCIAL

## 2025-02-06 PROCEDURE — 90837 PSYTX W PT 60 MINUTES: CPT | Mod: 95

## 2025-02-13 ENCOUNTER — APPOINTMENT (OUTPATIENT)
Dept: PSYCHIATRY | Facility: CLINIC | Age: 45
End: 2025-02-13
Payer: COMMERCIAL

## 2025-02-13 DIAGNOSIS — F32.A ANXIETY DISORDER, UNSPECIFIED: ICD-10-CM

## 2025-02-13 DIAGNOSIS — F41.9 ANXIETY DISORDER, UNSPECIFIED: ICD-10-CM

## 2025-02-13 PROCEDURE — 90837 PSYTX W PT 60 MINUTES: CPT | Mod: 95

## 2025-02-17 ENCOUNTER — TRANSCRIPTION ENCOUNTER (OUTPATIENT)
Age: 45
End: 2025-02-17

## 2025-02-20 ENCOUNTER — APPOINTMENT (OUTPATIENT)
Dept: PSYCHIATRY | Facility: CLINIC | Age: 45
End: 2025-02-20

## 2025-03-06 ENCOUNTER — APPOINTMENT (OUTPATIENT)
Dept: PSYCHIATRY | Facility: CLINIC | Age: 45
End: 2025-03-06
Payer: COMMERCIAL

## 2025-03-06 PROCEDURE — 90837 PSYTX W PT 60 MINUTES: CPT | Mod: 95

## 2025-03-13 ENCOUNTER — APPOINTMENT (OUTPATIENT)
Dept: PSYCHIATRY | Facility: CLINIC | Age: 45
End: 2025-03-13
Payer: COMMERCIAL

## 2025-03-13 PROCEDURE — 90837 PSYTX W PT 60 MINUTES: CPT | Mod: 95

## 2025-03-17 ENCOUNTER — APPOINTMENT (OUTPATIENT)
Dept: OBGYN | Facility: AMBULATORY SURGERY CENTER | Age: 45
End: 2025-03-17

## 2025-03-20 ENCOUNTER — NON-APPOINTMENT (OUTPATIENT)
Age: 45
End: 2025-03-20

## 2025-03-25 ENCOUNTER — RX RENEWAL (OUTPATIENT)
Age: 45
End: 2025-03-25

## 2025-03-26 ENCOUNTER — NON-APPOINTMENT (OUTPATIENT)
Age: 45
End: 2025-03-26

## 2025-03-27 ENCOUNTER — APPOINTMENT (OUTPATIENT)
Dept: PSYCHIATRY | Facility: CLINIC | Age: 45
End: 2025-03-27
Payer: COMMERCIAL

## 2025-03-27 PROCEDURE — 90837 PSYTX W PT 60 MINUTES: CPT | Mod: 95

## 2025-04-01 ENCOUNTER — APPOINTMENT (OUTPATIENT)
Dept: OBGYN | Facility: CLINIC | Age: 45
End: 2025-04-01

## 2025-04-01 ENCOUNTER — RX RENEWAL (OUTPATIENT)
Age: 45
End: 2025-04-01

## 2025-04-03 ENCOUNTER — APPOINTMENT (OUTPATIENT)
Dept: PSYCHIATRY | Facility: CLINIC | Age: 45
End: 2025-04-03

## 2025-04-08 ENCOUNTER — APPOINTMENT (OUTPATIENT)
Dept: PAIN MANAGEMENT | Facility: CLINIC | Age: 45
End: 2025-04-08

## 2025-04-10 ENCOUNTER — APPOINTMENT (OUTPATIENT)
Dept: PSYCHIATRY | Facility: CLINIC | Age: 45
End: 2025-04-10
Payer: COMMERCIAL

## 2025-04-10 DIAGNOSIS — F41.9 ANXIETY DISORDER, UNSPECIFIED: ICD-10-CM

## 2025-04-10 DIAGNOSIS — F32.A ANXIETY DISORDER, UNSPECIFIED: ICD-10-CM

## 2025-04-10 PROCEDURE — 90837 PSYTX W PT 60 MINUTES: CPT | Mod: 95

## 2025-04-15 ENCOUNTER — RX RENEWAL (OUTPATIENT)
Age: 45
End: 2025-04-15

## 2025-04-17 ENCOUNTER — APPOINTMENT (OUTPATIENT)
Dept: PSYCHIATRY | Facility: CLINIC | Age: 45
End: 2025-04-17
Payer: COMMERCIAL

## 2025-04-17 PROCEDURE — 90837 PSYTX W PT 60 MINUTES: CPT | Mod: 95

## 2025-04-24 ENCOUNTER — APPOINTMENT (OUTPATIENT)
Dept: FAMILY MEDICINE | Facility: CLINIC | Age: 45
End: 2025-04-24
Payer: COMMERCIAL

## 2025-04-24 ENCOUNTER — APPOINTMENT (OUTPATIENT)
Dept: PSYCHIATRY | Facility: CLINIC | Age: 45
End: 2025-04-24
Payer: COMMERCIAL

## 2025-04-24 VITALS
HEART RATE: 72 BPM | DIASTOLIC BLOOD PRESSURE: 70 MMHG | HEIGHT: 62 IN | SYSTOLIC BLOOD PRESSURE: 110 MMHG | WEIGHT: 157 LBS | BODY MASS INDEX: 28.89 KG/M2 | OXYGEN SATURATION: 98 %

## 2025-04-24 DIAGNOSIS — I10 ESSENTIAL (PRIMARY) HYPERTENSION: ICD-10-CM

## 2025-04-24 DIAGNOSIS — M47.814 SPONDYLOSIS W/OUT MYELOPATHY OR RADICULOPATHY, THORACIC REGION: ICD-10-CM

## 2025-04-24 DIAGNOSIS — F51.02 ADJUSTMENT INSOMNIA: ICD-10-CM

## 2025-04-24 DIAGNOSIS — Z86.69 PERSONAL HISTORY OF OTHER DISEASES OF THE NERVOUS SYSTEM AND SENSE ORGANS: ICD-10-CM

## 2025-04-24 DIAGNOSIS — R73.03 PREDIABETES.: ICD-10-CM

## 2025-04-24 DIAGNOSIS — Z86.39 PERSONAL HISTORY OF OTHER ENDOCRINE, NUTRITIONAL AND METABOLIC DISEASE: ICD-10-CM

## 2025-04-24 DIAGNOSIS — Z86.2 PERSONAL HISTORY OF DISEASES OF THE BLOOD AND BLOOD-FORMING ORGANS AND CERTAIN DISORDERS INVOLVING THE IMMUNE MECHANISM: ICD-10-CM

## 2025-04-24 DIAGNOSIS — S83.8X2D SPRAIN OF OTHER SPECIFIED PARTS OF LEFT KNEE, SUBSEQUENT ENCOUNTER: ICD-10-CM

## 2025-04-24 DIAGNOSIS — Z87.440 PERSONAL HISTORY OF URINARY (TRACT) INFECTIONS: ICD-10-CM

## 2025-04-24 DIAGNOSIS — E78.5 HYPERLIPIDEMIA, UNSPECIFIED: ICD-10-CM

## 2025-04-24 DIAGNOSIS — R55 SYNCOPE AND COLLAPSE: ICD-10-CM

## 2025-04-24 DIAGNOSIS — E11.9 TYPE 2 DIABETES MELLITUS W/OUT COMPLICATIONS: ICD-10-CM

## 2025-04-24 DIAGNOSIS — Z87.898 PERSONAL HISTORY OF OTHER SPECIFIED CONDITIONS: ICD-10-CM

## 2025-04-24 PROCEDURE — 99214 OFFICE O/P EST MOD 30 MIN: CPT

## 2025-04-24 PROCEDURE — G2211 COMPLEX E/M VISIT ADD ON: CPT

## 2025-04-24 PROCEDURE — 90837 PSYTX W PT 60 MINUTES: CPT | Mod: 95

## 2025-04-28 LAB
ALBUMIN SERPL ELPH-MCNC: 3.9 G/DL
ALP BLD-CCNC: 89 U/L
ALT SERPL-CCNC: 22 U/L
ANION GAP SERPL CALC-SCNC: 13 MMOL/L
AST SERPL-CCNC: 23 U/L
BASOPHILS # BLD AUTO: 0.04 K/UL
BASOPHILS NFR BLD AUTO: 0.8 %
BILIRUB SERPL-MCNC: 0.4 MG/DL
BUN SERPL-MCNC: 8 MG/DL
CALCIUM SERPL-MCNC: 8.6 MG/DL
CHLORIDE SERPL-SCNC: 101 MMOL/L
CHOLEST SERPL-MCNC: 165 MG/DL
CO2 SERPL-SCNC: 25 MMOL/L
CREAT SERPL-MCNC: 0.69 MG/DL
EGFRCR SERPLBLD CKD-EPI 2021: 109 ML/MIN/1.73M2
EOSINOPHIL # BLD AUTO: 0.2 K/UL
EOSINOPHIL NFR BLD AUTO: 3.8 %
ESTIMATED AVERAGE GLUCOSE: 111 MG/DL
FERRITIN SERPL-MCNC: 9 NG/ML
FOLATE SERPL-MCNC: 5.9 NG/ML
GLUCOSE SERPL-MCNC: 80 MG/DL
HBA1C MFR BLD HPLC: 5.5 %
HCT VFR BLD CALC: 39.1 %
HDLC SERPL-MCNC: 78 MG/DL
HGB BLD-MCNC: 12.4 G/DL
IMM GRANULOCYTES NFR BLD AUTO: 0 %
IRON SATN MFR SERPL: 11 %
IRON SERPL-MCNC: 48 UG/DL
LDLC SERPL-MCNC: 73 MG/DL
LYMPHOCYTES # BLD AUTO: 1.54 K/UL
LYMPHOCYTES NFR BLD AUTO: 29.6 %
MAN DIFF?: NORMAL
MCHC RBC-ENTMCNC: 27.6 PG
MCHC RBC-ENTMCNC: 31.7 G/DL
MCV RBC AUTO: 87.1 FL
MONOCYTES # BLD AUTO: 0.5 K/UL
MONOCYTES NFR BLD AUTO: 9.6 %
NEUTROPHILS # BLD AUTO: 2.92 K/UL
NEUTROPHILS NFR BLD AUTO: 56.2 %
NONHDLC SERPL-MCNC: 87 MG/DL
PLATELET # BLD AUTO: 352 K/UL
POTASSIUM SERPL-SCNC: 4.5 MMOL/L
PROT SERPL-MCNC: 6.3 G/DL
RBC # BLD: 4.49 M/UL
RBC # FLD: 14.4 %
SODIUM SERPL-SCNC: 139 MMOL/L
TIBC SERPL-MCNC: 429 UG/DL
TRIGL SERPL-MCNC: 76 MG/DL
TSH SERPL-ACNC: 2.01 UIU/ML
UIBC SERPL-MCNC: 381 UG/DL
VIT B12 SERPL-MCNC: 501 PG/ML
WBC # FLD AUTO: 5.2 K/UL